# Patient Record
Sex: FEMALE | Race: BLACK OR AFRICAN AMERICAN | Employment: FULL TIME | URBAN - METROPOLITAN AREA
[De-identification: names, ages, dates, MRNs, and addresses within clinical notes are randomized per-mention and may not be internally consistent; named-entity substitution may affect disease eponyms.]

---

## 2017-08-30 ENCOUNTER — OFFICE VISIT (OUTPATIENT)
Dept: INTERNAL MEDICINE CLINIC | Facility: CLINIC | Age: 22
End: 2017-08-30

## 2017-08-30 VITALS
RESPIRATION RATE: 18 BRPM | TEMPERATURE: 98.1 F | HEART RATE: 79 BPM | WEIGHT: 229 LBS | HEIGHT: 68 IN | BODY MASS INDEX: 34.71 KG/M2 | DIASTOLIC BLOOD PRESSURE: 82 MMHG | SYSTOLIC BLOOD PRESSURE: 122 MMHG

## 2017-08-30 DIAGNOSIS — E01.0 THYROMEGALY: ICD-10-CM

## 2017-08-30 DIAGNOSIS — Q60.3 HYPOPLASIA OF ONE KIDNEY: ICD-10-CM

## 2017-08-30 DIAGNOSIS — R53.82 CHRONIC FATIGUE: ICD-10-CM

## 2017-08-30 DIAGNOSIS — Z97.5 IUD (INTRAUTERINE DEVICE) IN PLACE: ICD-10-CM

## 2017-08-30 DIAGNOSIS — G89.29 CHRONIC RIGHT EAR PAIN: ICD-10-CM

## 2017-08-30 DIAGNOSIS — R63.5 WEIGHT GAIN: ICD-10-CM

## 2017-08-30 DIAGNOSIS — R03.0 ELEVATED BLOOD PRESSURE READING: ICD-10-CM

## 2017-08-30 DIAGNOSIS — R06.83 SNORING: ICD-10-CM

## 2017-08-30 DIAGNOSIS — H92.01 CHRONIC RIGHT EAR PAIN: ICD-10-CM

## 2017-08-30 DIAGNOSIS — Z00.00 PHYSICAL EXAM: Primary | ICD-10-CM

## 2017-08-30 DIAGNOSIS — E88.81 INSULIN RESISTANCE: ICD-10-CM

## 2017-08-30 DIAGNOSIS — L73.2 HIDRADENITIS: ICD-10-CM

## 2017-08-30 DIAGNOSIS — E66.9 OBESITY, UNSPECIFIED OBESITY SEVERITY, UNSPECIFIED OBESITY TYPE: ICD-10-CM

## 2017-08-30 LAB
BILIRUB UR QL STRIP: NEGATIVE
GLUCOSE UR-MCNC: NEGATIVE MG/DL
HBA1C MFR BLD HPLC: 5.3 %
KETONES P FAST UR STRIP-MCNC: NEGATIVE MG/DL
PH UR STRIP: 6.5 [PH] (ref 4.6–8)
PROT UR QL STRIP: NEGATIVE MG/DL
SP GR UR STRIP: 1.02 (ref 1–1.03)
UA UROBILINOGEN AMB POC: NORMAL (ref 0.2–1)
URINALYSIS CLARITY POC: CLEAR
URINALYSIS COLOR POC: YELLOW
URINE BLOOD POC: NEGATIVE
URINE LEUKOCYTES POC: NORMAL
URINE NITRITES POC: NEGATIVE

## 2017-08-30 RX ORDER — LISINOPRIL AND HYDROCHLOROTHIAZIDE 10; 12.5 MG/1; MG/1
TABLET ORAL DAILY
COMMUNITY
End: 2018-10-09 | Stop reason: SDUPTHER

## 2017-08-30 NOTE — PROGRESS NOTES
Chief Complaint   Patient presents with    Physical     1. Have you been to the ER, urgent care clinic since your last visit? Hospitalized since your last visit? No    2. Have you seen or consulted any other health care providers outside of the [address] [place] [address] since your last visit? Include any pap smears or colon screening.  No

## 2017-08-30 NOTE — LETTER
NOTIFICATION RETURN TO WORK / SCHOOL 
 
8/30/2017 1:40 PM 
 
Ms. Nadege Jamil [facility] [address] To Whom It May Concern: 
 
Nadege Jamil is currently under the care of Benny Cruz. She will return to work: 8/30/2017 If there are questions or concerns please have the patient contact our office. Sincerely, Joe Roper MD

## 2017-08-30 NOTE — MR AVS SNAPSHOT
Visit Information Date & Time Provider Department Dept. Phone Encounter #  
 8/30/2017 12:15 PM Yvonne Munoz, [facility] Internal Medicine [phone] [id number] Follow-up Instructions Return in about 2 weeks (around 9/13/2017) for follow up review results. Upcoming Health Maintenance Date Due Hepatitis A Peds Age 1-18 (1 of 2 - Standard Series) 1/26/1998 DTaP/Tdap/Td series (1 - Tdap) 1/26/2004 HPV AGE 9Y-26Y (1 of 3 - Female 3 Dose Series) 1/26/2008 INFLUENZA AGE 9 TO ADULT 8/1/2017 Allergies as of 8/30/2017  Review Complete On: 8/30/2017 By: Raine Alvarez LPN No Known Allergies Current Immunizations  Never Reviewed No immunizations on file. Not reviewed this visit You Were Diagnosed With   
  
 Codes Comments Physical exam    -  Primary ICD-10-CM: Z00.00 ICD-9-CM: V70.9 Hypoplasia of one kidney     ICD-10-CM: Q60.3 ICD-9-CM: 753.0 Insulin resistance     ICD-10-CM: V50.98 ICD-9-CM: 277.7 Elevated blood pressure reading     ICD-10-CM: R03.0 ICD-9-CM: 796.2 Hidradenitis     ICD-10-CM: L73.2 ICD-9-CM: 705.83   
 IUD (intrauterine device) in place     ICD-10-CM: Z97.5 ICD-9-CM: V45.51 Obesity, unspecified obesity severity, unspecified obesity type     ICD-10-CM: E66.9 ICD-9-CM: 278.00 Weight gain     ICD-10-CM: R63.5 ICD-9-CM: 783.1 Thyromegaly     ICD-10-CM: E01.0 ICD-9-CM: 240. 9 Chronic right ear pain     ICD-10-CM: H92.01, G89.29 ICD-9-CM: 388.70, 338.29 Snoring     ICD-10-CM: R06.83 
ICD-9-CM: 786.09 Chronic fatigue     ICD-10-CM: R53.82 
ICD-9-CM: 780.79 Vitals BP Pulse Temp Resp Height(growth percentile) Weight(growth percentile) 122/82 (BP 1 Location: Right arm, BP Patient Position: Sitting) 79 98.1 °F (36.7 °C) (Oral) 18 5' 7.5\" (1.715 m) 229 lb (103.9 kg) BMI OB Status Smoking Status 35.34 kg/m2 IUD Never Smoker BMI and BSA Data Body Mass Index Body Surface Area  
 35.34 kg/m 2 2.22 m 2 Preferred Pharmacy Pharmacy Name Phone [facility], [address] [place] [address] Your Updated Medication List  
  
   
This list is accurate as of: 8/30/17  1:32 PM.  Always use your most recent med list.  
  
  
  
  
 lisinopril-hydroCHLOROthiazide 10-12.5 mg per tablet Commonly known as:  Juhibello Tejada Take  by mouth daily. MIRENA 20 mcg/24 hr (5 years) IUD Generic drug:  levonorgestrel 1 Each by IntraUTERine route once. We Performed the Following AMB POC HEMOGLOBIN A1C [59685 CPT(R)] AMB POC URINALYSIS DIP STICK AUTO W/O MICRO [44391 CPT(R)] CBC WITH AUTOMATED DIFF [99605 CPT(R)] IRON [id number] CPT(R)] LIPID PANEL [86146 CPT(R)] METABOLIC PANEL, COMPREHENSIVE [28198 CPT(R)] REFERRAL TO ENT-OTOLARYNGOLOGY [OFE58 Custom] Comments:  
 Leartis Nyhan. AYUSH Mobley 
[address] [facility] [address] [place] [facility], [address] [place] [address] Phone: [phone] REFERRAL TO GYNECOLOGY [REF30 Custom] REFERRAL TO NEPHROLOGY [ENW25 Custom] REFERRAL TO OPHTHALMOLOGY [REF57 Custom] REFERRAL TO SLEEP STUDIES [REF99 Custom] T4, FREE [id number] CPT(R)] TSH 3RD GENERATION [86939 CPT(R)] VITAMIN B12 [id number] CPT(R)] VITAMIN D, 25 HYDROXY [id number] CPT(R)] Follow-up Instructions Return in about 2 weeks (around 9/13/2017) for follow up review results. To-Do List   
 09/30/2017 Imaging:  US THYROID/PARATHYROID/SOFT TISS Referral Information Referral ID Referred By Referred To  
  
 [id number] Yenifer Mederos MD   
   [address] [facility] [address] [facility], [address] Phone: [phone] Fax: [phone] Visits Status Start Date End Date 1 New Request 8/30/17 8/30/18  If your referral has a status of pending review or denied, additional information will be sent to support the outcome of this decision. Referral ID Referred By Referred To  
 [id number] Lorne Keenan MD  
   [address] [facility] [address] [facility], [address] Phone: [phone] Fax: [phone] Visits Status Start Date End Date 1 New Request 8/30/17 8/30/18 If your referral has a status of pending review or denied, additional information will be sent to support the outcome of this decision. Referral ID Referred By Referred To  
 [id number] Veryl Freiberg [facility] [address] [facility], [address] Visits Status Start Date End Date 1 New Request 8/30/17 8/30/18 If your referral has a status of pending review or denied, additional information will be sent to support the outcome of this decision. Referral ID Referred By Referred To  
 [id number] [facility]  
   [address] [facility] [address] Coleen oGnzalezanellcarlita Tobar, [address] [place] [address] Visits Status Start Date End Date 1 New Request 8/30/17 8/30/18 If your referral has a status of pending review or denied, additional information will be sent to support the outcome of this decision. Referral ID Referred By Referred To  
 [id number] Owen Jiménez MD  
   [facility] [place] [address] Phone: [phone] Fax: [phone] Visits Status Start Date End Date 1 New Request 8/30/17 8/30/18 If your referral has a status of pending review or denied, additional information will be sent to support the outcome of this decision. Introducing [facility]! Ariella Simpson introduces Beegit patient portal. Now you can access parts of your medical record, email your doctor's office, and request medication refills online. 1. In your internet browser, go to [email or web address] 2. Click on the First Time User? Click Here link in the Sign In box.  You will see the New Member Sign Up page. 3. Enter your Picomize Access Code exactly as it appears below. You will not need to use this code after youve completed the sign-up process. If you do not sign up before the expiration date, you must request a new code. · Picomize Access Code: [email or web address] Expires: 11/28/2017 12:05 PM 
 
4. Enter the last four digits of your Social Security Number (xxxx) and Date of Birth (mm/dd/yyyy) as indicated and click Submit. You will be taken to the next sign-up page. 5. Create a Picomize ID. This will be your Picomize login ID and cannot be changed, so think of one that is secure and easy to remember. 6. Create a Picomize password. You can change your password at any time. 7. Enter your Password Reset Question and Answer. This can be used at a later time if you forget your password. 8. Enter your e-mail address. You will receive e-mail notification when new information is available in [address] 9. Click Sign Up. You can now view and download portions of your medical record. 10. Click the Download Summary menu link to download a portable copy of your medical information. If you have questions, please visit the Frequently Asked Questions section of the Picomize website. Remember, Picomize is NOT to be used for urgent needs. For medical emergencies, dial 911. Now available from your iPhone and Android! Please provide this summary of care documentation to your next provider. If you have any questions after today's visit, please call [phone].

## 2017-08-30 NOTE — PROGRESS NOTES
Subjective:      Sameer Blandon is a 23 y.o. female who presents today for   Chief Complaint   Patient presents with    Physical     Patient in today for complete physical  Patient works for [employer], college student, 3year old daughter  Patient has a  (Hx of one kidney, elevated blood pressure- she was seeing a nephrologist before she moved), insulin resistance, hidradenitis suppuritiva    Refer ophthalmologist     GYN: IUD placed dec 2014  She would like to see obgyn- dr Marcela Shrestha  Has not had breast exam    Vaccines:  TDAP x 2 years ago  Advised annual flu vaccine    Diet:  Eats mostly frozen dinners, snacks between meals, drinks sodas and sweet tea. She does want to lose weight. She has gained 50 lbs over 6 month period. She has tried weight watchers  \    Advise to take daily MVI      Acute issues/complaints:  Patient very fatigued she snores heavily    Has chronic right ear pain      There are no active problems to display for this patient. Current Outpatient Prescriptions   Medication Sig Dispense Refill    levonorgestrel (MIRENA) 20 mcg/24 hr (5 years) IUD 1 Each by IntraUTERine route once.  lisinopril-hydroCHLOROthiazide (PRINZIDE, ZESTORETIC) 10-12.5 mg per tablet Take  by mouth daily. No Known Allergies  Past Medical History:   Diagnosis Date    Hidradenitis suppurativa      Past Surgical History:   Procedure Laterality Date    HX ADENOIDECTOMY      HX TONSILLECTOMY       Family History   Problem Relation Age of Onset    Hypertension Mother    Garlin  Lupus Mother     Diabetes Father     Diabetes Maternal Grandmother     Diabetes Paternal Grandmother      Social History   Substance Use Topics    Smoking status: Never Smoker    Smokeless tobacco: Never Used    Alcohol use No        Review of Systems    A comprehensive review of systems was negative except for that written in the HPI.      Objective:     Visit Vitals    /82 (BP 1 Location: Right arm, BP Patient Position: Sitting)    Pulse 79    Temp 98.1 °F (36.7 °C) (Oral)    Resp 18    Ht 5' 7.5\" (1.715 m)    Wt 229 lb (103.9 kg)    BMI 35.34 kg/m2     General:  Alert, cooperative, no distress, appears stated age. Head:  Normocephalic, without obvious abnormality, atraumatic. Eyes:  Conjunctivae/corneas clear. PERRL, EOMs intact. Fundi benign. Ears:  Normal TMs and external ear canals both ears. Small amt wax in canals bilaterally   Nose: Nares normal. Septum midline. Mucosa normal. No drainage or sinus tenderness. Throat: Lips, mucosa, and tongue normal. Teeth and gums normal.   Neck: Supple, symmetrical, trachea midline, no adenopathy, thyromegaly, no carotid bruit and no JVD. Back:   Symmetric, no curvature. ROM normal. No CVA tenderness. Lungs:   Clear to auscultation bilaterally. Chest wall:  No tenderness or deformity. Heart:  Regular rate and rhythm, S1, S2 normal, no murmur, click, rub or gallop. Abdomen:   Obese abdomen. Soft, non-tender. Bowel sounds normal. No masses,  No organomegaly. Extremities: Extremities normal, atraumatic, no cyanosis or edema. Pulses: 2+ and symmetric all extremities. Skin: Skin color, texture, turgor normal. No rashes or lesions. Lymph nodes: Cervical, supraclavicular, and axillary nodes normal.   Neurologic: CNII-XII intact. Normal strength, sensation and reflexes throughout. Assessment/Plan:       ICD-10-CM ICD-9-CM    1. Physical exam Z00.00 V70.9 REFERRAL TO OPHTHALMOLOGY      METABOLIC PANEL, COMPREHENSIVE      LIPID PANEL      CBC WITH AUTOMATED DIFF      AMB POC URINALYSIS DIP STICK AUTO W/O MICRO      AMB POC HEMOGLOBIN A1C   2. 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 Insulin resistance E88.81 277.7 AMB POC HEMOGLOBIN A1C   4. Elevated blood pressure reading R03.0 796.2    5. Hidradenitis L73.2 705.83    6.  IUD (intrauterine device) in place Z97.5 V45.51 REFERRAL TO GYNECOLOGY   7. Obesity, unspecified obesity severity, unspecified obesity type E66.9 278.00    8. Weight gain R63.5 783.1    9. Thyromegaly E01.0 240.9 US THYROID/PARATHYROID/SOFT TISS      T4, FREE      TSH 3RD GENERATION   10. Chronic right ear pain H92.01 388.70 REFERRAL TO ENT-OTOLARYNGOLOGY    G89.29 338.29    11. Snoring R06.83 786.09 REFERRAL TO SLEEP STUDIES   12. Chronic fatigue R53.82 780.79 REFERRAL TO SLEEP STUDIES      METABOLIC PANEL, COMPREHENSIVE      CBC WITH AUTOMATED DIFF      AMB POC URINALYSIS DIP STICK AUTO W/O MICRO      AMB POC HEMOGLOBIN A1C      VITAMIN D, 25 HYDROXY      VITAMIN B12      IRON      T4, FREE      TSH 3RD GENERATION       Follow-up Disposition: Not on File   Advised her to call back or return to office if symptoms worsen/change/persist.  Discussed expected course/resolution/complications of diagnosis in detail with patient. Medication risks/benefits/costs/interactions/alternatives discussed with patient. She was given an after visit summary which includes diagnoses, current medications, & vitals. She expressed understanding with the diagnosis and plan.

## 2017-08-31 LAB
25(OH)D3+25(OH)D2 SERPL-MCNC: 11.4 NG/ML (ref 30–100)
ALBUMIN SERPL-MCNC: 4.5 G/DL (ref 3.5–5.5)
ALBUMIN/GLOB SERPL: 1.4 {RATIO} (ref 1.2–2.2)
ALP SERPL-CCNC: 63 IU/L (ref 39–117)
ALT SERPL-CCNC: 16 IU/L (ref 0–32)
AST SERPL-CCNC: 18 IU/L (ref 0–40)
BASOPHILS # BLD AUTO: 0 X10E3/UL (ref 0–0.2)
BASOPHILS NFR BLD AUTO: 0 %
BILIRUB SERPL-MCNC: 0.4 MG/DL (ref 0–1.2)
BUN SERPL-MCNC: 9 MG/DL (ref 6–20)
BUN/CREAT SERPL: 12 (ref 9–23)
CALCIUM SERPL-MCNC: 9.4 MG/DL (ref 8.7–10.2)
CHLORIDE SERPL-SCNC: 100 MMOL/L (ref 96–106)
CHOLEST SERPL-MCNC: 248 MG/DL (ref 100–199)
CO2 SERPL-SCNC: 23 MMOL/L (ref 18–29)
CREAT SERPL-MCNC: 0.78 MG/DL (ref 0.57–1)
EOSINOPHIL # BLD AUTO: 0.2 X10E3/UL (ref 0–0.4)
EOSINOPHIL NFR BLD AUTO: 3 %
ERYTHROCYTE [DISTWIDTH] IN BLOOD BY AUTOMATED COUNT: 13.6 % (ref 12.3–15.4)
GLOBULIN SER CALC-MCNC: 3.2 G/DL (ref 1.5–4.5)
GLUCOSE SERPL-MCNC: 79 MG/DL (ref 65–99)
HCT VFR BLD AUTO: 39.7 % (ref 34–46.6)
HDLC SERPL-MCNC: 48 MG/DL
HGB BLD-MCNC: 13.4 G/DL (ref 11.1–15.9)
IMM GRANULOCYTES # BLD: 0 X10E3/UL (ref 0–0.1)
IMM GRANULOCYTES NFR BLD: 0 %
IRON SERPL-MCNC: 63 UG/DL (ref 27–159)
LDLC SERPL CALC-MCNC: 168 MG/DL (ref 0–99)
LYMPHOCYTES # BLD AUTO: 2.3 X10E3/UL (ref 0.7–3.1)
LYMPHOCYTES NFR BLD AUTO: 37 %
MCH RBC QN AUTO: 28 PG (ref 26.6–33)
MCHC RBC AUTO-ENTMCNC: 33.8 G/DL (ref 31.5–35.7)
MCV RBC AUTO: 83 FL (ref 79–97)
MONOCYTES # BLD AUTO: 0.5 X10E3/UL (ref 0.1–0.9)
MONOCYTES NFR BLD AUTO: 9 %
NEUTROPHILS # BLD AUTO: 3.2 X10E3/UL (ref 1.4–7)
NEUTROPHILS NFR BLD AUTO: 51 %
PLATELET # BLD AUTO: 243 X10E3/UL (ref 150–379)
POTASSIUM SERPL-SCNC: 4.3 MMOL/L (ref 3.5–5.2)
PROT SERPL-MCNC: 7.7 G/DL (ref 6–8.5)
RBC # BLD AUTO: 4.78 X10E6/UL (ref 3.77–5.28)
SODIUM SERPL-SCNC: 140 MMOL/L (ref 134–144)
T4 FREE SERPL-MCNC: 1.3 NG/DL (ref 0.82–1.77)
TRIGL SERPL-MCNC: 161 MG/DL (ref 0–149)
TSH SERPL DL<=0.005 MIU/L-ACNC: 1.5 UIU/ML (ref 0.45–4.5)
VIT B12 SERPL-MCNC: 348 PG/ML (ref 211–946)
VLDLC SERPL CALC-MCNC: 32 MG/DL (ref 5–40)
WBC # BLD AUTO: 6.2 X10E3/UL (ref 3.4–10.8)

## 2017-09-08 NOTE — PROGRESS NOTES
Called and spoke with pt explained per Dr. Jose John that her lab results were WNL except her cholesterol was very high and vitamin D was low. Pt has been directed to schedule a follow up to discuss these.  Yoav Hanson LPN

## 2017-09-08 NOTE — PROGRESS NOTES
Agueda Lieberman overall her labs look pretty good  No diabetes  Normal kidney and liver function  Normal blood counts  B12 , iron and thyroid are normal    Her cholesterol was very high!   Vitamin D was low  Ask her to set up a follow up visit

## 2017-09-14 ENCOUNTER — PATIENT MESSAGE (OUTPATIENT)
Dept: INTERNAL MEDICINE CLINIC | Facility: CLINIC | Age: 22
End: 2017-09-14

## 2017-09-18 RX ORDER — ASPIRIN 325 MG
50000 TABLET, DELAYED RELEASE (ENTERIC COATED) ORAL
Qty: 8 CAP | Refills: 0 | Status: SHIPPED | OUTPATIENT
Start: 2017-09-18 | End: 2018-10-09 | Stop reason: ALTCHOICE

## 2017-09-18 NOTE — TELEPHONE ENCOUNTER
RADHA JACKSON by Dr. Thomas Profit to send over a prescription for Vitamin D3 50,000units one tab every 7 days with no refills. Pt has been notified.

## 2018-07-05 RX ORDER — HYDROCORTISONE 25 MG/G
CREAM TOPICAL 2 TIMES DAILY
Qty: 30 G | Refills: 0 | Status: SHIPPED | OUTPATIENT
Start: 2018-07-05 | End: 2018-10-11 | Stop reason: SDUPTHER

## 2018-07-05 RX ORDER — LISINOPRIL AND HYDROCHLOROTHIAZIDE 10; 12.5 MG/1; MG/1
1 TABLET ORAL DAILY
Qty: 30 TAB | Refills: 3 | Status: SHIPPED | OUTPATIENT
Start: 2018-07-05 | End: 2018-10-11 | Stop reason: SDUPTHER

## 2018-07-05 NOTE — TELEPHONE ENCOUNTER
From: Mckenzie Fuller  To:  Levy Thibodeaux MD  Sent: 7/4/2018 8:38 PM EDT  Subject: Medication Renewal Request    Original authorizing provider: MD Danay Alvesbret Hendersonjosafat would like a refill of the following medications:  lisinopril-hydroCHLOROthiazide (PRINZIDE, ZESTORETIC) 10-12.5 mg per tablet Levy Thibodeaux MD]  hydrocortisone (HYTONE) 2.5 % topical cream Levy Thibodeaux MD]    Preferred pharmacy: [facility] [address]:

## 2018-10-09 ENCOUNTER — OFFICE VISIT (OUTPATIENT)
Dept: INTERNAL MEDICINE CLINIC | Facility: CLINIC | Age: 23
End: 2018-10-09

## 2018-10-09 VITALS
SYSTOLIC BLOOD PRESSURE: 119 MMHG | RESPIRATION RATE: 18 BRPM | HEIGHT: 67 IN | TEMPERATURE: 98.3 F | WEIGHT: 238 LBS | DIASTOLIC BLOOD PRESSURE: 86 MMHG | HEART RATE: 100 BPM | BODY MASS INDEX: 37.35 KG/M2 | OXYGEN SATURATION: 98 %

## 2018-10-09 DIAGNOSIS — R45.86 MOOD SWINGS: ICD-10-CM

## 2018-10-09 DIAGNOSIS — R19.7 DIARRHEA, UNSPECIFIED TYPE: ICD-10-CM

## 2018-10-09 DIAGNOSIS — R10.9 ABDOMINAL CRAMPING: Primary | ICD-10-CM

## 2018-10-09 DIAGNOSIS — Z97.5 IUD CONTRACEPTION: ICD-10-CM

## 2018-10-09 DIAGNOSIS — G43.909 MIGRAINE WITHOUT STATUS MIGRAINOSUS, NOT INTRACTABLE, UNSPECIFIED MIGRAINE TYPE: ICD-10-CM

## 2018-10-09 DIAGNOSIS — L68.0 HIRSUTISM: ICD-10-CM

## 2018-10-09 DIAGNOSIS — E66.01 SEVERE OBESITY (BMI 35.0-35.9 WITH COMORBIDITY) (HCC): ICD-10-CM

## 2018-10-09 LAB
BILIRUB UR QL STRIP: NEGATIVE
GLUCOSE UR-MCNC: NEGATIVE MG/DL
KETONES P FAST UR STRIP-MCNC: NEGATIVE MG/DL
PH UR STRIP: 7 [PH] (ref 4.6–8)
PROT UR QL STRIP: NEGATIVE
SP GR UR STRIP: 1.02 (ref 1–1.03)
UA UROBILINOGEN AMB POC: NORMAL (ref 0.2–1)
URINALYSIS CLARITY POC: CLEAR
URINALYSIS COLOR POC: YELLOW
URINE BLOOD POC: NEGATIVE
URINE LEUKOCYTES POC: NEGATIVE
URINE NITRITES POC: NEGATIVE

## 2018-10-09 NOTE — PATIENT INSTRUCTIONS
Polycystic Ovary Syndrome: Care Instructions  Your Care Instructions  Polycystic ovary syndrome, or PCOS, means a woman's hormones are out of balance. It can cause problems with your periods and make it hard to get pregnant. Doctors don't know for sure what causes PCOS, but it seems to run in families. It also seems to be linked to obesity and a risk for diabetes. If you have PCOS, your sisters and daughters have a higher chance of getting it too. You may have other symptoms. These include weight gain, acne, too much hair growth on the face or body, high blood pressure, and high blood sugar. Your ovaries may have cysts on them. These cysts are growths filled with fluid. Keep in mind that although you may not have regular periods, you can still get pregnant. Talk to your doctor about birth control if you do not want to get pregnant. Sometimes the hormone changes with PCOS can also make it hard for some women to get pregnant. If this is a concern, talk to your doctor about treatment for this problem. Women who have PCOS can go for months or longer with no period. Your doctor may recommend medicines that can help get your cycles back to normal.  Follow-up care is a key part of your treatment and safety. Be sure to make and go to all appointments, and call your doctor if you are having problems. It's also a good idea to know your test results and keep a list of the medicines you take. How can you care for yourself at home? · Take your medicines exactly as prescribed. Call your doctor if you think you are having a problem with your medicine. · Eat a healthy diet. Include fruits, vegetables, beans, and whole grains in your diet each day. · If you are overweight, losing weight can help with many of the symptoms of PCOS. Talk to your doctor about safe ways to lose weight. · Get at least 30 minutes of exercise on most days of the week. Walking is a good choice.  Or you can run, swim, cycle, or play tennis or team sports. · For hair growth you don't want, try bleaching, plucking, electrolysis, or laser therapy. · Acne can be treated with over-the-counter medicines. Look for ones that have benzoyl peroxide or salicylic acid in them. When should you call for help? Call your doctor now or seek immediate medical care if:    · You have severe vaginal bleeding.     · You have new or worse belly or pelvic pain.    Watch closely for changes in your health, and be sure to contact your doctor if:    · You do not get better as expected.     · You have unusual vaginal bleeding. Where can you learn more? Go to http://vaughn-shemar.info/. Enter E844 in the search box to learn more about \"Polycystic Ovary Syndrome: Care Instructions. \"  Current as of: May 15, 2018  Content Version: 11.8  © 4024-6982 [employer]. Care instructions adapted under license by [employer] (which disclaims liability or warranty for this information). If you have questions about a medical condition or this instruction, always ask your healthcare professional. [place] [address] any warranty or liability for your use of this information. Starting a Weight Loss Plan: Care Instructions  Your Care Instructions    If you are thinking about losing weight, it can be hard to know where to start. Your doctor can help you set up a weight loss plan that best meets your needs. You may want to take a class on nutrition or exercise, or join a weight loss support group. If you have questions about how to make changes to your eating or exercise habits, ask your doctor about seeing a registered dietitian or an exercise specialist.  It can be a big challenge to lose weight. But you do not have to make huge changes at once. Make small changes, and stick with them. When those changes become habit, add a few more changes.   If you do not think you are ready to make changes right now, try to pick a date in the future. Make an appointment to see your doctor to discuss whether the time is right for you to start a plan. Follow-up care is a key part of your treatment and safety. Be sure to make and go to all appointments, and call your doctor if you are having problems. It's also a good idea to know your test results and keep a list of the medicines you take. How can you care for yourself at home? · Set realistic goals. Many people expect to lose much more weight than is likely. A weight loss of 5% to 10% of your body weight may be enough to improve your health. · Get family and friends involved to provide support. Talk to them about why you are trying to lose weight, and ask them to help. They can help by participating in exercise and having meals with you, even if they may be eating something different. · Find what works best for you. If you do not have time or do not like to cook, a program that offers meal replacement bars or shakes may be better for you. Or if you like to prepare meals, finding a plan that includes daily menus and recipes may be best.  · Ask your doctor about other health professionals who can help you achieve your weight loss goals. ¨ A dietitian can help you make healthy changes in your diet. ¨ An exercise specialist or  can help you develop a safe and effective exercise program.  ¨ A counselor or psychiatrist can help you cope with issues such as depression, anxiety, or family problems that can make it hard to focus on weight loss. · Consider joining a support group for people who are trying to lose weight. Your doctor can suggest groups in your area. Where can you learn more? Go to http://vaughn-shemar.info/. Enter R223 in the search box to learn more about \"Starting a Weight Loss Plan: Care Instructions. \"  Current as of: June 26, 2018  Content Version: 11.8  © 4076-3380 [employer].  Care instructions adapted under license by [employer] (which disclaims liability or warranty for this information). If you have questions about a medical condition or this instruction, always ask your healthcare professional. [address] any warranty or liability for your use of this information.

## 2018-10-09 NOTE — PROGRESS NOTES
Subjective:      Queen Skiff is a 26 y.o. female who presents today for follow up. This is one of Dr. Theresa Gallegos patients but she has not been seen in over a year. Abdominal cramping: Cramping comes in the morning or at night and started 2 weeks ago. She states symptoms are getting worse. She also notes this headaches, dizziness, emotional outbreaks, moodiness. She will get diarrhea with episodes daily and run to the bathroom. She denies depression, anxiety, emotional stress, congestion, sneezing, cold symptoms. She has taken a pregnancy test and it is negative. She has an IUD since 2014. She also denies constipation. She denies travel out of the country. Patient Active Problem List    Diagnosis Date Noted    Severe obesity ([facility] [address] 10/09/2018     Current Outpatient Prescriptions   Medication Sig Dispense Refill    lisinopril-hydroCHLOROthiazide (PRINZIDE, ZESTORETIC) 10-12.5 mg per tablet Take 1 Tab by mouth daily. 30 Tab 3    hydrocortisone (HYTONE) 2.5 % topical cream Apply  to affected area two (2) times a day. use thin layer 30 g 0    levonorgestrel (MIRENA) 20 mcg/24 hr (5 years) IUD 1 Each by IntraUTERine route once.  Cholecalciferol, Vitamin D3, 50,000 unit cap Take 1 Cap by mouth every seven (7) days. Indications: VITAMIN D DEFICIENCY 8 Cap 0    lisinopril-hydroCHLOROthiazide (PRINZIDE, ZESTORETIC) 10-12.5 mg per tablet Take  by mouth daily. No Known Allergies  Past Medical History:   Diagnosis Date    Hidradenitis suppurativa      Past Surgical History:   Procedure Laterality Date    HX ADENOIDECTOMY      HX TONSILLECTOMY          Review of Systems    A comprehensive review of systems was negative except for that written in the HPI.      Objective:     Visit Vitals    /86 (BP 1 Location: Right arm, BP Patient Position: Sitting)    Pulse 100    Temp 98.3 °F (36.8 °C) (Oral)    Resp 18    Ht 5' 7\" (1.702 m)    Wt 238 lb (108 kg)    LMP 09/27/2018    SpO2 98%    BMI 37.28 kg/m2     General appearance: alert, cooperative, no distress, appears stated age  Head: Normocephalic, without obvious abnormality, atraumatic  Eyes: conjunctivae/corneas clear. PERRL, EOM's intact. Fundi benign  Ears: normal TM's and external ear canals AU  Nose: Nares normal. Septum midline. Mucosa normal. No drainage or sinus tenderness. Throat: Lips, mucosa, and tongue normal. Teeth and gums normal  Neck: supple, symmetrical, trachea midline, no adenopathy, thyroid: not enlarged, symmetric, no tenderness/mass/nodules, no carotid bruit and no JVD  Back: symmetric, no curvature. ROM normal. No CVA tenderness. Lungs: clear to auscultation bilaterally  Heart: regular rate and rhythm, S1, S2 normal, no murmur, click, rub or gallop  Abdomen: soft, tender to LLQ. Bowel sounds normal. No masses,  no organomegaly  Extremities: extremities normal, atraumatic, no cyanosis or edema  Pulses: 2+ and symmetric  Skin: Skin color, texture, turgor normal. No rashes or lesions. Neurologic: Alert and oriented X 3, normal strength and tone. Normal coordination and gait  Psych: appropriate mood, speech, affect  Nursing note and vitals reviewed  Assessment/Plan:       ICD-10-CM ICD-9-CM    1. Abdominal cramping S89.2 390.01 METABOLIC PANEL, COMPREHENSIVE      CBC WITH AUTOMATED DIFF      AMB POC URINALYSIS DIP STICK AUTO W/O MICRO   2. Diarrhea, unspecified type R19.7 787.91 CULTURE, STOOL      OVA & PARASITES, STOOL      METABOLIC PANEL, COMPREHENSIVE      CBC WITH AUTOMATED DIFF   3. IUD contraception Z97.5 V45.51 REFERRAL TO OBSTETRICS AND GYNECOLOGY   4. Mood swings R45.86 296.99    5. Migraine without status migrainosus, not intractable, unspecified migraine type G43.909 346.90    6. Severe obesity (BMI 35.0-35.9 with comorbidity) ([facility]) E66.01 278.01     Z68.35 V85.35    7.  Hirsutism L68.0 704.1 REFERRAL TO OBSTETRICS AND GYNECOLOGY       Follow-up Disposition:  Return if symptoms worsen or fail to improve, for We will be in touch when labs are back. Advised her to call back or return to office if symptoms worsen/change/persist.  Discussed expected course/resolution/complications of diagnosis in detail with patient. Medication risks/benefits/costs/interactions/alternatives discussed with patient. She was given an after visit summary which includes diagnoses, current medications, & vitals. She expressed understanding with the diagnosis and plan.

## 2018-10-09 NOTE — PROGRESS NOTES
Room 4    Chief Complaint   Patient presents with    Abdominal Cramping     headaches, dizziness, emotional outbreaks, moodiness     1. Have you been to the ER, urgent care clinic since your last visit? Hospitalized since your last visit? No    2. Have you seen or consulted any other health care providers outside of the [address] [place] [address] since your last visit? Include any pap smears or colon screening.  No

## 2018-10-11 LAB
ALBUMIN SERPL-MCNC: 4.1 G/DL (ref 3.5–5.5)
ALBUMIN/GLOB SERPL: 1.5 {RATIO} (ref 1.2–2.2)
ALP SERPL-CCNC: 56 IU/L (ref 39–117)
ALT SERPL-CCNC: 21 IU/L (ref 0–32)
AST SERPL-CCNC: 19 IU/L (ref 0–40)
BASOPHILS # BLD AUTO: 0 X10E3/UL (ref 0–0.2)
BASOPHILS NFR BLD AUTO: 1 %
BILIRUB SERPL-MCNC: 0.2 MG/DL (ref 0–1.2)
BUN SERPL-MCNC: 8 MG/DL (ref 6–20)
BUN/CREAT SERPL: 10 (ref 9–23)
CALCIUM SERPL-MCNC: 8.9 MG/DL (ref 8.7–10.2)
CHLORIDE SERPL-SCNC: 103 MMOL/L (ref 96–106)
CO2 SERPL-SCNC: 24 MMOL/L (ref 20–29)
CREAT SERPL-MCNC: 0.79 MG/DL (ref 0.57–1)
EOSINOPHIL # BLD AUTO: 0.3 X10E3/UL (ref 0–0.4)
EOSINOPHIL NFR BLD AUTO: 4 %
ERYTHROCYTE [DISTWIDTH] IN BLOOD BY AUTOMATED COUNT: 13.6 % (ref 12.3–15.4)
GLOBULIN SER CALC-MCNC: 2.8 G/DL (ref 1.5–4.5)
GLUCOSE SERPL-MCNC: 99 MG/DL (ref 65–99)
HCT VFR BLD AUTO: 39 % (ref 34–46.6)
HGB BLD-MCNC: 12.9 G/DL (ref 11.1–15.9)
IMM GRANULOCYTES # BLD: 0 X10E3/UL (ref 0–0.1)
IMM GRANULOCYTES NFR BLD: 0 %
LYMPHOCYTES # BLD AUTO: 2.7 X10E3/UL (ref 0.7–3.1)
LYMPHOCYTES NFR BLD AUTO: 41 %
MCH RBC QN AUTO: 27.7 PG (ref 26.6–33)
MCHC RBC AUTO-ENTMCNC: 33.1 G/DL (ref 31.5–35.7)
MCV RBC AUTO: 84 FL (ref 79–97)
MONOCYTES # BLD AUTO: 0.5 X10E3/UL (ref 0.1–0.9)
MONOCYTES NFR BLD AUTO: 8 %
NEUTROPHILS # BLD AUTO: 3 X10E3/UL (ref 1.4–7)
NEUTROPHILS NFR BLD AUTO: 46 %
PLATELET # BLD AUTO: 236 X10E3/UL (ref 150–379)
POTASSIUM SERPL-SCNC: 4.1 MMOL/L (ref 3.5–5.2)
PROT SERPL-MCNC: 6.9 G/DL (ref 6–8.5)
RBC # BLD AUTO: 4.66 X10E6/UL (ref 3.77–5.28)
SODIUM SERPL-SCNC: 139 MMOL/L (ref 134–144)
WBC # BLD AUTO: 6.4 X10E3/UL (ref 3.4–10.8)

## 2018-10-11 NOTE — TELEPHONE ENCOUNTER
From: Mckenzie Fuller  To:  Kristopher García MD  Sent: 10/11/2018 12:53 PM EDT  Subject: Medication Renewal Request    Original authorizing provider: Kristopher García MD    Lacy Goddard would like a refill of the following medications:  lisinopril-hydroCHLOROthiazide (PRINZIDE, ZESTORETIC) 10-12.5 mg per tablet Kristopher García MD]  hydrocortisone (HYTONE) 2.5 % topical cream Kristopher García MD]    Preferred pharmacy: [address]:

## 2018-10-12 RX ORDER — LISINOPRIL AND HYDROCHLOROTHIAZIDE 10; 12.5 MG/1; MG/1
1 TABLET ORAL DAILY
Qty: 30 TAB | Refills: 3 | Status: SHIPPED | OUTPATIENT
Start: 2018-10-12 | End: 2019-03-20 | Stop reason: SDUPTHER

## 2018-10-12 RX ORDER — HYDROCORTISONE 25 MG/G
CREAM TOPICAL 2 TIMES DAILY
Qty: 30 G | Refills: 0 | Status: SHIPPED | OUTPATIENT
Start: 2018-10-12 | End: 2019-03-18 | Stop reason: SDUPTHER

## 2018-10-17 ENCOUNTER — OFFICE VISIT (OUTPATIENT)
Dept: OBGYN CLINIC | Age: 23
End: 2018-10-17

## 2018-10-17 VITALS
HEIGHT: 67 IN | DIASTOLIC BLOOD PRESSURE: 86 MMHG | WEIGHT: 235.8 LBS | SYSTOLIC BLOOD PRESSURE: 136 MMHG | BODY MASS INDEX: 37.01 KG/M2

## 2018-10-17 DIAGNOSIS — R63.5 WEIGHT GAIN: Primary | ICD-10-CM

## 2018-10-17 DIAGNOSIS — E28.2 PCOS (POLYCYSTIC OVARIAN SYNDROME): ICD-10-CM

## 2018-10-17 DIAGNOSIS — T83.32XD MALPOSITIONED IUD, SUBSEQUENT ENCOUNTER: ICD-10-CM

## 2018-10-17 RX ORDER — ACETAMINOPHEN AND CODEINE PHOSPHATE 120; 12 MG/5ML; MG/5ML
1 SOLUTION ORAL DAILY
Qty: 30 TAB | Refills: 2 | Status: SHIPPED | OUTPATIENT
Start: 2018-10-17 | End: 2019-03-15 | Stop reason: SDUPTHER

## 2018-10-17 NOTE — PROGRESS NOTES
-----------------------------------IUD REPLACEMENT---------------------  Indications for Removal:  Johnathon Hicks is a ,  26 y.o. female [address] whose Patient's last menstrual period was 2018. was on 2018. who presents today for IUD replacement. Her current IUD was placed in 2014. She had a normal course with IUD until last year when bleeding and pelvic pain presented. The IUD removal procedure was discussed with the patient and she had no further questions. Procedure: The patient was placed in a dorsal lithotomy position and appropriately draped. On bimanual exam the uterus was anterior and normal in size with no tenderness present. A speculum exam was performed and the cervix was visualized. The cervix was prepped with zephiran solution. The IUD string was visualized. Using ring forceps , the string was grasped and the IUD removed intact. The IUD was shown to the patient.

## 2018-10-17 NOTE — PROGRESS NOTES
Chief Complaint   Pelvic Pain; Weight Gain; and Hirsutism      HPI  Marilin Canela is a 26 y.o. female who presents for the evaluation of pelvic pain. Patient's last menstrual period was 09/27/2018. Emotional outbursts, foggy brain, excessive hair growth, feeling hot, and weight gain-- has gained 60 lbs since IUD insertion  pcp told her may have PCOS-- advised to go to obgyn for evaluation  From [place], been in [place] since 2017  Had Mirena placed 12/1/2014-- irregular cycles    Ultrasound performed today showed:  THE UTERUS IS ANTEVERTED, NORMAL IN SIZE AND ECHOGENICITY. THE ENDOMETRIUM MEASURES 8MM IN THICKNESS. THE IUD APPEARS TO BE PLACED IN THE CERVICAL  PORTION OF THE ENDOMETRIAL LINING. CLINICAL COORELATION RECOMMENDED. RIGHT OVARY APPEARS TO HAVE A SIMPLE CYST MEASURING 28 X 21 X 22 MM, THERE ALSO APPEARS TO BE  MULTIPLE FOLLICLES SEEN ON THE PERIPHERY OF THE OVARY . LEFT OVARY APPEARS TO HAVE MULTIPLE FOLLICLES SEEN ON THE PERIPHERY OF THE OVARY.   NO FREE FLUID IS SEEN IN THE CDS    PHQ over the last two weeks 10/17/2018   Little interest or pleasure in doing things Several days   Feeling down, depressed, irritable, or hopeless Several days   Total Score PHQ 2 2       Past Medical History:   Diagnosis Date    Congenital absence of one kidney     Hidradenitis suppurativa     Hypertension     Obesity      Past Surgical History:   Procedure Laterality Date    HX ADENOIDECTOMY      HX TONSILLECTOMY       Social History     Occupational History    Not on file   Tobacco Use    Smoking status: Never Smoker    Smokeless tobacco: Never Used   Substance and Sexual Activity    Alcohol use: No    Drug use: No    Sexual activity: Yes     Partners: Male     Birth control/protection: IUD     Family History   Problem Relation Age of Onset    Hypertension Mother     Lupus Mother     Diabetes Father     Diabetes Maternal Grandmother     Hypertension Maternal Grandmother     Diabetes Paternal Grandmother No Known Allergies  Prior to Admission medications    Medication Sig Start Date End Date Taking? Authorizing Provider   lisinopril-hydroCHLOROthiazide (PRINZIDE, ZESTORETIC) 10-12.5 mg per tablet Take 1 Tab by mouth daily. 10/12/18  Yes Sepideh Duque MD   hydrocortisone (HYTONE) 2.5 % topical cream Apply  to affected area two (2) times a day. use thin layer 10/12/18  Yes Sepideh Duque MD   levonorgestrel (MIRENA) 20 mcg/24 hr (5 years) IUD 1 Each by IntraUTERine route once.    Yes Provider, Historical        Review of Systems: History obtained from the patient  Constitutional: positive for weight gain, feeling hot frequently  Breast: negative for breast lumps, nipple discharge, galactorrhea  GI: negative for change in bowel habits, abdominal pain, black or bloody stools  : Positive for pelvic pain, negative for frequency, dysuria, hematuria, vaginal discharge  MSK: negative for back pain, joint pain, muscle pain  Skin: Positive for male pattern hair growth, negative for itching, rash, hives  Psych: Positive for depression, foggy thoughts, emotional outbursts, Denies thoughts of harming herself      Objective:  Visit Vitals  /86   Ht 5' 7\" (1.702 m)   Wt 235 lb 12.8 oz (107 kg)   LMP 09/27/2018 Comment: irregular   BMI 36.93 kg/m²       Physical Exam:   PHYSICAL EXAMINATION    Constitutional  · Appearance: well-nourished, well developed, alert, in no acute distress    Genitourinary  · External Genitalia: normal appearance for age, no discharge present, no tenderness present, no inflammatory lesions present, no masses present, no atrophy present  · Vagina: normal vaginal vault without central or paravaginal defects, no discharge present, no inflammatory lesions present, no masses present  · Bladder: non-tender to palpation  · Urethra: appears normal  · Cervix: normal, IUD strings present  · Uterus: normal size, shape and consistency  · Perineum: perineum within normal limits, no evidence of trauma, no rashes or skin lesions present    Skin  · General Inspection: no rash, no lesions identified, Hirsutism noted    Neurologic/Psychiatric  · Mental Status:  · Orientation: grossly oriented to person, place and time  · Mood and Affect: teary when discussing appearance, affect appropriate    Assessment:   IUD malposition  Weight gain  Mood changes  Hirsutism  Decreased Libido    Plan:   IUD removal  Micronor for 3months  TSH with Reflex to T4  Education on Diet, exercise, safety of contraceptive choices, PCOS  RTO in three months for [facility]  Instructions given to pt.       Total visit >25 minutes with greater than 50% face to face interaction

## 2018-10-17 NOTE — PATIENT INSTRUCTIONS
Polycystic Ovary Syndrome: Care Instructions  Your Care Instructions  Polycystic ovary syndrome, or PCOS, means a woman's hormones are out of balance. It can cause problems with your periods and make it hard to get pregnant. Doctors don't know for sure what causes PCOS, but it seems to run in families. It also seems to be linked to obesity and a risk for diabetes. If you have PCOS, your sisters and daughters have a higher chance of getting it too. You may have other symptoms. These include weight gain, acne, too much hair growth on the face or body, high blood pressure, and high blood sugar. Your ovaries may have cysts on them. These cysts are growths filled with fluid. Keep in mind that although you may not have regular periods, you can still get pregnant. Talk to your doctor about birth control if you do not want to get pregnant. Sometimes the hormone changes with PCOS can also make it hard for some women to get pregnant. If this is a concern, talk to your doctor about treatment for this problem. Women who have PCOS can go for months or longer with no period. Your doctor may recommend medicines that can help get your cycles back to normal.  Follow-up care is a key part of your treatment and safety. Be sure to make and go to all appointments, and call your doctor if you are having problems. It's also a good idea to know your test results and keep a list of the medicines you take. How can you care for yourself at home? · Take your medicines exactly as prescribed. Call your doctor if you think you are having a problem with your medicine. · Eat a healthy diet. Include fruits, vegetables, beans, and whole grains in your diet each day. · If you are overweight, losing weight can help with many of the symptoms of PCOS. Talk to your doctor about safe ways to lose weight. · Get at least 30 minutes of exercise on most days of the week. Walking is a good choice.  Or you can run, swim, cycle, or play tennis or team sports. · For hair growth you don't want, try bleaching, plucking, electrolysis, or laser therapy. · Acne can be treated with over-the-counter medicines. Look for ones that have benzoyl peroxide or salicylic acid in them. When should you call for help? Call your doctor now or seek immediate medical care if:    · You have severe vaginal bleeding.     · You have new or worse belly or pelvic pain.    Watch closely for changes in your health, and be sure to contact your doctor if:    · You do not get better as expected.     · You have unusual vaginal bleeding. Where can you learn more? Go to http://vaughn-shemar.info/. Enter C435 in the search box to learn more about \"Polycystic Ovary Syndrome: Care Instructions. \"  Current as of: May 15, 2018  Content Version: 11.8  © 0677-8787 Healthwise, Incorporated. Care instructions adapted under license by [employer] (which disclaims liability or warranty for this information). If you have questions about a medical condition or this instruction, always ask your healthcare professional. [address] any warranty or liability for your use of this information.

## 2018-10-18 LAB — TSH SERPL DL<=0.005 MIU/L-ACNC: 1.39 UIU/ML (ref 0.45–4.5)

## 2018-10-29 ENCOUNTER — APPOINTMENT (OUTPATIENT)
Dept: GENERAL RADIOLOGY | Age: 23
End: 2018-10-29
Attending: EMERGENCY MEDICINE
Payer: COMMERCIAL

## 2018-10-29 ENCOUNTER — OFFICE VISIT (OUTPATIENT)
Dept: INTERNAL MEDICINE CLINIC | Facility: CLINIC | Age: 23
End: 2018-10-29

## 2018-10-29 ENCOUNTER — HOSPITAL ENCOUNTER (EMERGENCY)
Age: 23
Discharge: HOME | End: 2018-10-30
Attending: EMERGENCY MEDICINE | Admitting: EMERGENCY MEDICINE
Payer: COMMERCIAL

## 2018-10-29 VITALS
HEART RATE: 106 BPM | HEIGHT: 67 IN | OXYGEN SATURATION: 100 % | RESPIRATION RATE: 16 BRPM | TEMPERATURE: 98.5 F | SYSTOLIC BLOOD PRESSURE: 126 MMHG | DIASTOLIC BLOOD PRESSURE: 86 MMHG | BODY MASS INDEX: 36.24 KG/M2 | WEIGHT: 230.9 LBS

## 2018-10-29 DIAGNOSIS — K62.5 RECTAL BLEEDING: ICD-10-CM

## 2018-10-29 DIAGNOSIS — K59.00 CONSTIPATION, UNSPECIFIED CONSTIPATION TYPE: ICD-10-CM

## 2018-10-29 DIAGNOSIS — E28.2 PCOS (POLYCYSTIC OVARIAN SYNDROME): Primary | ICD-10-CM

## 2018-10-29 DIAGNOSIS — K59.00 CONSTIPATION, UNSPECIFIED CONSTIPATION TYPE: Primary | ICD-10-CM

## 2018-10-29 PROCEDURE — 99283 EMERGENCY DEPT VISIT LOW MDM: CPT

## 2018-10-29 PROCEDURE — 74018 RADEX ABDOMEN 1 VIEW: CPT

## 2018-10-29 RX ORDER — METFORMIN HYDROCHLORIDE 500 MG/1
500 TABLET ORAL
Qty: 30 TAB | Refills: 3 | Status: SHIPPED | OUTPATIENT
Start: 2018-10-29 | End: 2018-12-03

## 2018-10-29 RX ORDER — HYDROCORTISONE ACETATE 25 MG/1
25 SUPPOSITORY RECTAL EVERY 12 HOURS
Qty: 30 SUPPOSITORY | Refills: 0 | Status: SHIPPED | OUTPATIENT
Start: 2018-10-29 | End: 2019-03-29

## 2018-10-29 NOTE — PROGRESS NOTES
Subjective:      Harman Doty is a 26 y.o. female who presents today for   Chief Complaint   Patient presents with    Physical     Recently dx with PCOS-she saw a midwife   She was told she could not take metformin due to the fact that she has one kidney    IUD recently taken out and has been on the pill now for a week. She is taking Arielle    She started having constipation on 10/20. She was having severe right sided abd pain. She was having pain with bowel movements. Noticed no bm so took miralax. A day later she had a bm which was soft. Continued to take miralax. Friday noticed bright red blood in stool. On Sunday [facility].  Hemoccult positive, told had fecal impaction. Told to go home take laxative, stool softener, fleets enema. She has now used enema more than once but only liquid came out. Appetite has not been good. Patient Active Problem List    Diagnosis Date Noted    Severe obesity ([address] 10/09/2018    Migraine without status migrainosus, not intractable 10/09/2018    Hirsutism 10/09/2018     Current Outpatient Medications   Medication Sig Dispense Refill    norethindrone (ARIELLE) 0.35 mg tab Take 1 Tab by mouth daily. 30 Tab 2    lisinopril-hydroCHLOROthiazide (PRINZIDE, ZESTORETIC) 10-12.5 mg per tablet Take 1 Tab by mouth daily. 30 Tab 3    hydrocortisone (HYTONE) 2.5 % topical cream Apply  to affected area two (2) times a day.  use thin layer 30 g 0     No Known Allergies  Past Medical History:   Diagnosis Date    Congenital absence of one kidney     Hidradenitis suppurativa     Hypertension     Obesity      Past Surgical History:   Procedure Laterality Date    HX ADENOIDECTOMY      HX TONSILLECTOMY       Family History   Problem Relation Age of Onset    Hypertension Mother    [address] [facility] [address] Lupus Mother     Diabetes Father     Diabetes Maternal Grandmother     Hypertension Maternal Grandmother     Diabetes Paternal Grandmother      Social History     Tobacco Use    Smoking status: Never Smoker    Smokeless tobacco: Never Used   Substance Use Topics    Alcohol use: No        Review of Systems    A comprehensive review of systems was negative except for that written in the HPI. Objective:     Visit Vitals  /86   Pulse (!) 106   Temp 98.5 °F (36.9 °C) (Oral)   Resp 16   Ht 5' 7\" (1.702 m)   Wt 230 lb 14.4 oz (104.7 kg)   SpO2 100%   BMI 36.16 kg/m²     General:  Alert, cooperative, no distress, appears stated age. Head:  Normocephalic, without obvious abnormality, atraumatic. Eyes:  Conjunctivae/corneas clear. PERRL, EOMs intact. Fundi benign. Ears:  Normal TMs and external ear canals both ears. Nose: Nares normal. Septum midline. Mucosa normal. No drainage or sinus tenderness. Throat: Lips, mucosa, and tongue normal. Teeth and gums normal.   Neck: Supple, symmetrical, trachea midline, no adenopathy, thyroid: no enlargement/tenderness/nodules, no carotid bruit and no JVD. Back:   Symmetric, no curvature. ROM normal. No CVA tenderness. Lungs:   Clear to auscultation bilaterally. Chest wall:  No tenderness or deformity. Heart:  Regular rate and rhythm, S1, S2 normal, no murmur, click, rub or gallop. Abdomen:   Mildly tender right upper and lower quadrants Bowel sounds normal. No masses,  No organomegaly. Extremities: Extremities normal, atraumatic, no cyanosis or edema. Pulses: 2+ and symmetric all extremities. Skin: Skin color, texture, turgor normal. No rashes or lesions. Lymph nodes: Cervical, supraclavicular, and axillary nodes normal.   Neurologic: CNII-XII intact. Normal strength, sensation and reflexes throughout. Assessment/Plan:       ICD-10-CM ICD-9-CM    1. PCOS (polycystic ovarian syndrome) E28.2 256.4 Start metformin  GYN consult   2.  Constipation, unspecified constipation type K59.00 564.00 REFERRAL TO GASTROENTEROLOGY   3. Rectal bleeding  ? hemorrhoids K62.5 569.3 REFERRAL TO GASTROENTEROLOGY  Anusol        Follow-up Disposition: Not on File   Advised her to call back or return to office if symptoms worsen/change/persist.  Discussed expected course/resolution/complications of diagnosis in detail with patient. Medication risks/benefits/costs/interactions/alternatives discussed with patient. She was given an after visit summary which includes diagnoses, current medications, & vitals. She expressed understanding with the diagnosis and plan.

## 2018-10-29 NOTE — LETTER
[address] 
[facility] EMERGENCY DEPT 
[address] [facility] [facility] [address] [id number] 
[phone] Work/School Note Date: 10/29/2018 To Whom It May concern: 
 
Hanh Triana was seen and treated today in the emergency room by the following provider(s): 
Attending Provider: Ida La MD.   
 
Hanh Triana may return to work on 10/31/18. Sincerely, Rose Mary Berg MD

## 2018-10-29 NOTE — PROGRESS NOTES
Shabana Lopez is a 26 y.o. female    Chief Complaint   Patient presents with    Physical     1. Have you been to the ER, urgent care clinic since your last visit? Hospitalized since your last visit? No     2. Have you seen or consulted any other health care providers outside of the [address] [place] [address] since your last visit? Include any pap smears or colon screening.   No     Visit Vitals  /86   Pulse (!) 106   Temp 98.5 °F (36.9 °C) (Oral)   Resp 16   Ht 5' 7\" (1.702 m)   Wt 230 lb 14.4 oz (104.7 kg)   SpO2 100%   BMI 36.16 kg/m²

## 2018-10-30 VITALS
DIASTOLIC BLOOD PRESSURE: 85 MMHG | HEART RATE: 104 BPM | BODY MASS INDEX: 36.1 KG/M2 | HEIGHT: 67 IN | SYSTOLIC BLOOD PRESSURE: 129 MMHG | TEMPERATURE: 98.5 F | RESPIRATION RATE: 16 BRPM | OXYGEN SATURATION: 99 % | WEIGHT: 230 LBS

## 2018-10-30 NOTE — ED TRIAGE NOTES
Pt arrives with c/o of constipation for the last 9 days, [facility] and [facility] showed fecal [facility]action, [facility] sent pt home and told to do enema, drink milk of magnesia, laxatives and miralax with no relief pt states has increased nausea now.

## 2018-10-30 NOTE — DISCHARGE INSTRUCTIONS
Constipation: Care Instructions  Your Care Instructions    Constipation means that you have a hard time passing stools (bowel movements). People pass stools from 3 times a day to once every 3 days. What is normal for you may be different. Constipation may occur with pain in the rectum and cramping. The pain may get worse when you try to pass stools. Sometimes there are small amounts of bright red blood on toilet paper or the surface of stools. This is because of enlarged veins near the rectum (hemorrhoids). A few changes in your diet and lifestyle may help you avoid ongoing constipation. Your doctor may also prescribe medicine to help loosen your stool. Some medicines can cause constipation. These include pain medicines and antidepressants. Tell your doctor about all the medicines you take. Your doctor may want to make a medicine change to ease your symptoms. Follow-up care is a key part of your treatment and safety. Be sure to make and go to all appointments, and call your doctor if you are having problems. It's also a good idea to know your test results and keep a list of the medicines you take. How can you care for yourself at home? · Drink plenty of fluids, enough so that your urine is light yellow or clear like water. If you have kidney, heart, or liver disease and have to limit fluids, talk with your doctor before you increase the amount of fluids you drink. · Include high-fiber foods in your diet each day. These include fruits, vegetables, beans, and whole grains. · Get at least 30 minutes of exercise on most days of the week. Walking is a good choice. You also may want to do other activities, such as running, swimming, cycling, or playing tennis or team sports. · Take a fiber supplement, such as Citrucel or Metamucil, every day. Read and follow all instructions on the label. · Schedule time each day for a bowel movement. A daily routine may help.  Take your time having your bowel movement. · Support your feet with a small step stool when you sit on the toilet. This helps flex your hips and places your pelvis in a squatting position. · Your doctor may recommend an over-the-counter laxative to relieve your constipation. Examples are Milk of Magnesia and MiraLax. Read and follow all instructions on the label. Do not use laxatives on a long-term basis. When should you call for help? Call your doctor now or seek immediate medical care if:    · You have new or worse belly pain.     · You have new or worse nausea or vomiting.     · You have blood in your stools.    Watch closely for changes in your health, and be sure to contact your doctor if:    · Your constipation is getting worse.     · You do not get better as expected. Where can you learn more? Go to http://vaughn-shemar.info/. Enter [id number] in the search box to learn more about \"Constipation: Care Instructions. \"  Current as of: November 20, 2017  Content Version: 11.8  © 8997-8342 Healthwise, Incorporated. Care instructions adapted under license by [employer] (which disclaims liability or warranty for this information). If you have questions about a medical condition or this instruction, always ask your healthcare professional. [address] any warranty or liability for your use of this information.

## 2018-10-31 NOTE — ED PROVIDER NOTES
Hx HTN; presents with constipation; states no significant BM for the past 9 days; associated abd cramping; pain is moderate; she has tried multiple OTC medications - Miralax, Fleet enema, \"laxative\" without relief. No hx same. Saw PCP today who told her to continue what she had tried previously and to f/u with GI. Past Medical History:   Diagnosis Date    Congenital absence of one kidney     Hidradenitis suppurativa     Hypertension     Obesity        Past Surgical History:   Procedure Laterality Date    HX ADENOIDECTOMY      HX TONSILLECTOMY           Family History:   Problem Relation Age of Onset    Hypertension Mother    [facility] Lupus Mother     Diabetes Father     Diabetes Maternal Grandmother     Hypertension Maternal Grandmother     Diabetes Paternal Grandmother        Social History     Socioeconomic History    Marital status: SINGLE     Spouse name: Not on file    Number of children: Not on file    Years of education: Not on file    Highest education level: Not on file   Social Needs    Financial resource strain: Not on file    Food insecurity - worry: Not on file    Food insecurity - inability: Not on file   [employer] needs - medical: Not on file   [employer] needs - non-medical: Not on file   Occupational History    Not on file   Tobacco Use    Smoking status: Never Smoker    Smokeless tobacco: Never Used   Substance and Sexual Activity    Alcohol use: No    Drug use: No    Sexual activity: Yes     Partners: Male     Birth control/protection: IUD   Other Topics Concern    Not on file   Social History Narrative    Not on file         ALLERGIES: Patient has no known allergies. Review of Systems   All other systems reviewed and are negative.       Vitals:    10/29/18 2229 10/30/18 0033   BP: 143/87 129/85   Pulse: (!) 113 (!) 104   Resp: 16 16   Temp: 98.5 °F (36.9 °C)    SpO2: 100% 99%   Weight: 104.3 kg (230 lb)    Height: 5' 7\" (1.702 m) Physical Exam   Constitutional: She appears well-developed and well-nourished. HENT:   Head: Normocephalic and atraumatic. Eyes: Conjunctivae are normal.   Neck: Neck supple. No tracheal deviation present. Cardiovascular: Regular rhythm, normal heart sounds and intact distal pulses. Exam reveals no gallop and no friction rub. No murmur heard. tachycardia   Pulmonary/Chest: Effort normal and breath sounds normal.   Abdominal: Soft. There is no tenderness. Musculoskeletal: She exhibits no edema or deformity. Neurological: She is alert. oriented   Skin: Skin is warm and dry. Psychiatric: She has a normal mood and affect. Nursing note and vitals reviewed. MDM       Procedures    Progress Note:  Results, treatment, and follow up plan have been discussed with patient. Questions were answered. Yen Galvan MD     A/P: constipation - unclear reason; reassuring appearance and exam; VSS (HR trending down); KUB c/w constipation; GoLyt[employer].   Yen Galvan MD

## 2018-11-28 ENCOUNTER — OFFICE VISIT (OUTPATIENT)
Dept: INTERNAL MEDICINE CLINIC | Facility: CLINIC | Age: 23
End: 2018-11-28

## 2018-11-28 VITALS
RESPIRATION RATE: 18 BRPM | HEIGHT: 67 IN | HEART RATE: 109 BPM | SYSTOLIC BLOOD PRESSURE: 127 MMHG | DIASTOLIC BLOOD PRESSURE: 88 MMHG | TEMPERATURE: 98.4 F | WEIGHT: 234 LBS | BODY MASS INDEX: 36.73 KG/M2

## 2018-11-28 DIAGNOSIS — E28.2 PCOS (POLYCYSTIC OVARIAN SYNDROME): ICD-10-CM

## 2018-11-28 DIAGNOSIS — E66.01 MORBID OBESITY (HCC): ICD-10-CM

## 2018-11-28 DIAGNOSIS — R63.5 WEIGHT GAIN: Primary | ICD-10-CM

## 2018-11-28 LAB — HBA1C MFR BLD HPLC: 5.4 %

## 2018-11-28 RX ORDER — METFORMIN HYDROCHLORIDE 500 MG/1
500 TABLET ORAL 2 TIMES DAILY WITH MEALS
Qty: 60 TAB | Refills: 3 | Status: SHIPPED | OUTPATIENT
Start: 2018-11-28 | End: 2019-06-23

## 2018-11-28 NOTE — PROGRESS NOTES
Subjective:      Jacob Wiley is a 26 y.o. female who presents today for   Chief Complaint   Patient presents with    Weight Loss     BMI 36.6  Patient interested in discussing weight loss options  She says this has been going on for 2 years and she continues to gain weight each year. She has tried Weight watchers, Om[employer], keto diet, mediterranean  With her weight gain she develops boils  TSH normal  She does have PCOS and is taking metformin once daily            Patient Active Problem List    Diagnosis Date Noted    Severe obesity ([address] 10/09/2018    Migraine without status migrainosus, not intractable 10/09/2018    Hirsutism 10/09/2018     Current Outpatient Medications   Medication Sig Dispense Refill    metFORMIN (GLUCOPHAGE) 500 mg tablet Take 1 Tab by mouth daily (with breakfast). 30 Tab 3    norethindrone (ABRAM) 0.35 mg tab Take 1 Tab by mouth daily. 30 Tab 2    lisinopril-hydroCHLOROthiazide (PRINZIDE, ZESTORETIC) 10-12.5 mg per tablet Take 1 Tab by mouth daily. 30 Tab 3    hydrocortisone (ANUSOL-HC) 25 mg supp Insert 1 Suppository into rectum every twelve (12) hours. 30 Suppository 0    hydrocortisone (HYTONE) 2.5 % topical cream Apply  to affected area two (2) times a day.  use thin layer 30 g 0     No Known Allergies  Past Medical History:   Diagnosis Date    Congenital absence of one kidney     Hidradenitis suppurativa     Hypertension     Obesity      Past Surgical History:   Procedure Laterality Date    HX ADENOIDECTOMY      HX TONSILLECTOMY       Family History   Problem Relation Age of Onset    Hypertension Mother    [place] Lupus Mother     Diabetes Father     Diabetes Maternal Grandmother     Hypertension Maternal Grandmother     Diabetes Paternal Grandmother      Social History     Tobacco Use    Smoking status: Never Smoker    Smokeless tobacco: Never Used   Substance Use Topics    Alcohol use: No        Review of Systems    A comprehensive review of systems was negative except for that written in the HPI. Objective:     Visit Vitals  /88   Pulse (!) 109   Temp 98.4 °F (36.9 °C) (Oral)   Resp 18   Ht 5' 7\" (1.702 m)   Wt 234 lb (106.1 kg)   LMP  (LMP Unknown)   BMI 36.65 kg/m²     General:  Alert, cooperative, no distress, appears stated age. Head:  Normocephalic, without obvious abnormality, atraumatic. Eyes:  Conjunctivae/corneas clear. PERRL, EOMs intact. Fundi benign. Ears:  Normal TMs and external ear canals both ears. Nose: Nares normal. Septum midline. Mucosa normal. No drainage or sinus tenderness. Throat: Lips, mucosa, and tongue normal. Teeth and gums normal.   Neck: Supple, symmetrical, trachea midline, no adenopathy, thyroid: no enlargement/tenderness/nodules, no carotid bruit and no JVD. Back:   Symmetric, no curvature. ROM normal. No CVA tenderness. Lungs:   Clear to auscultation bilaterally. Chest wall:  No tenderness or deformity. Heart:  Regular rate and rhythm, S1, S2 normal, no murmur, click, rub or gallop. Abdomen:   Soft, non-tender. Bowel sounds normal. No masses,  No organomegaly. Extremities: Extremities normal, atraumatic, no cyanosis or edema. Pulses: 2+ and symmetric all extremities. Skin: Skin color, texture, turgor normal. No rashes or lesions. Lymph nodes: Cervical, supraclavicular, and axillary nodes normal.   Neurologic: CNII-XII intact. Normal strength, sensation and reflexes throughout. Assessment/Plan:       ICD-10-CM ICD-9-CM    1. Weight gain R63.5 783.1   Work on persistent weight gain  calorie reduction  Regular exercise   2. PCOS (polycystic ovarian syndrome) E28.2 256.4 REFERRAL TO WEIGHT LOSS      AMB POC HEMOGLOBIN A1C  Increase metformin to 500 mg po bid   3.  Morbid obesity ([facility] [address] E66.01 278.01 REFERRAL TO WEIGHT LOSS       Follow-up Disposition: Not on File   Advised her to call back or return to office if symptoms worsen/change/persist.  Discussed expected course/resolution/complications of diagnosis in detail with patient. Medication risks/benefits/costs/interactions/alternatives discussed with patient. She was given an after visit summary which includes diagnoses, current medications, & vitals. She expressed understanding with the diagnosis and plan.

## 2018-11-28 NOTE — PROGRESS NOTES
Chief Complaint   Patient presents with    Weight Loss     1. Have you been to the ER, urgent care clinic since your last visit? Hospitalized since your last visit? No    2. Have you seen or consulted any other health care providers outside of the [address] [place] [address] since your last visit? Include any pap smears or colon screening.  No

## 2019-02-27 ENCOUNTER — TELEPHONE (OUTPATIENT)
Dept: OBGYN CLINIC | Age: 24
End: 2019-02-27

## 2019-02-27 NOTE — TELEPHONE ENCOUNTER
Message left at 5am    27year old patient last seen in the office on 10/17/18. Patient left message about follow up. Patient states she was diagnosed with pcos and has been having  3 emotional outbursts a week. Patient is requesting to see a Doctor. Patient placed on the schedule to see Dr. Mayra Munoz on requested dated of 318/19 at 1:40 PM    Patient verbalized understanding.

## 2019-03-16 RX ORDER — ACETAMINOPHEN AND CODEINE PHOSPHATE 120; 12 MG/5ML; MG/5ML
SOLUTION ORAL
Qty: 28 TAB | Refills: 2 | Status: SHIPPED | OUTPATIENT
Start: 2019-03-16 | End: 2019-03-29

## 2019-03-16 NOTE — TELEPHONE ENCOUNTER
Can we call this patient  Can refill until she can get in for [facility] [place] visit

## 2019-03-18 ENCOUNTER — OFFICE VISIT (OUTPATIENT)
Dept: OBGYN CLINIC | Age: 24
End: 2019-03-18

## 2019-03-18 VITALS
BODY MASS INDEX: 35.44 KG/M2 | WEIGHT: 225.8 LBS | DIASTOLIC BLOOD PRESSURE: 84 MMHG | SYSTOLIC BLOOD PRESSURE: 138 MMHG | HEIGHT: 67 IN

## 2019-03-18 DIAGNOSIS — N92.6 IRREGULAR PERIODS: Primary | ICD-10-CM

## 2019-03-18 DIAGNOSIS — Z01.419 WELL WOMAN EXAM: ICD-10-CM

## 2019-03-18 DIAGNOSIS — E28.2 PCOS (POLYCYSTIC OVARIAN SYNDROME): ICD-10-CM

## 2019-03-18 DIAGNOSIS — Z11.3 SCREENING EXAMINATION FOR VENEREAL DISEASE: ICD-10-CM

## 2019-03-18 NOTE — PATIENT INSTRUCTIONS
Polycystic Ovary Syndrome: Care Instructions  Your Care Instructions  Polycystic ovary syndrome, or PCOS, means a woman's hormones are out of balance. It can cause problems with your periods and make it hard to get pregnant. Doctors don't know for sure what causes PCOS, but it seems to run in families. It also seems to be linked to obesity and a risk for diabetes. If you have PCOS, your sisters and daughters have a higher chance of getting it too. You may have other symptoms. These include weight gain, acne, too much hair growth on the face or body, high blood pressure, and high blood sugar. Your ovaries may have cysts on them. These cysts are growths filled with fluid. Keep in mind that although you may not have regular periods, you can still get pregnant. Talk to your doctor about birth control if you do not want to get pregnant. Sometimes the hormone changes with PCOS can also make it hard for some women to get pregnant. If this is a concern, talk to your doctor about treatment for this problem. Women who have PCOS can go for months or longer with no period. Your doctor may recommend medicines that can help get your cycles back to normal.  Follow-up care is a key part of your treatment and safety. Be sure to make and go to all appointments, and call your doctor if you are having problems. It's also a good idea to know your test results and keep a list of the medicines you take. How can you care for yourself at home? · Take your medicines exactly as prescribed. Call your doctor if you think you are having a problem with your medicine. · Eat a healthy diet. Include fruits, vegetables, beans, and whole grains in your diet each day. · If you are overweight, losing weight can help with many of the symptoms of PCOS. Talk to your doctor about safe ways to lose weight. · Get at least 30 minutes of exercise on most days of the week. Walking is a good choice.  Or you can run, swim, cycle, or play tennis or team sports. · For hair growth you don't want, try bleaching, plucking, electrolysis, or laser therapy. · Acne can be treated with over-the-counter medicines. Look for ones that have benzoyl peroxide or salicylic acid in them. When should you call for help? Call your doctor now or seek immediate medical care if:    · You have severe vaginal bleeding.     · You have new or worse belly or pelvic pain.    Watch closely for changes in your health, and be sure to contact your doctor if:    · You do not get better as expected.     · You have unusual vaginal bleeding. Where can you learn more? Go to http://vaughn-shemar.info/. Enter G784 in the search box to learn more about \"Polycystic Ovary Syndrome: Care Instructions. \"  Current as of: May 14, 2018  Content Version: 11.9  © 1600-9897 [employer]. Care instructions adapted under license by [employer] (which disclaims liability or warranty for this information). If you have questions about a medical condition or this instruction, always ask your healthcare professional. [address] any warranty or liability for your use of this information.

## 2019-03-18 NOTE — PROGRESS NOTES
Annual Exam    Moisés Trinh is a 27 y.o.  A0 presenting for annual exam and follow up of PCOS. Her main concern today is mood swings and excess hair growth on chin, requiring her to wax. Also with ~50lb weight gain in past 2 yrs. Diagnosed with PCOS at last office visit with midwife based on polycystic appearance to ovaries and clinical signs of hyperandrogenism. Pt previously on Mirena IUD which was removed at last visit as it was noted to be in the cervix. Pt was then started on micronor OCPs. Pt reports irregular light bleeding with progestin contraceptives, however, notes regular monthly cycles prior to Mirena IUD. She is not currently sexually active and would like to try taking a break from hormones. PCP started her on metformin at last visit d/t h/o IGT, acanthosis nigricans, and PCOS. Pt is interested in Gardasil, does not know if she has had a pap smear. Also notes she had a false positive test for syphilis, but confirmatory testing was negative. She was advised to be evaluated for Lupus (mother has a h/o lupus). PMH - HTN, PCOS, IGT, obesity (BMI 35), congenital absence of 1 kidney, hidradenitis    Ob/Gyn Hx:   A0 -1 vaginal delivery  LMP-2019  Menses- irregular  Contraception-POP  STI- denies  ? SA-not currently    Health maintenance:  Pap- never  Gardasil-never    Past Medical History:   Diagnosis Date    Congenital absence of one kidney     Hidradenitis suppurativa     Hypertension     Obesity        Past Surgical History:   Procedure Laterality Date    HX ADENOIDECTOMY      HX TONSILLECTOMY         Family History   Problem Relation Age of Onset    Hypertension Mother    [employer] Lupus Mother     Diabetes Father     Diabetes Maternal Grandmother     Hypertension Maternal Grandmother     Diabetes Paternal Grandmother        Social History     Socioeconomic History    Marital status: SINGLE     Spouse name: Not on file    Number of children: Not on file    Years of education: Not on file    Highest education level: Not on file   Social Needs    Financial resource strain: Not on file    Food insecurity - worry: Not on file    Food insecurity - inability: Not on file    Transportation needs - medical: Not on file   [employer] needs - non-medical: Not on file   Occupational History    Not on file   Tobacco Use    Smoking status: Never Smoker    Smokeless tobacco: Never Used   Substance and Sexual Activity    Alcohol use: No    Drug use: No    Sexual activity: Not Currently     Partners: Male     Birth control/protection: Pill   Other Topics Concern    Not on file   Social History Narrative    Not on file       Current Outpatient Medications   Medication Sig Dispense Refill    norethindrone (MICRONOR) 0.35 mg tab TAKE 1 TABLET BY MOUTH ONCE DAILY 28 Tab 2    metFORMIN (GLUCOPHAGE) 500 mg tablet Take 1 Tab by mouth two (2) times daily (with meals). 60 Tab 3    lisinopril-hydroCHLOROthiazide (PRINZIDE, ZESTORETIC) 10-12.5 mg per tablet Take 1 Tab by mouth daily. 30 Tab 3    hydrocortisone (ANUSOL-HC) 25 mg supp Insert 1 Suppository into rectum every twelve (12) hours. 30 Suppository 0    hydrocortisone (HYTONE) 2.5 % topical cream Apply  to affected area two (2) times a day.  use thin layer 30 g 0       No Known Allergies    Review of Systems - History obtained from the patient  Constitutional: negative for weight loss, fever, night sweats, +weight gain  HEENT: negative for hearing loss, earache, congestion, snoring, sorethroat  CV: negative for chest pain, palpitations, edema  Resp: negative for cough, shortness of breath, wheezing  GI: negative for change in bowel habits, abdominal pain, black or bloody stools  : negative for frequency, dysuria, hematuria, vaginal discharge  MSK: negative for back pain, joint pain, muscle pain  Breast: negative for breast lumps, nipple discharge, galactorrhea  Skin :negative for itching, rash, hives, +acne and unwanted hair growth  Neuro: negative for dizziness, headache, confusion, weakness  Psych: negative for anxiety, depression, +change in mood  Heme/lymph: negative for bleeding, bruising, pallor    Physical Exam  Visit Vitals  /84 (BP 1 Location: Left arm, BP Patient Position: Sitting)   Ht 5' 7\" (1.702 m)   Wt 225 lb 12.8 oz (102.4 kg)   BMI 35.37 kg/m²     Constitutional  · Appearance: well-nourished, well developed, alert, in no acute distress, +obesity    HENT  · Head and Face: appears normal    Neck  · Inspection/Palpation: normal appearance, no masses or tenderness, +acanthosis  · Lymph Nodes: no lymphadenopathy present  · Thyroid: gland size normal, nontender, no nodules or masses present on palpation    Chest  · Respiratory Effort: non-labored breathing  · Auscultation: CTAB, normal breath sounds    Cardiovascular  · Heart:  · Auscultation: regular rate and rhythm without murmur  · Extremities: no peripheral edema    Breasts  · Inspection of Breasts: breasts symmetrical, no skin changes, no discharge present, nipple appearance normal, no skin retraction present  · Palpation of Breasts and Axillae: no masses present on palpation, no breast tenderness  · Axillary Lymph Nodes: no lymphadenopathy present    Gastrointestinal  · Abdominal Examination: abdomen non-tender to palpation, normal bowel sounds, no masses present  · Liver and spleen: no hepatomegaly present, spleen not palpable  · Hernias: no hernias identified    Genitourinary  · External Genitalia: normal appearance for age, no discharge present, no tenderness present, no inflammatory lesions present, no masses present, no atrophy present  · Vagina: normal vaginal vault without central or paravaginal defects, no discharge present, no inflammatory lesions present, no masses present  · Bladder: non-tender to palpation  · Urethra: appears normal  · Cervix: normal   · Uterus: normal size, shape and consistency  · Adnexa: no adnexal tenderness present, no adnexal masses present  · Perineum: perineum within normal limits, no evidence of trauma, no rashes or skin lesions present    Skin  · General Inspection: no rash, no lesions identified    Neurologic/Psychiatric  · Mental Status:  · Orientation: grossly oriented to person, place and time  · Mood and Affect: mood normal, affect appropriate      Assessment/Plan:  27 y.o. Michelle Baker presenting for annual exam and follow up of PCOS     Health maintenance:  -diet/exercise/healthy lifestyle/weight loss  -pap today  -STI screen declined as she had recent STI [facility] at [facility]   -consideringardasil    PCOS:  -labs: TSH wnl at prior visit, will check PRL, PCOS labs and A1c today  -encouraged weight loss  -continue metformin  -d/c micronor d/t SE's (not currently SA)  -menstrual calendar to see menstrual pattern without progestin contraceptives (limited to progestin-only methods d/t HTN)  -discussed spironolactone, but advised her to check with PCP d/t h/o single kindey   -recommend non-hormonal options for hair removal --> electrolysis, waxing, etc, could consider topical eflorinthine cream in the future    RTC: 3 months for follow up, 1 year for AE, or sooner prchioma Prasad MD  3/18/2019  2:15 PM

## 2019-03-19 LAB — HIV 1+2 AB+HIV1 P24 AG SERPL QL IA: NON REACTIVE

## 2019-03-20 RX ORDER — HYDROCORTISONE 25 MG/G
CREAM TOPICAL
Qty: 30 G | Refills: 0 | Status: SHIPPED | OUTPATIENT
Start: 2019-03-20 | End: 2019-06-23

## 2019-03-21 RX ORDER — LISINOPRIL AND HYDROCHLOROTHIAZIDE 10; 12.5 MG/1; MG/1
1 TABLET ORAL DAILY
Qty: 30 TAB | Refills: 3 | Status: SHIPPED | OUTPATIENT
Start: 2019-03-21 | End: 2020-01-13 | Stop reason: SDUPTHER

## 2019-03-22 LAB
17OHP SERPL-MCNC: 181 NG/DL
EST. AVERAGE GLUCOSE BLD GHB EST-MCNC: 103 MG/DL
HBA1C MFR BLD: 5.2 % (ref 4.8–5.6)
PROLACTIN SERPL-MCNC: 9.8 NG/ML (ref 4.8–23.3)
TESTOST FREE SERPL-MCNC: 1.5 PG/ML (ref 0–4.2)
TESTOST SERPL-MCNC: 35 NG/DL (ref 8–48)

## 2019-03-23 LAB
CYTOLOGIST CVX/VAG CYTO: ABNORMAL
CYTOLOGY CVX/VAG DOC THIN PREP: ABNORMAL
DX ICD CODE: ABNORMAL
DX ICD CODE: ABNORMAL
HPV I/H RISK 1 DNA CVX QL PROBE+SIG AMP: NEGATIVE
LABCORP, 190119: ABNORMAL
Lab: ABNORMAL
OTHER STN SPEC: ABNORMAL
PATH REPORT.FINAL DX SPEC: ABNORMAL
PATHOLOGIST CVX/VAG CYTO: ABNORMAL
STAT OF ADQ CVX/VAG CYTO-IMP: ABNORMAL

## 2019-03-27 ENCOUNTER — OFFICE VISIT (OUTPATIENT)
Dept: INTERNAL MEDICINE CLINIC | Facility: CLINIC | Age: 24
End: 2019-03-27

## 2019-03-27 VITALS
RESPIRATION RATE: 16 BRPM | WEIGHT: 220 LBS | HEART RATE: 80 BPM | TEMPERATURE: 98.2 F | SYSTOLIC BLOOD PRESSURE: 125 MMHG | BODY MASS INDEX: 34.53 KG/M2 | HEIGHT: 67 IN | DIASTOLIC BLOOD PRESSURE: 82 MMHG

## 2019-03-27 DIAGNOSIS — Z01.818 PREOP EXAMINATION: Primary | ICD-10-CM

## 2019-03-27 NOTE — PROGRESS NOTES
Subjective:      Sahra Cuadra is a 27 y.o. female who presents today for   Chief Complaint   Patient presents with    Pre-op Exam     fat transfer surgery. 4/10/19   patient  With a history of hypertension, PCOS and obesity is undergoing surgery for abdominal liposuction and fat transfer to the hips. She is seeing plastic surgeon Jayden Wiley. She had labs done per surgeon  which will be reviewed today  A1c 5.2        Patient Active Problem List    Diagnosis Date Noted    Severe obesity ([address] 10/09/2018    Migraine without status migrainosus, not intractable 10/09/2018    Hirsutism 10/09/2018     Current Outpatient Medications   Medication Sig Dispense Refill    lisinopril-hydroCHLOROthiazide (PRINZIDE, ZESTORETIC) 10-12.5 mg per tablet Take 1 Tab by mouth daily. 30 Tab 3    hydrocortisone (HYTONE) 2.5 % topical cream APPLY  THIN LAYER TO AFFECTED AREA TWICE DAILY 30 g 0    metFORMIN (GLUCOPHAGE) 500 mg tablet Take 1 Tab by mouth two (2) times daily (with meals). 60 Tab 3    norethindrone (MICRONOR) 0.35 mg tab TAKE 1 TABLET BY MOUTH ONCE DAILY 28 Tab 2    hydrocortisone (ANUSOL-HC) 25 mg supp Insert 1 Suppository into rectum every twelve (12) hours.  30 Suppository 0     No Known Allergies  Past Medical History:   Diagnosis Date    Congenital absence of one kidney     Hidradenitis suppurativa     Hypertension     Obesity      Past Surgical History:   Procedure Laterality Date    HX ADENOIDECTOMY      HX TONSILLECTOMY       Family History   Problem Relation Age of Onset    Hypertension Mother    [employer] Lupus Mother     Diabetes Father     Diabetes Maternal Grandmother     Hypertension Maternal Grandmother     Diabetes Paternal Grandmother      Social History     Tobacco Use    Smoking status: Never Smoker    Smokeless tobacco: Never Used   Substance Use Topics    Alcohol use: No        Review of Systems    A comprehensive review of systems was negative except for that written in the HPI.     Objective:     Visit Vitals  /82   Pulse 80   Temp 98.2 °F (36.8 °C) (Oral)   Resp 16   Ht 5' 7\" (1.702 m)   Wt 220 lb (99.8 kg)   BMI 34.46 kg/m²     General:  Alert, cooperative, no distress, appears stated age. Head:  Normocephalic, without obvious abnormality, atraumatic. Eyes:  Conjunctivae/corneas clear. PERRL, EOMs intact. Ears:  Normal TMs and external ear canals both ears. Nose: Nares normal. Septum midline. Mucosa normal. No drainage or sinus tenderness. Throat: Lips, mucosa, and tongue normal. Teeth and gums normal.   Neck: Supple, symmetrical, trachea midline, no adenopathy, thyroid: no enlargement/tenderness/nodules, no carotid bruit and no JVD. Back:   Symmetric, no curvature. ROM normal. No CVA tenderness. Lungs:   Clear to auscultation bilaterally. Chest wall:  No tenderness or deformity. Heart:  Regular rate and rhythm, S1, S2 normal, no murmur, click, rub or gallop. Abdomen:   Obese abdomen Soft, non-tender. Bowel sounds normal. No masses,  No organomegaly. Extremities: Extremities normal, atraumatic, no cyanosis or edema. Pulses: 2+ and symmetric all extremities. Skin: Skin color, texture, turgor normal. No rashes or lesions. Neurologic: CNII-XII intact. Normal strength, sensation and reflexes throughout. Assessment/Plan:       ICD-10-CM ICD-9-CM    1. Preop examination Z01.818 V72.84 AMB POC EKG ROUTINE W/ 12 LEADS, INTER & REP    Will need to discuss EKG with cardiology prior to clearance  NSR rate 74, low voltage, questionable negative t waves          Advised her to call back or return to office if symptoms worsen/change/persist.  Discussed expected course/resolution/complications of diagnosis in detail with patient. Medication risks/benefits/costs/interactions/alternatives discussed with patient. She was given an after visit summary which includes diagnoses, current medications, & vitals.   She expressed understanding with the diagnosis and plan.

## 2019-03-27 NOTE — LETTER
NOTIFICATION OF RETURN TO WORK / SCHOOL 
3/27/2019 Ms. Minda Bradford [facility] [id number] To Whom It May Concern: 
 
Minda Bradford was under the care of [facility] Please excuse from work 3/27/19 due to a medical appointment. If there are questions or concerns please have the patient contact our office. Sincerely, Paris Gardner MD

## 2019-03-27 NOTE — PROGRESS NOTES
Chief Complaint   Patient presents with    Pre-op Exam     fat transfer surgery. 4/10/19     1. Have you been to the ER, urgent care clinic since your last visit? Hospitalized since your last visit? No    2. Have you seen or consulted any other health care providers outside of the [address] [place] [address] since your last visit? Include any pap smears or colon screening.  No

## 2019-03-29 NOTE — PROGRESS NOTES
Dr. Han Quest   Can you take a look at this EKG  She came in for preop screening prior to liposuction  She has a history of obesity, PCOS and hypertension  She has no complaints

## 2019-06-21 ENCOUNTER — OFFICE VISIT (OUTPATIENT)
Dept: INTERNAL MEDICINE CLINIC | Facility: CLINIC | Age: 24
End: 2019-06-21

## 2019-06-21 VITALS
RESPIRATION RATE: 16 BRPM | WEIGHT: 221 LBS | SYSTOLIC BLOOD PRESSURE: 122 MMHG | BODY MASS INDEX: 34.69 KG/M2 | HEIGHT: 67 IN | HEART RATE: 86 BPM | TEMPERATURE: 98.9 F | DIASTOLIC BLOOD PRESSURE: 83 MMHG

## 2019-06-21 DIAGNOSIS — R21 RASH: ICD-10-CM

## 2019-06-21 DIAGNOSIS — R53.83 FATIGUE, UNSPECIFIED TYPE: ICD-10-CM

## 2019-06-21 DIAGNOSIS — Z82.69 FAMILY HISTORY OF SYSTEMIC LUPUS ERYTHEMATOSUS: Primary | ICD-10-CM

## 2019-06-21 DIAGNOSIS — M25.50 ARTHRALGIA, UNSPECIFIED JOINT: ICD-10-CM

## 2019-06-21 RX ORDER — KETOCONAZOLE 20 MG/G
CREAM TOPICAL DAILY
Qty: 30 G | Refills: 0 | Status: SHIPPED | OUTPATIENT
Start: 2019-06-21 | End: 2022-02-07

## 2019-06-21 NOTE — PROGRESS NOTES
Subjective:      Srinivasan Riggs is a 27 y.o. female who presents today for   Chief Complaint   Patient presents with    Lupus     patient is experiencing symptoms of severe fatigue, rash, muscle soreness. cold sensitivity. Family hx of lupus. Patient says in [place] she was evaluated at acute care. Had positive syphilis test which was found to eventually be a false positive    Wants to be checked for SLE and autoimmune. Of note her mother has SLE    She has fatigue, rash, and joint pains which she states she has noticed over the last few months    Patient Active Problem List    Diagnosis Date Noted    Severe obesity ([address] 10/09/2018    Migraine without status migrainosus, not intractable 10/09/2018    Hirsutism 10/09/2018     Current Outpatient Medications   Medication Sig Dispense Refill    lisinopril-hydroCHLOROthiazide (PRINZIDE, ZESTORETIC) 10-12.5 mg per tablet Take 1 Tab by mouth daily. 30 Tab 3    hydrocortisone (HYTONE) 2.5 % topical cream APPLY  THIN LAYER TO AFFECTED AREA TWICE DAILY 30 g 0    metFORMIN (GLUCOPHAGE) 500 mg tablet Take 1 Tab by mouth two (2) times daily (with meals). 61 Tab 3     No Known Allergies  Past Medical History:   Diagnosis Date    Congenital absence of one kidney     Hidradenitis suppurativa     Hypertension     Obesity      Past Surgical History:   Procedure Laterality Date    HX ADENOIDECTOMY      HX TONSILLECTOMY       Family History   Problem Relation Age of Onset    Hypertension Mother    [facility] Lupus Mother     Diabetes Father     Diabetes Maternal Grandmother     Hypertension Maternal Grandmother     Diabetes Paternal Grandmother      Social History     Tobacco Use    Smoking status: Never Smoker    Smokeless tobacco: Never Used   Substance Use Topics    Alcohol use: No        Review of Systems    A comprehensive review of systems was negative except for that written in the HPI.      Objective:     Visit Vitals  /83   Pulse 86   Temp 98.9 °F (37.2 °C) (Oral)   Resp 16   Ht 5' 7\" (1.702 m)   Wt 221 lb (100.2 kg)   BMI 34.61 kg/m²     General:  Alert, cooperative, no distress, appears stated age. Head:  Normocephalic, without obvious abnormality, atraumatic. Eyes:  Conjunctivae/corneas clear. PERRL, EOMs intact. Fundi benign. Ears:  Normal TMs and external ear canals both ears. Nose: Nares normal. Septum midline. Mucosa normal. No drainage or sinus tenderness. Throat: Lips, mucosa, and tongue normal. Teeth and gums normal.   Neck: Supple, symmetrical, trachea midline, no adenopathy, thyroid: no enlargement/tenderness/nodules, no carotid bruit and no JVD. Back:   Symmetric, no curvature. ROM normal. No CVA tenderness. Lungs:   Clear to auscultation bilaterally. Chest wall:  No tenderness or deformity. Heart:  Regular rate and rhythm, S1, S2 normal, no murmur, click, rub or gallop. Abdomen:   Soft, non-tender. Bowel sounds normal. No masses,  No organomegaly. Extremities: Extremities normal, atraumatic, no cyanosis or edema. Pulses: 2+ and symmetric all extremities. Skin: Scattered circular dry lesions along right upper arm and shoulder   Lymph nodes: Cervical, supraclavicular, and axillary nodes normal.   Neurologic: CNII-XII intact. Normal strength, sensation and reflexes throughout. Assessment/Plan:       ICD-10-CM ICD-9-CM    1. Family history of systemic lupus erythematosus Z82.69 V19.4 MARY BY MULTIPLEX FLOW IA, QL   2. Fatigue, unspecified type R53.83 780.79 CBC WITH AUTOMATED DIFF      IRON      VITAMIN D, 25 HYDROXY      VITAMIN B12   3. Arthralgia, unspecified joint M25.50 719.40 SED RATE (ESR)      MARY BY MULTIPLEX FLOW IA, QL      RHEUMATOID FACTOR, QL      URIC ACID   4.  Rash R21 782.1 MARY BY MULTIPLEX FLOW IA, QL      Appears to be fungal    Will try ketaconazole cream as needed          Advised her to call back or return to office if symptoms worsen/change/persist.  Discussed expected course/resolution/complications of diagnosis in detail with patient. Medication risks/benefits/costs/interactions/alternatives discussed with patient. She was given an after visit summary which includes diagnoses, current medications, & vitals. She expressed understanding with the diagnosis and plan.

## 2019-06-21 NOTE — PROGRESS NOTES
Chief Complaint   Patient presents with    Lupus     patient is experiencing symptoms of severe fatigue, rash, muscle soreness. cold sensitivity. Family hx of lupus. 1. Have you been to the ER, urgent care clinic since your last visit? Hospitalized since your last visit? Yes When: january 2019 [facility] Reason for visit: STD screening. 2. Have you seen or consulted any other health care providers outside of the [address] [place] [address] since your last visit? Include any pap smears or colon screening.  No

## 2019-06-25 LAB
25(OH)D3+25(OH)D2 SERPL-MCNC: 10 NG/ML (ref 30–100)
ANA SER QL: NEGATIVE
BASOPHILS # BLD AUTO: 0 X10E3/UL (ref 0–0.2)
BASOPHILS NFR BLD AUTO: 0 %
EOSINOPHIL # BLD AUTO: 0.2 X10E3/UL (ref 0–0.4)
EOSINOPHIL NFR BLD AUTO: 3 %
ERYTHROCYTE [DISTWIDTH] IN BLOOD BY AUTOMATED COUNT: 13.9 % (ref 12.3–15.4)
ERYTHROCYTE [SEDIMENTATION RATE] IN BLOOD BY WESTERGREN METHOD: 11 MM/HR (ref 0–32)
HCT VFR BLD AUTO: 39.8 % (ref 34–46.6)
HGB BLD-MCNC: 13.2 G/DL (ref 11.1–15.9)
IMM GRANULOCYTES # BLD AUTO: 0 X10E3/UL (ref 0–0.1)
IMM GRANULOCYTES NFR BLD AUTO: 0 %
IRON SERPL-MCNC: 46 UG/DL (ref 27–159)
LYMPHOCYTES # BLD AUTO: 2.5 X10E3/UL (ref 0.7–3.1)
LYMPHOCYTES NFR BLD AUTO: 45 %
MCH RBC QN AUTO: 27.4 PG (ref 26.6–33)
MCHC RBC AUTO-ENTMCNC: 33.2 G/DL (ref 31.5–35.7)
MCV RBC AUTO: 83 FL (ref 79–97)
MONOCYTES # BLD AUTO: 0.6 X10E3/UL (ref 0.1–0.9)
MONOCYTES NFR BLD AUTO: 10 %
NEUTROPHILS # BLD AUTO: 2.3 X10E3/UL (ref 1.4–7)
NEUTROPHILS NFR BLD AUTO: 42 %
PLATELET # BLD AUTO: 253 X10E3/UL (ref 150–450)
RBC # BLD AUTO: 4.81 X10E6/UL (ref 3.77–5.28)
RHEUMATOID FACT SERPL-ACNC: 17.9 IU/ML (ref 0–13.9)
URATE SERPL-MCNC: 5.8 MG/DL (ref 2.5–7.1)
VIT B12 SERPL-MCNC: 340 PG/ML (ref 232–1245)
WBC # BLD AUTO: 5.5 X10E3/UL (ref 3.4–10.8)

## 2019-06-26 RX ORDER — HYDROCORTISONE 25 MG/G
CREAM TOPICAL
Qty: 30 G | Refills: 1 | Status: SHIPPED | OUTPATIENT
Start: 2019-06-26 | End: 2022-02-07

## 2019-07-09 ENCOUNTER — TELEPHONE (OUTPATIENT)
Dept: INTERNAL MEDICINE CLINIC | Facility: CLINIC | Age: 24
End: 2019-07-09

## 2019-07-09 RX ORDER — ASPIRIN 325 MG
50000 TABLET, DELAYED RELEASE (ENTERIC COATED) ORAL
Qty: 8 CAP | Refills: 0 | Status: SHIPPED | OUTPATIENT
Start: 2019-07-09 | End: 2020-01-11

## 2019-07-09 NOTE — PROGRESS NOTES
Results were reviewed with pt she will contact rheumatology once she gets her new insurance information.  Donnie Osorio LPN

## 2019-07-09 NOTE — TELEPHONE ENCOUNTER
V. [facility] given by Dr. Jimmie Kelly to send over a prescription for Vitamin D3 50,000units one tab every 7 days for 8 weeks with no refills.  Dora Alonso LPN

## 2019-07-09 NOTE — PROGRESS NOTES
Test was positive for rheumatoid arthritis. It does not mean she has it but she needs to be further evaluated by rheumatology. I will place referral    Also Vit D was extremely low. Please send Vit D3 50,000 international units  1 tab po q week for 8 weeks.

## 2020-01-08 ENCOUNTER — OFFICE VISIT (OUTPATIENT)
Dept: INTERNAL MEDICINE CLINIC | Age: 25
End: 2020-01-08

## 2020-01-08 VITALS
DIASTOLIC BLOOD PRESSURE: 71 MMHG | RESPIRATION RATE: 20 BRPM | SYSTOLIC BLOOD PRESSURE: 117 MMHG | HEIGHT: 67 IN | BODY MASS INDEX: 36.46 KG/M2 | TEMPERATURE: 98.8 F | OXYGEN SATURATION: 99 % | HEART RATE: 103 BPM | WEIGHT: 232.3 LBS

## 2020-01-08 DIAGNOSIS — J10.1 INFLUENZA B: Primary | ICD-10-CM

## 2020-01-08 RX ORDER — DOXYCYCLINE 100 MG/1
CAPSULE ORAL
COMMUNITY
Start: 2020-01-04 | End: 2022-02-07

## 2020-01-08 NOTE — LETTER
NOTIFICATION OF RETURN TO WORK / SCHOOL 
 
1/8/2020 Ms. Srinivasan Riggs [place] [id number] To Whom It May Concern: 
 
Srinivasan Riggs was under the care of [address] [place] [address] and Primary Care. Please excuse from work 1/8/2020 through 1/13/2020. This patient is recovering from 
 
 the flu. If there are questions or concerns please have the patient contact our office. Sincerely, Jovani Thomas MD

## 2020-01-08 NOTE — PROGRESS NOTES
Subjective:      Trevor Mcmanus is a 27 y.o. female who presents today for   Chief Complaint   Patient presents with    ED Follow-up     [facility] and was told she had the flu on Saturday. Influenza B  She was not given tamiflu (out of the window)  Coughing up dark green sputum  CXRAY was done  She has been taking nyquil, and  prescribed doxycycline, tylenol pm  Says she does not feel much better although some improvement    Patient Active Problem List    Diagnosis Date Noted    Severe obesity ([address] 10/09/2018    Migraine without status migrainosus, not intractable 10/09/2018    Hirsutism 10/09/2018     Current Outpatient Medications   Medication Sig Dispense Refill    doxycycline (VIBRAMYCIN) 100 mg capsule       hydrocortisone (HYTONE) 2.5 % topical cream APPLY A THIN LAYER TO AFFECTED AREA TO AFFECTED AREA TWICE DAILY 30 g 1    ketoconazole (NIZORAL) 2 % topical cream Apply  to affected area daily. 30 g 0    lisinopril-hydroCHLOROthiazide (PRINZIDE, ZESTORETIC) 10-12.5 mg per tablet Take 1 Tab by mouth daily. 30 Tab 3    cholecalciferol (VITAMIN D3) 50,000 unit capsule Take 1 Cap by mouth every seven (7) days. 8 Cap 0    naproxen (NAPROSYN) 500 mg tablet Take 1 Tab by mouth two (2) times daily (with meals).  Prn joint pain 30 Tab 0     No Known Allergies  Past Medical History:   Diagnosis Date    Congenital absence of one kidney     Hidradenitis suppurativa     Hypertension     Obesity      Past Surgical History:   Procedure Laterality Date    HX ADENOIDECTOMY      HX TONSILLECTOMY       Family History   Problem Relation Age of Onset    Hypertension Mother    [facility] Lupus Mother     Diabetes Father     Diabetes Maternal Grandmother     Hypertension Maternal Grandmother     Diabetes Paternal Grandmother      Social History     Tobacco Use    Smoking status: Never Smoker    Smokeless tobacco: Never Used   Substance Use Topics    Alcohol use: No        Review of Systems    A comprehensive review of systems was negative except for that written in the HPI. Objective:     Visit Vitals  /71   Pulse (!) 103   Temp 98.8 °F (37.1 °C) (Oral)   Resp 20   Ht 5' 7\" (1.702 m)   Wt 232 lb 4.8 oz (105.4 kg)   SpO2 99%   BMI 36.38 kg/m²     General:  Alert, cooperative, no distress, appears stated age. Head:  Normocephalic, without obvious abnormality, atraumatic. Eyes:  Conjunctivae/corneas clear. PERRL, EOMs intact. Fundi benign. Ears:  Normal TMs and external ear canals both ears. Nose: Nares normal. Septum midline. Mucosa normal. No drainage or sinus tenderness. Throat: Lips, mucosa, and tongue normal. Teeth and gums normal.   Neck: Supple, symmetrical, trachea midline, no adenopathy, thyroid: no enlargement/tenderness/nodules, no carotid bruit and no JVD. Back:   Symmetric, no curvature. ROM normal. No CVA tenderness. Lungs:   Clear to auscultation bilaterally. Chest wall:  No tenderness or deformity. Heart:  Regular rate and rhythm, S1, S2 normal, no murmur, click, rub or gallop. Assessment/Plan:       ICD-10-CM ICD-9-CM    1. Influenza B J10.1 487.1 Supportive care  Bedrest  Fluids  otc analgesics  Will write note to extend time off of work  Excusing 1/8 through 1/13/2020          Advised her to call back or return to office if symptoms worsen/change/persist.  Discussed expected course/resolution/complications of diagnosis in detail with patient. Medication risks/benefits/costs/interactions/alternatives discussed with patient. She was given an after visit summary which includes diagnoses, current medications, & vitals. She expressed understanding with the diagnosis and plan.

## 2020-01-08 NOTE — PROGRESS NOTES
1. Have you been to the ER, urgent care clinic since your last visit? Hospitalized since your last visit? Yes When: 1-4-2020 Reason for visit: cold symptoms    2. Have you seen or consulted any other health care providers outside of the [address] [place] [address] since your last visit? Include any pap smears or colon screening.  [facility]     Wants to discuss ED follow up

## 2020-01-13 ENCOUNTER — OFFICE VISIT (OUTPATIENT)
Dept: INTERNAL MEDICINE CLINIC | Age: 25
End: 2020-01-13

## 2020-01-13 VITALS
HEIGHT: 67 IN | WEIGHT: 236 LBS | OXYGEN SATURATION: 100 % | RESPIRATION RATE: 16 BRPM | BODY MASS INDEX: 37.04 KG/M2 | HEART RATE: 86 BPM | TEMPERATURE: 98.5 F | DIASTOLIC BLOOD PRESSURE: 89 MMHG | SYSTOLIC BLOOD PRESSURE: 133 MMHG

## 2020-01-13 DIAGNOSIS — I10 ESSENTIAL HYPERTENSION: ICD-10-CM

## 2020-01-13 DIAGNOSIS — J10.1 INFLUENZA B: Primary | ICD-10-CM

## 2020-01-13 RX ORDER — LISINOPRIL AND HYDROCHLOROTHIAZIDE 10; 12.5 MG/1; MG/1
1 TABLET ORAL DAILY
Qty: 30 TAB | Refills: 3 | Status: SHIPPED | OUTPATIENT
Start: 2020-01-13 | End: 2020-01-16

## 2020-01-13 NOTE — PROGRESS NOTES
Subjective:      Lolly Greenwood is a 27 y.o. female who presents today for   Chief Complaint   Patient presents with    Letter for School/Work    Sleep Problem   patient recovering from flu, no more fevers and chills, having significant disruption in sleep  Waking up every few hours at night. Denies any hx of insomnia. She says this has been going on since she got sick. Has intermittent cough and sneeze. Patient feels like symptoms have mainly resolved    In terms of fatigue she says has been ongoing over last several weeks. She say rheumatologist told her she has  Fibromyalgia. Patient Active Problem List    Diagnosis Date Noted    Severe obesity ([address] 10/09/2018    Migraine without status migrainosus, not intractable 10/09/2018    Hirsutism 10/09/2018     Current Outpatient Medications   Medication Sig Dispense Refill    doxycycline (VIBRAMYCIN) 100 mg capsule       hydrocortisone (HYTONE) 2.5 % topical cream APPLY A THIN LAYER TO AFFECTED AREA TO AFFECTED AREA TWICE DAILY 30 g 1    ketoconazole (NIZORAL) 2 % topical cream Apply  to affected area daily. 30 g 0    lisinopril-hydroCHLOROthiazide (PRINZIDE, ZESTORETIC) 10-12.5 mg per tablet Take 1 Tab by mouth daily.  30 Tab 3     No Known Allergies  Past Medical History:   Diagnosis Date    Congenital absence of one kidney     Hidradenitis suppurativa     Hypertension     Obesity      Past Surgical History:   Procedure Laterality Date    HX ADENOIDECTOMY      HX TONSILLECTOMY       Family History   Problem Relation Age of Onset    Hypertension Mother    [facility] Lupus Mother     Diabetes Father     Diabetes Maternal Grandmother     Hypertension Maternal Grandmother     Diabetes Paternal Grandmother      Social History     Tobacco Use    Smoking status: Never Smoker    Smokeless tobacco: Never Used   Substance Use Topics    Alcohol use: No        Review of Systems    A comprehensive review of systems was negative except for that written in the HPI.     Objective:     Visit Vitals  /89   Pulse 86   Temp 98.5 °F (36.9 °C) (Oral)   Resp 16   Ht 5' 7\" (1.702 m)   Wt 236 lb (107 kg)   SpO2 100%   BMI 36.96 kg/m²     General:  Alert, cooperative, no distress, appears stated age. Head:  Normocephalic, without obvious abnormality, atraumatic. Eyes:  Conjunctivae/corneas clear. PERRL, EOMs intact. Fundi benign. Ears:  Normal TMs and external ear canals both ears. Nose: Nares normal. Septum midline. Mucosa normal. No drainage or sinus tenderness. Throat: Lips, mucosa, and tongue normal. Teeth and gums normal.   Neck: Supple, symmetrical, trachea midline, no adenopathy, thyroid: no enlargement/tenderness/nodules, no carotid bruit and no JVD. Back:   Symmetric, no curvature. ROM normal. No CVA tenderness. Lungs:   Clear to auscultation bilaterally. Chest wall:  No tenderness or deformity. Heart:  Regular rate and rhythm, S1, S2 normal, no murmur, click, rub or gallop. Abdomen:   Soft, non-tender. Bowel sounds normal. No masses,  No organomegaly. Extremities: Extremities normal, atraumatic, no cyanosis or edema. Assessment/Plan:       ICD-10-CM ICD-9-CM    1. Influenza B J10.1 487.1 Write note for patient to return to work. Flu symptoms have resolved other than fatigue   2. Essential hypertension I10 401.9 lisinopril-hydroCHLOROthiazide (PRINZIDE, ZESTORETIC) 10-12.5 mg per tablet          Advised her to call back or return to office if symptoms worsen/change/persist.  Discussed expected course/resolution/complications of diagnosis in detail with patient. Medication risks/benefits/costs/interactions/alternatives discussed with patient. She was given an after visit summary which includes diagnoses, current medications, & vitals. She expressed understanding with the diagnosis and plan.

## 2020-01-13 NOTE — PROGRESS NOTES
Chief Complaint   Patient presents with    Letter for School/Work    Sleep Problem     Pt presents for follow up on dx: Influenza. Pt states symptoms has improved, but is experiencing trouble sleeping. 1. Have you been to the ER, urgent care clinic since your last visit? Hospitalized since your last visit? No    2. Have you seen or consulted any other health care providers outside of the [address] [place] [address] since your last visit? Include any pap smears or colon screening.  No

## 2020-01-15 ENCOUNTER — OFFICE VISIT (OUTPATIENT)
Dept: OBGYN CLINIC | Age: 25
End: 2020-01-15

## 2020-01-15 VITALS
HEIGHT: 67 IN | WEIGHT: 234 LBS | BODY MASS INDEX: 36.73 KG/M2 | SYSTOLIC BLOOD PRESSURE: 124 MMHG | DIASTOLIC BLOOD PRESSURE: 86 MMHG

## 2020-01-15 DIAGNOSIS — N92.6 MISSED MENSES: Primary | ICD-10-CM

## 2020-01-15 LAB
HCG URINE, QL. (POC): POSITIVE
VALID INTERNAL CONTROL?: YES

## 2020-01-15 NOTE — PROGRESS NOTES
Current pregnancy history:    Sisi Phoenix is a  27 y.o. female [address] LMP approximately 19. (Menstrual status: Polycystic Ovarian Syndrome). .  She presents for the evaluation of amenorrhea and a positive pregnancy test.    LMP history:  The date of her LMP is uncertain, approximately 19. Her last menstrual period was normal.  A urine pregnancy test was positive. Based on her LMP, her EDC is 20 and her EGA is 6 weeks and 3 days. Her menstrual cycles are irregular due to PCOS. Pregnancy symptoms:    Since her LMP she has experienced  urinary frequency, breast tenderness, and nausea. She has been vomiting over the last few weeks. Associated signs and symptoms which she denies: dysuria, discharge, vaginal bleeding. Relevant past pregnancy history:   She has the following pregnancy history: Her last pregnancy was complicated with GHTN but delivery at 41 weeks    She has no history of  delivery. Relevant past medical history:(relevant to this pregnancy): one kidney (congenital)  HTN follows pcp on meds     Pap/Occupational history:  Last pap smear: last year Results: ASCUS HPV Neg          Substance history: negative for alcohol, tobacco and street drugs. Positive for nothing. Exposure history: There is/are no indoor cat/s in the home. The patient was instructed to not change the cat litter. She admits close contact with children on a regular basis. She has had chicken pox or the vaccine in the past.   Patient denies issues with domestic violence. Genetic Screening/Teratology Counseling: (Includes patient, baby's father, or anyone in either family with:)  3.  Patient's age >/= 28 at EDC?--no  2.   Thalassemia ([facility], [facility], [address], or  background): MCV<80?--no.     3.  Neural tube defect (meningomyelocele, spina bifida, anencephaly)?--no.   4.  Congenital heart defect?--no.  5.  Down syndrome?--no.   6.  Gilbert-Sachs (Religious, [place])?--no.   7.  Canavan's Disease?--no.   8.  Familial Dysautonomia?--no.   9.  Sickle cell disease or trait ()? --no   The patient has not been tested for sickle trait  10. Hemophilia or other blood disorders?--no. 11.  Muscular dystrophy?--no. 12.  Cystic fibrosis?--no. 13.  Columbus's Chorea?--no. 14.  Mental retardation/autism (if yes was person tested for Fragile X)?--no. 15.  Other inherited genetic or chromosomal disorder?--no. 12.  Maternal metabolic disorder (DM, PKU, etc)?--no. 17.  Patient or FOB with a child with a birth defect not listed above?--no.  17a. Patient or FOB with a birth defect themselves?--no. 18.  Recurrent pregnancy loss, or stillbirth?--no. 19.  Any medications since LMP other than prenatal vitamins (include vitamins, supplements, OTC meds, drugs, alcohol)?--no. 20.  Any other genetic/environmental exposure to discuss?--no. Infection History:  1. Lives with someone with TB or TB exposed?--no.   2.  Patient or partner has history of genital herpes?--no.  3.  Rash or viral illness since LMP?--no.    4.  History of STD (GC, CT, HPV, syphilis, HIV)? --no   5. Other: OTHER?       Past Medical History:   Diagnosis Date    Congenital absence of one kidney     Hidradenitis suppurativa     Hypertension     Obesity      Past Surgical History:   Procedure Laterality Date    HX ADENOIDECTOMY      HX TONSILLECTOMY       Social History     Occupational History    Not on file   Tobacco Use    Smoking status: Never Smoker    Smokeless tobacco: Never Used   Substance and Sexual Activity    Alcohol use: No    Drug use: No    Sexual activity: Yes     Partners: Male     Family History   Problem Relation Age of Onset    Hypertension Mother     Lupus Mother     Diabetes Father     Diabetes Maternal Grandmother     Hypertension Maternal Grandmother     Diabetes Paternal Grandmother      OB History    Para Term  AB Living   1 1 1 0 0 1   SAB TAB Ectopic Molar Multiple Live Births   0 0 0 0 0 1      # Outcome Date GA Lbr Logan/2nd Weight Sex Delivery Anes PTL Lv   1 Term 10/22/14 41w0d   F Vag-Spont  N JHONATAN     No Known Allergies  Prior to Admission medications    Medication Sig Start Date End Date Taking? Authorizing Provider   lisinopril-hydroCHLOROthiazide (PRINZIDE, ZESTORETIC) 10-12.5 mg per tablet Take 1 Tab by mouth daily. 1/13/20   Axel Ramon MD   doxycycline (VIBRAMYCIN) 100 mg capsule  1/4/20   Provider, Dolores   hydrocortisone (HYTONE) 2.5 % topical cream APPLY A THIN LAYER TO AFFECTED AREA TO AFFECTED AREA TWICE DAILY 6/26/19   Axel Ramon MD   ketoconazole (NIZORAL) 2 % topical cream Apply  to affected area daily.  6/21/19   Axel Ramon MD        Review of Systems: History obtained from the patient  Constitutional: negative for weight loss, fever, night sweats  HEENT: negative for hearing loss, earache, congestion, snoring, sore throat  CV: negative for chest pain, palpitations, edema  Resp: negative for cough, shortness of breath, wheezing  Breast: negative for breast lumps, nipple discharge, galactorrhea  GI: negative for change in bowel habits, abdominal pain, black or bloody stools  : negative for frequency, dysuria, hematuria, vaginal discharge  MSK: negative for back pain, joint pain, muscle pain  Skin: negative for itching, rash, hives  Neuro: negative for dizziness, headache, confusion, weakness  Psych: negative for anxiety, depression, change in mood  Heme/lymph: negative for bleeding, bruising, pallor    Objective:  Visit Vitals  /86 (BP 1 Location: Left arm, BP Patient Position: Sitting)   Ht 5' 7\" (1.702 m)   Wt 234 lb (106.1 kg)   LMP 12/01/2019 (Approximate)   BMI 36.65 kg/m²     Physical Exam:     Constitutional  · Appearance: well-nourished, well developed, alert, in no acute distress    HENT  · Head  · Face: appears normal  · Eyes: appear normal  · Ears: normal  · Mouth: normal  · Lips: no lesions      Chest  · Respiratory Effort: breathing unlabored     Cardiovascular  · Heart:  · Auscultation: regular rate and rhythm without murmur      Gastrointestinal  · Abdominal Examination: abdomen non-tender to palpation, normal bowel sounds, no masses present  · Liver and spleen: no hepatomegaly present, spleen not palpable  · Hernias: no hernias identified    Genitourinary  · deferred    Skin  · General Inspection: no rash, no lesions identified    Neurologic/Psychiatric  · Mental Status:  · Orientation: grossly oriented to person, place and time  · Mood and Affect: mood normal, affect appropriate    Assessment:   Presumptive IUP with sono in next 2 weeks  Plan:  rto 2 weeks for viability scan  Stop lisinopril and contact pcp     Total visit time approximately 15 minutes will all in face to face conversation with education and history taking

## 2020-01-15 NOTE — PATIENT INSTRUCTIONS
Pregnancy Precautions: Care Instructions  Your Care Instructions    There is no sure way to prevent labor before your due date ( labor) or to prevent most other pregnancy problems. But there are things you can do to increase your chances of a healthy pregnancy. Go to your appointments, follow your doctor's advice, and take good care of yourself. Eat well, and exercise (if your doctor agrees). And make sure to drink plenty of water. Follow-up care is a key part of your treatment and safety. Be sure to make and go to all appointments, and call your doctor if you are having problems. It's also a good idea to know your test results and keep a list of the medicines you take. How can you care for yourself at home? · Make sure you go to your prenatal appointments. At each visit, your doctor will check your blood pressure. Your doctor will also check to see if you have protein in your urine. High blood pressure and protein in urine are signs of preeclampsia. This condition can be dangerous for you and your baby. · Drink plenty of fluids, enough so that your urine is light yellow or clear like water. Dehydration can cause contractions. If you have kidney, heart, or liver disease and have to limit fluids, talk with your doctor before you increase the amount of fluids you drink. · Tell your doctor right away if you notice any symptoms of an infection, such as:  ? Burning when you urinate. ? A foul-smelling discharge from your vagina. ? Vaginal itching. ? Unexplained fever. ? Unusual pain or soreness in your uterus or lower belly. · Eat a balanced diet. Include plenty of foods that are high in calcium and iron. ? Foods high in calcium include milk, cheese, yogurt, almonds, and broccoli. ? Foods high in iron include red meat, shellfish, poultry, eggs, beans, raisins, whole-grain bread, and leafy green vegetables. · Do not smoke.  If you need help quitting, talk to your doctor about stop-smoking programs and medicines. These can increase your chances of quitting for good. · Do not drink alcohol or use illegal drugs. · Follow your doctor's directions about activity. Your doctor will let you know how much, if any, exercise you can do. · Ask your doctor if you can have sex. If you are at risk for early labor, your doctor may ask you to not have sex. · Take care to prevent falls. During pregnancy, your joints are loose, and your balance is off. Sports such as bicycling, skiing, or in-line skating can increase your risk of falling. And don't ride horses or motorcycles, dive, water ski, scuba dive, or parachute jump while you are pregnant. · Avoid getting very hot. Do not use saunas or hot tubs. Avoid staying out in the sun in hot weather for long periods. Take acetaminophen (Tylenol) to lower a high fever. · Do not take any over-the-counter or herbal medicines or supplements without talking to your doctor or pharmacist first.  When should you call for help? Call 911 anytime you think you may need emergency care. For example, call if:    · You passed out (lost consciousness).     · You have a seizure.     · You have severe vaginal bleeding.     · You have severe pain in your belly or pelvis.     · You have had fluid gushing or leaking from your vagina and you know or think the umbilical cord is bulging into your vagina. If this happens, immediately get down on your knees so your rear end (buttocks) is higher than your head. This will decrease the pressure on the cord until help arrives.   [facility] your doctor now or seek immediate medical care if:    · You have signs of preeclampsia, such as:  ? Sudden swelling of your face, hands, or feet. ? New vision problems (such as dimness, blurring, or seeing spots). ? A severe headache.     · You have any vaginal bleeding.     · You have belly pain or cramping.     · You have a fever.     · You have had regular contractions (with or without pain) for an hour.  This means that you have 8 or more within 1 hour or 4 or more in 20 minutes after you change your position and drink fluids.     · You have a sudden release of fluid from your vagina.     · You have low back pain or pelvic pressure that does not go away.     · You notice that your baby has stopped moving or is moving much less than normal.    Watch closely for changes in your health, and be sure to contact your doctor if you have any problems. Where can you learn more? Go to http://vaughn-shemar.info/. Enter [id number] in the search box to learn more about \"Pregnancy Precautions: Care Instructions. \"  Current as of: May 29, 2019  Content Version: 12.2  © 3494-8273 [employer], [employer]. Care instructions adapted under license by [employer] (which disclaims liability or warranty for this information). If you have questions about a medical condition or this instruction, always ask your healthcare professional. [address] any warranty or liability for your use of this information.

## 2020-01-27 ENCOUNTER — OFFICE VISIT (OUTPATIENT)
Dept: OBGYN CLINIC | Age: 25
End: 2020-01-27

## 2020-01-27 VITALS
SYSTOLIC BLOOD PRESSURE: 128 MMHG | BODY MASS INDEX: 37.2 KG/M2 | HEIGHT: 67 IN | DIASTOLIC BLOOD PRESSURE: 86 MMHG | WEIGHT: 237 LBS

## 2020-01-27 DIAGNOSIS — Z3A.08 8 WEEKS GESTATION OF PREGNANCY: Primary | ICD-10-CM
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
## 2020-01-27 NOTE — PATIENT INSTRUCTIONS
Pregnancy Precautions: Care Instructions  Your Care Instructions    There is no sure way to prevent labor before your due date ( labor) or to prevent most other pregnancy problems. But there are things you can do to increase your chances of a healthy pregnancy. Go to your appointments, follow your doctor's advice, and take good care of yourself. Eat well, and exercise (if your doctor agrees). And make sure to drink plenty of water. Follow-up care is a key part of your treatment and safety. Be sure to make and go to all appointments, and call your doctor if you are having problems. It's also a good idea to know your test results and keep a list of the medicines you take. How can you care for yourself at home? · Make sure you go to your prenatal appointments. At each visit, your doctor will check your blood pressure. Your doctor will also check to see if you have protein in your urine. High blood pressure and protein in urine are signs of preeclampsia. This condition can be dangerous for you and your baby. · Drink plenty of fluids, enough so that your urine is light yellow or clear like water. Dehydration can cause contractions. If you have kidney, heart, or liver disease and have to limit fluids, talk with your doctor before you increase the amount of fluids you drink. · Tell your doctor right away if you notice any symptoms of an infection, such as:  ? Burning when you urinate. ? A foul-smelling discharge from your vagina. ? Vaginal itching. ? Unexplained fever. ? Unusual pain or soreness in your uterus or lower belly. · Eat a balanced diet. Include plenty of foods that are high in calcium and iron. ? Foods high in calcium include milk, cheese, yogurt, almonds, and broccoli. ? Foods high in iron include red meat, shellfish, poultry, eggs, beans, raisins, whole-grain bread, and leafy green vegetables. · Do not smoke.  If you need help quitting, talk to your doctor about stop-smoking programs and medicines. These can increase your chances of quitting for good. · Do not drink alcohol or use illegal drugs. · Follow your doctor's directions about activity. Your doctor will let you know how much, if any, exercise you can do. · Ask your doctor if you can have sex. If you are at risk for early labor, your doctor may ask you to not have sex. · Take care to prevent falls. During pregnancy, your joints are loose, and your balance is off. Sports such as bicycling, skiing, or in-line skating can increase your risk of falling. And don't ride horses or motorcycles, dive, water ski, scuba dive, or parachute jump while you are pregnant. · Avoid getting very hot. Do not use saunas or hot tubs. Avoid staying out in the sun in hot weather for long periods. Take acetaminophen (Tylenol) to lower a high fever. · Do not take any over-the-counter or herbal medicines or supplements without talking to your doctor or pharmacist first.  When should you call for help? Call 911 anytime you think you may need emergency care. For example, call if:    · You passed out (lost consciousness).     · You have a seizure.     · You have severe vaginal bleeding.     · You have severe pain in your belly or pelvis.     · You have had fluid gushing or leaking from your vagina and you know or think the umbilical cord is bulging into your vagina. If this happens, immediately get down on your knees so your rear end (buttocks) is higher than your head. This will decrease the pressure on the cord until help arrives.   [facility] your doctor now or seek immediate medical care if:    · You have signs of preeclampsia, such as:  ? Sudden swelling of your face, hands, or feet. ? New vision problems (such as dimness, blurring, or seeing spots). ? A severe headache.     · You have any vaginal bleeding.     · You have belly pain or cramping.     · You have a fever.     · You have had regular contractions (with or without pain) for an hour.  This means that you have 8 or more within 1 hour or 4 or more in 20 minutes after you change your position and drink fluids.     · You have a sudden release of fluid from your vagina.     · You have low back pain or pelvic pressure that does not go away.     · You notice that your baby has stopped moving or is moving much less than normal.    Watch closely for changes in your health, and be sure to contact your doctor if you have any problems. Where can you learn more? Go to http://vaughn-shemar.info/. Enter [id number] in the search box to learn more about \"Pregnancy Precautions: Care Instructions. \"  Current as of: May 29, 2019  Content Version: 12.2  © 1200-9686 [employer], [employer]. Care instructions adapted under license by [employer] (which disclaims liability or warranty for this information). If you have questions about a medical condition or this instruction, always ask your healthcare professional. [address] any warranty or liability for your use of this information.

## 2020-02-17 ENCOUNTER — ROUTINE PRENATAL (OUTPATIENT)
Dept: OBGYN CLINIC | Age: 25
End: 2020-02-17

## 2020-02-17 VITALS — WEIGHT: 135 LBS | DIASTOLIC BLOOD PRESSURE: 80 MMHG | BODY MASS INDEX: 21.14 KG/M2 | SYSTOLIC BLOOD PRESSURE: 124 MMHG

## 2020-02-17 DIAGNOSIS — Z11.3 SCREENING EXAMINATION FOR VENEREAL DISEASE: ICD-10-CM

## 2020-02-17 DIAGNOSIS — Z36.9 UNSPECIFIED ANTENATAL SCREENING: Primary | ICD-10-CM

## 2020-02-17 DIAGNOSIS — Z34.81 PRENATAL CARE, SUBSEQUENT PREGNANCY IN FIRST TRIMESTER: ICD-10-CM

## 2020-02-17 DIAGNOSIS — Z3A.11 11 WEEKS GESTATION OF PREGNANCY: ICD-10-CM

## 2020-02-17 LAB
ANTIBODY SCREEN, EXTERNAL: NEGATIVE
CHLAMYDIA, EXTERNAL: NEGATIVE
HBSAG, EXTERNAL: NEGATIVE
HCT, EXTERNAL: 35.4
HGB EVAL, EXTERNAL: NORMAL
HGB, EXTERNAL: 11.9
HIV, EXTERNAL: NON REACTIVE
N. GONORRHEA, EXTERNAL: NEGATIVE
PLATELET CNT,   EXTERNAL: 237
RUBELLA, EXTERNAL: NORMAL
T. PALLIDUM, EXTERNAL: NON REACTIVE
TYPE, ABO & RH, EXTERNAL: NORMAL
URINALYSIS, EXTERNAL: NORMAL
VARICELLA, EXTERNAL: NORMAL

## 2020-02-17 NOTE — PROGRESS NOTES
New OB labs and desires panorama.    Still tired, but fatigue resolving  Enc Baby ASA starting ~ 16 wks

## 2020-02-17 NOTE — PATIENT INSTRUCTIONS
Weeks 10 to 14 of Your Pregnancy: Care Instructions  Your Care Instructions    By weeks 10 to 14 of your pregnancy, the placenta has formed inside your uterus. It is possible to hear your baby's heartbeat with a special ultrasound device. Your baby's eyes can and do move. The arms and legs can bend. This is a good time to think about testing for birth defects. There are two types of tests: screening and diagnostic. Screening tests show the chance that a baby has a certain birth defect. They can't tell you for sure that your baby has a problem. Diagnostic tests show if a baby has a certain birth defect. It's your choice whether to have these tests. You and your partner can talk to your doctor or midwife about birth defects tests. Follow-up care is a key part of your treatment and safety. Be sure to make and go to all appointments, and call your doctor if you are having problems. It's also a good idea to know your test results and keep a list of the medicines you take. How can you care for yourself at home? Decide about tests  · You can have screening tests and diagnostic tests to check for birth defects. The decision to have a test for birth defects is personal. Think about your age, your chance of passing on a family disease, your need to know about any problems, and what you might do after you have the test results. ? Triple or quadruple (quad) blood tests. These screening tests can be done between 15 and 20 weeks of pregnancy. They check the amounts of three or four substances in your blood. The doctor looks at these test results, along with your age and other factors, to find out the chance that your baby may have certain problems. ? Amniocentesis. This diagnostic test is used to look for chromosomal problems in the baby's cells.  It can be done between 15 and 20 weeks of pregnancy, usually around week 16.  ? Nuchal translucency test. This test uses ultrasound to measure the thickness of the area at the back of the baby's neck. An increase in the thickness can be an early sign of Down syndrome. ? Chorionic villus sampling (CVS). This is a test that looks for certain genetic problems with your baby. The same genes that are in your baby are in the placenta. A small piece of the placenta is taken out and tested. This test is done when you are 10 to 13 weeks pregnant. Ease discomfort  · Slow down and take naps when you feel tired. · If your emotions swing, talk to someone. Crying, anxiety, and concentration problems are common. · If your gums bleed, try a softer toothbrush. If your gums are puffy and bleed a lot, see your dentist.  · If you feel dizzy:  ? Get up slowly after sitting or lying down. ? Drink plenty of fluids. ? Eat small snacks to keep your blood sugar stable. ? Put your head between your legs as though you were tying your shoelaces. ? Lie down with your legs higher than your head. Use pillows to prop up your feet. · If you have a headache:  ? Lie down. ? Ask your partner or a good friend for a neck massage. ? Try cool cloths over your forehead or across the back of your neck. ? Use acetaminophen (Tylenol) for pain relief. Do not use nonsteroidal anti-inflammatory drugs (NSAIDs), such as ibuprofen (Advil, Motrin) or naproxen (Aleve), unless your doctor says it is okay. · If you have a nosebleed, pinch your nose gently, and hold it for a short while. To prevent nosebleeds, try massaging a small dab of petroleum jelly, such as Vaseline, in your nostrils. · If your nose is stuffed up, try saline (saltwater) nose sprays. Do not use decongestant sprays. Care for your breasts  · Wear a bra that gives you good support. · Know that changes in your breasts are normal.  ? Your breasts may get larger and more tender. Tenderness usually gets better by 12 weeks. ? Your nipples may get darker and larger, and small bumps around your nipples may show more. ?  The veins in your chest and breasts may show more. · Don't worry about \"toughening'\" your nipples. Breastfeeding will naturally do this. Where can you learn more? Go to http://vaughn-shemar.info/. Enter E267 in the search box to learn more about \"Weeks 10 to 14 of Your Pregnancy: Care Instructions. \"  Current as of: May 29, 2019  Content Version: 12.2  © 8211-6949 [employer]. Care instructions adapted under license by [employer] (which disclaims liability or warranty for this information). If you have questions about a medical condition or this instruction, always ask your healthcare professional. [address] any warranty or liability for your use of this information.

## 2020-02-19 LAB
ABO GROUP BLD: NORMAL
BACTERIA UR CULT: NORMAL
BLD GP AB SCN SERPL QL: NEGATIVE
C TRACH RRNA SPEC QL NAA+PROBE: NEGATIVE
ERYTHROCYTE [DISTWIDTH] IN BLOOD BY AUTOMATED COUNT: 13.5 % (ref 11.7–15.4)
HBV SURFACE AG SERPL QL IA: NEGATIVE
HCT VFR BLD AUTO: 35.4 % (ref 34–46.6)
HGB A MFR BLD: 97.7 % (ref 96.4–98.8)
HGB A2 MFR BLD COLUMN CHROM: 2.3 % (ref 1.8–3.2)
HGB BLD-MCNC: 11.9 G/DL (ref 11.1–15.9)
HGB C MFR BLD: 0 %
HGB F MFR BLD: 0 % (ref 0–2)
HGB FRACT BLD-IMP: NORMAL
HGB OTHER MFR BLD HPLC: 0 %
HGB S BLD QL SOLY: NEGATIVE
HGB S MFR BLD: 0 %
HIV 1+2 AB+HIV1 P24 AG SERPL QL IA: NON REACTIVE
MCH RBC QN AUTO: 27.7 PG (ref 26.6–33)
MCHC RBC AUTO-ENTMCNC: 33.6 G/DL (ref 31.5–35.7)
MCV RBC AUTO: 82 FL (ref 79–97)
N GONORRHOEA RRNA SPEC QL NAA+PROBE: NEGATIVE
PLATELET # BLD AUTO: 237 X10E3/UL (ref 150–450)
RBC # BLD AUTO: 4.3 X10E6/UL (ref 3.77–5.28)
RH BLD: POSITIVE
RUBV IGG SERPL IA-ACNC: <0.9 INDEX
T VAGINALIS DNA SPEC QL NAA+PROBE: NEGATIVE
TREPONEMA PALLIDUM IGG+IGM AB [PRESENCE] IN SERUM OR PLASMA BY IMMUNOASSAY: NON REACTIVE
VZV IGG SER IA-ACNC: 468 INDEX
WBC # BLD AUTO: 8.4 X10E3/UL (ref 3.4–10.8)

## 2020-02-20 NOTE — PROGRESS NOTES
NOB labs. Rubella Non-Immune. Pending Panorama results. Please add to prenatal record.  Thanks, Baylee Harris

## 2020-02-26 DIAGNOSIS — Z3A.11 11 WEEKS GESTATION OF PREGNANCY: ICD-10-CM

## 2020-03-03 ENCOUNTER — TELEPHONE (OUTPATIENT)
Dept: OBGYN CLINIC | Age: 25
End: 2020-03-03

## 2020-03-03 NOTE — TELEPHONE ENCOUNTER
Patient is calling for panorama test results. Not signed off by provider yet. She sees Anika Dutta. Patient wants to know gender and genetics    May I report, low risk male gender?

## 2020-03-16 ENCOUNTER — ROUTINE PRENATAL (OUTPATIENT)
Dept: OBGYN CLINIC | Age: 25
End: 2020-03-16

## 2020-03-16 VITALS — DIASTOLIC BLOOD PRESSURE: 84 MMHG | WEIGHT: 235 LBS | SYSTOLIC BLOOD PRESSURE: 124 MMHG | BODY MASS INDEX: 36.81 KG/M2

## 2020-03-16 DIAGNOSIS — Z34.82 PRENATAL CARE, SUBSEQUENT PREGNANCY IN SECOND TRIMESTER: Primary | ICD-10-CM

## 2020-03-16 NOTE — PATIENT INSTRUCTIONS

## 2020-04-16 ENCOUNTER — ROUTINE PRENATAL (OUTPATIENT)
Dept: OBGYN CLINIC | Age: 25
End: 2020-04-16

## 2020-04-16 DIAGNOSIS — Z3A.19 19 WEEKS GESTATION OF PREGNANCY: Primary | ICD-10-CM

## 2020-04-16 NOTE — PATIENT INSTRUCTIONS

## 2020-05-14 ENCOUNTER — ROUTINE PRENATAL (OUTPATIENT)
Dept: OBGYN CLINIC | Age: 25
End: 2020-05-14

## 2020-05-14 VITALS — DIASTOLIC BLOOD PRESSURE: 78 MMHG | SYSTOLIC BLOOD PRESSURE: 110 MMHG | WEIGHT: 240 LBS | BODY MASS INDEX: 37.59 KG/M2

## 2020-05-14 DIAGNOSIS — Z34.82 PRENATAL CARE, SUBSEQUENT PREGNANCY IN SECOND TRIMESTER: Primary | ICD-10-CM

## 2020-05-14 DIAGNOSIS — I10 HYPERTENSION, UNSPECIFIED TYPE: ICD-10-CM

## 2020-05-14 DIAGNOSIS — N28.9 KIDNEY DYSFUNCTION: Primary | ICD-10-CM

## 2020-05-14 NOTE — PATIENT INSTRUCTIONS
Weeks 22 to 26 of Your Pregnancy: Care Instructions  Your Care Instructions    As you enter your 7th month of pregnancy at week 26, your baby's lungs are growing stronger and getting ready to breathe. You may notice that your baby responds to the sound of your or your partner's voice. You may also notice that your baby does less turning and twisting and more squirming or jerking. Jerking often means that your baby has the hiccups. Hiccups are perfectly normal and are only temporary. You may want to think about attending a childbirth preparation class. This is also a good time to start thinking about whether you want to have pain medicine during labor. Most pregnant women are tested for gestational diabetes between weeks 25 and 28. Gestational diabetes occurs when your blood sugar level gets too high when you're pregnant. The test is important, because you can have gestational diabetes and not know it. But the condition can cause problems for your baby. Follow-up care is a key part of your treatment and safety. Be sure to make and go to all appointments, and call your doctor if you are having problems. It's also a good idea to know your test results and keep a list of the medicines you take. How can you care for yourself at home? Ease discomfort from your baby's kicking  · Change your position. Sometimes this will cause your baby to change position too. · Take a deep breath while you raise your arm over your head. Then breathe out while you drop your arm. Do Kegel exercises to prevent urine from leaking  · You can do Kegel exercises while you stand or sit. ? Squeeze the same muscles you would use to stop your urine. Your belly and thighs should not move. ? Hold the squeeze for 3 seconds, and then relax for 3 seconds. ? Start with 3 seconds. Then add 1 second each week until you are able to squeeze for 10 seconds. ? Repeat the exercise 10 to 15 times for each session.  Do three or more sessions each day.  Ease or reduce swelling in your feet, ankles, hands, and fingers  · If your fingers are puffy, take off your rings. · Do not eat high-salt foods, such as potato chips. · Prop up your feet on a stool or couch as much as possible. Sleep with pillows under your feet. · Do not stand for long periods of time or wear tight shoes. · Wear support stockings. Where can you learn more? Go to http://vaughn-shemar.info/  Enter G264 in the search box to learn more about \"Weeks 22 to 26 of Your Pregnancy: Care Instructions. \"  Current as of: May 29, 2019Content Version: 12.4  © 8807-2814 Healthwise, Incorporated. Care instructions adapted under license by [employer] (which disclaims liability or warranty for this information). If you have questions about a medical condition or this instruction, always ask your healthcare professional. [address] any warranty or liability for your use of this information.

## 2020-05-14 NOTE — PROGRESS NOTES
Patient overall is doing well, although she complaints of headaches over past 6 weeks. She's inquiring about her kidney function and urine output (today it was dark in color).   Has not started taking Baby ASA yet, states was getting conflicting info from providers, strongly encouraged to start  Plan to start 24 hr Urine collection for baseline and referral to Nephrology  [facility]  Denies PTL s/sx  Had PTL \"scare\" @ 30 wks with daughter but was induced @ 40 wks  GDS next visit

## 2020-09-16 ENCOUNTER — VIRTUAL VISIT (OUTPATIENT)
Dept: INTERNAL MEDICINE CLINIC | Age: 25
End: 2020-09-16
Payer: COMMERCIAL

## 2020-09-16 DIAGNOSIS — L02.92 BOIL: Primary | ICD-10-CM

## 2020-09-16 PROCEDURE — 99213 OFFICE O/P EST LOW 20 MIN: CPT | Performed by: INTERNAL MEDICINE

## 2020-09-16 RX ORDER — CEPHALEXIN 500 MG/1
500 CAPSULE ORAL 4 TIMES DAILY
Qty: 40 CAP | Refills: 0 | Status: SHIPPED | OUTPATIENT
Start: 2020-09-16 | End: 2020-09-26

## 2020-09-16 NOTE — PROGRESS NOTES
Alfa Hudson is a 23 y.o. female who was seen by synchronous (real-time) audio-video technology on 9/16/2020 for Skin Problem        Assessment & Plan:   Diagnoses and all orders for this visit:    1. Boil    Other orders  -     cephALEXin (KEFLEX) 500 mg capsule; Take 1 Cap by mouth four (4) times daily for 10 days. hot compresses  Since she is still breastfeeding she will check with her son's pediatrician        Subjective:     Patient is in today for follow up. She just had her second baby and is breastfeeding. Baby is 3weeks old. She recently developed a boil the size of quarter. Used tea tree oil. Boil opened. She noticed a lot of yellow pus draining out and has not been able to express any more. She says she has a hx of hydratenitis. She says it was warm and red. She says the red area is expanding. Prior to Admission medications    Medication Sig Start Date End Date Taking? Authorizing Provider   cholecalciferol, vitamin D3, (VITAMIN D3 PO) Take 5,000 Units by mouth. Yes Provider, Historical   PNV no.153/FA/om3/dha/epa/fish (PRENATAL GUMMIES PO) Take  by mouth. Yes Provider, Historical   labetaloL (NORMODYNE) 100 mg tablet Take 1 Tab by mouth two (2) times a day. Patient taking differently: Take 200 mg by mouth two (2) times a day. 1/20/20  Yes Linda Beckford MD   methyldopa (ALDOMET) 250 mg tablet Take 1 Tab by mouth three (3) times daily. 1/15/20   Linda Beckford MD   doxycycline (VIBRAMYCIN) 100 mg capsule  1/4/20   Provider, Historical   hydrocortisone (HYTONE) 2.5 % topical cream APPLY A THIN LAYER TO AFFECTED AREA TO AFFECTED AREA TWICE DAILY 6/26/19   Linda Beckford MD   ketoconazole (NIZORAL) 2 % topical cream Apply  to affected area daily. 6/21/19   Linda Beckford MD         Review of Systems   Constitutional: Negative for weight loss. Eyes: Negative for blurred vision. Respiratory: Negative for shortness of breath. Cardiovascular: Negative for chest pain and leg swelling. Genitourinary: Negative for frequency and urgency. Musculoskeletal: Negative for joint pain. Neurological: Negative for headaches. Objective:     Patient-Reported Vitals 9/16/2020   Patient-Reported Weight 220   Patient-Reported Height 57        [INSTRUCTIONS:  \"[x]\" Indicates a positive item  \"[]\" Indicates a negative item  -- DELETE ALL ITEMS NOT EXAMINED]    Constitutional: [x] Appears well-developed and well-nourished [x] No apparent distress      [] Abnormal -     Mental status: [x] Alert and awake  [x] Oriented to person/place/time [x] Able to follow commands    [] Abnormal -     Eyes:   EOM    [x]  Normal    [] Abnormal -   Sclera  [x]  Normal    [] Abnormal -          Discharge [x]  None visible   [] Abnormal -     HENT: [x] Normocephalic, atraumatic  [] Abnormal -   [x] Mouth/Throat: Mucous membranes are moist    External Ears [x] Normal  [] Abnormal -    Neck: [x] No visualized mass [] Abnormal -     Pulmonary/Chest: [x] Respiratory effort normal   [x] No visualized signs of difficulty breathing or respiratory distress        [] Abnormal -      Musculoskeletal:   [x] Normal gait with no signs of ataxia         [x] Normal range of motion of neck        [] Abnormal -     Neurological:        [x] No Facial Asymmetry (Cranial nerve 7 motor function) (limited exam due to video visit)          [x] No gaze palsy        [] Abnormal -          Skin:        [x] No significant exanthematous lesions or discoloration noted on facial skin         [] Abnormal -            Psychiatric:       [x] Normal Affect [] Abnormal -        [x] No Hallucinations    Other pertinent observable physical exam findings:-        We discussed the expected course, resolution and complications of the diagnosis(es) in detail. Medication risks, benefits, costs, interactions, and alternatives were discussed as indicated. I advised her to contact the office if her condition worsens, changes or fails to improve as anticipated.  She expressed understanding with the diagnosis(es) and plan. Jennie Bond, who was evaluated through a patient-initiated, synchronous (real-time) audio-video encounter, and/or her healthcare decision maker, is aware that it is a billable service, with coverage as determined by her insurance carrier. She provided verbal consent to proceed: Yes, and patient identification was verified. It was conducted pursuant to the emergency declaration under the [address] [place] [address] and the [employer] General Act. A caregiver was present when appropriate. Ability to conduct physical exam was limited. I was at home. The patient was at home.       Jesús Batista MD

## 2020-09-16 NOTE — PROGRESS NOTES
Chief Complaint   Patient presents with    Skin Problem     Pt c/o drained boil under left breast x 4 days. 1. Have you been to the ER, urgent care clinic since your last visit? Hospitalized since your last visit? Yes,     2. Have you seen or consulted any other health care providers outside of the [address] [place] [address] since your last visit? Include any pap smears or colon screening.  Yes, seen at [facility] 8/16 and 8/20 @ [facility]

## 2020-10-30 ENCOUNTER — VIRTUAL VISIT (OUTPATIENT)
Dept: INTERNAL MEDICINE CLINIC | Age: 25
End: 2020-10-30
Payer: COMMERCIAL

## 2020-10-30 DIAGNOSIS — L73.2 HIDRADENITIS SUPPURATIVA: ICD-10-CM

## 2020-10-30 DIAGNOSIS — L02.92 BOILS: Primary | ICD-10-CM

## 2020-10-30 PROCEDURE — 99213 OFFICE O/P EST LOW 20 MIN: CPT | Performed by: INTERNAL MEDICINE

## 2020-10-30 RX ORDER — SULFAMETHOXAZOLE AND TRIMETHOPRIM 800; 160 MG/1; MG/1
1 TABLET ORAL 2 TIMES DAILY
Qty: 20 TAB | Refills: 0 | Status: SHIPPED | OUTPATIENT
Start: 2020-10-30 | End: 2020-11-09

## 2020-10-30 NOTE — PROGRESS NOTES
Lawanda Brown is a 23 y.o. female who was seen by synchronous (real-time) audio-video technology on 10/30/2020 for No chief complaint on file. Assessment & Plan:   Diagnoses and all orders for this visit:    1. Boils  -     REFERRAL TO GENERAL SURGERY  -     trimethoprim-sulfamethoxazole (BACTRIM DS, SEPTRA DS) 160-800 mg per tablet; Take 1 Tab by mouth two (2) times a day for 10 days. 2. Hidradenitis suppurativa  -     REFERRAL TO GENERAL SURGERY  -     trimethoprim-sulfamethoxazole (BACTRIM DS, SEPTRA DS) 160-800 mg per tablet; Take 1 Tab by mouth two (2) times a day for 10 days. Subjective:     Patient has a recurrent  boil under her left breast. She took cephalexin back in September. She said there was some improvement. She now sees the issue recurring. She also has boils in her groin area. She has 3 in the groin area. She denies any fever or chill, no pus noted. she has seen derm in past and dx with hidradenitis suppuritiva. She is no longer breast feeding        Prior to Admission medications    Medication Sig Start Date End Date Taking? Authorizing Provider   cholecalciferol, vitamin D3, (VITAMIN D3 PO) Take 5,000 Units by mouth. Provider, Historical   PNV no.153/FA/om3/dha/epa/fish (PRENATAL GUMMIES PO) Take  by mouth. Provider, Historical   labetaloL (NORMODYNE) 100 mg tablet Take 1 Tab by mouth two (2) times a day. Patient taking differently: Take 200 mg by mouth two (2) times a day. 1/20/20   Suze Barrios MD   methyldopa (ALDOMET) 250 mg tablet Take 1 Tab by mouth three (3) times daily. 1/15/20   Suze Barrios MD   doxycycline (VIBRAMYCIN) 100 mg capsule  1/4/20   Provider, Historical   hydrocortisone (HYTONE) 2.5 % topical cream APPLY A THIN LAYER TO AFFECTED AREA TO AFFECTED AREA TWICE DAILY 6/26/19   Suze Barrios MD   ketoconazole (NIZORAL) 2 % topical cream Apply  to affected area daily.  6/21/19   Suze Barrios MD         Review of Systems   Constitutional: Negative for weight loss. Eyes: Negative for blurred vision. Respiratory: Negative for shortness of breath. Cardiovascular: Negative for chest pain and leg swelling. Genitourinary: Negative for frequency and urgency. Musculoskeletal: Negative for joint pain. Skin:        boils   Neurological: Negative for headaches.        Objective:     Patient-Reported Vitals 9/16/2020   Patient-Reported Weight 220   Patient-Reported Height 57        [INSTRUCTIONS:  \"[x]\" Indicates a positive item  \"[]\" Indicates a negative item  -- DELETE ALL ITEMS NOT EXAMINED]    Constitutional: [x] Appears well-developed and well-nourished [x] No apparent distress      [] Abnormal -     Mental status: [x] Alert and awake  [x] Oriented to person/place/time [x] Able to follow commands    [] Abnormal -     Eyes:   EOM    [x]  Normal    [] Abnormal -   Sclera  [x]  Normal    [] Abnormal -          Discharge [x]  None visible   [] Abnormal -     HENT: [x] Normocephalic, atraumatic  [] Abnormal -   [x] Mouth/Throat: Mucous membranes are moist    External Ears [x] Normal  [] Abnormal -    Neck: [x] No visualized mass [] Abnormal -     Pulmonary/Chest: [x] Respiratory effort normal   [x] No visualized signs of difficulty breathing or respiratory distress        [] Abnormal -      Musculoskeletal:   [x] Normal gait with no signs of ataxia         [x] Normal range of motion of neck        [] Abnormal -     Neurological:        [x] No Facial Asymmetry (Cranial nerve 7 motor function) (limited exam due to video visit)          [x] No gaze palsy        [] Abnormal -          Skin:        [] No significant exanthematous lesions or discoloration noted on facial skin         [x] Abnormal -  Boil noticed under left breast, mild erythema, no drainage'  Smaller areas noted in groin           Psychiatric:       [x] Normal Affect [] Abnormal -        [x] No Hallucinations    Other pertinent observable physical exam findings:-        We discussed the expected course, resolution and complications of the diagnosis(es) in detail. Medication risks, benefits, costs, interactions, and alternatives were discussed as indicated. I advised her to contact the office if her condition worsens, changes or fails to improve as anticipated. She expressed understanding with the diagnosis(es) and plan. Alfonso Alexander, who was evaluated through a patient-initiated, synchronous (real-time) audio-video encounter, and/or her healthcare decision maker, is aware that it is a billable service, with coverage as determined by her insurance carrier. She provided verbal consent to proceed: Yes, and patient identification was verified. It was conducted pursuant to the emergency declaration under the [address] [place] [address] authority and the [employer] General Act. A caregiver was present when appropriate. Ability to conduct physical exam was limited. I was at home. The patient was at home.       Palak Lynne MD

## 2020-11-18 ENCOUNTER — OFFICE VISIT (OUTPATIENT)
Dept: SURGERY | Age: 25
End: 2020-11-18
Payer: COMMERCIAL

## 2020-11-18 VITALS
OXYGEN SATURATION: 98 % | RESPIRATION RATE: 18 BRPM | HEART RATE: 106 BPM | DIASTOLIC BLOOD PRESSURE: 90 MMHG | BODY MASS INDEX: 35.94 KG/M2 | SYSTOLIC BLOOD PRESSURE: 154 MMHG | HEIGHT: 67 IN | WEIGHT: 229 LBS | TEMPERATURE: 98.9 F

## 2020-11-18 DIAGNOSIS — L73.2 HIDRADENITIS: Primary | ICD-10-CM

## 2020-11-18 PROCEDURE — 99203 OFFICE O/P NEW LOW 30 MIN: CPT | Performed by: SURGERY

## 2020-11-18 RX ORDER — DOXYCYCLINE 100 MG/1
100 CAPSULE ORAL 2 TIMES DAILY
Qty: 20 CAP | Refills: 0 | Status: SHIPPED | OUTPATIENT
Start: 2020-11-18 | End: 2020-11-28

## 2020-11-18 NOTE — LETTER
11/18/20 Patient: Dion Grewal YOB: 1995 Date of Visit: 11/18/2020 Danette Cortez MD 
[address] [place] [address],[address] [place] [id number] VIA In Basket Dear Danette Cortez MD, Thank you for referring Ms. Dion Grewal to [facility] for evaluation. My notes for this consultation are attached. If you have questions, please do not hesitate to call me. I look forward to following your patient along with you. Sincerely, Anuj Ramey MD

## 2020-11-18 NOTE — PROGRESS NOTES
3 [facility] at [facility] Surgery History and Physical    History of Present Illness:      Destiney Crawford is a 23 y.o. female who who has hidradenitis of the bilateral groins and left breast area. She has the more severe hidradenitis in the right groin more so than the left groin. She also has 2 spots on the left breast which appear to pop up periodically. She does not currently have any draining abscesses or active infection. She has infections of this sometimes every few weeks to every few months. She has had multiple antibiotic treatments for this but none seem to completely take away the issue. She has never had surgery for this issue. Past Medical History:   Diagnosis Date    Congenital absence of one kidney     Hidradenitis suppurativa     Hypertension     Obesity        Past Surgical History:   Procedure Laterality Date    HX ADENOIDECTOMY      HX LIPOSUCTION  04/2019    HX OTHER SURGICAL  04/2019    butt lift    HX TONSILLECTOMY           Current Outpatient Medications:     doxycycline (VIBRAMYCIN) 100 mg capsule, Take 1 Cap by mouth two (2) times a day for 10 days. , Disp: 20 Cap, Rfl: 0    cholecalciferol, vitamin D3, (VITAMIN D3 PO), Take 5,000 Units by mouth., Disp: , Rfl:     labetaloL (NORMODYNE) 100 mg tablet, Take 1 Tab by mouth two (2) times a day. (Patient taking differently: Take 200 mg by mouth two (2) times a day.), Disp: 60 Tab, Rfl: 3    doxycycline (VIBRAMYCIN) 100 mg capsule, , Disp: , Rfl:     hydrocortisone (HYTONE) 2.5 % topical cream, APPLY A THIN LAYER TO AFFECTED AREA TO AFFECTED AREA TWICE DAILY, Disp: 30 g, Rfl: 1    ketoconazole (NIZORAL) 2 % topical cream, Apply  to affected area daily. , Disp: 30 g, Rfl: 0    PNV no.153/FA/om3/dha/epa/fish (PRENATAL GUMMIES PO), Take  by mouth., Disp: , Rfl:     methyldopa (ALDOMET) 250 mg tablet, Take 1 Tab by mouth three (3) times daily. , Disp: 90 Tab, Rfl: 3    No Known Allergies    Social History Socioeconomic History    Marital status: SINGLE     Spouse name: Not on file    Number of children: Not on file    Years of education: Not on file    Highest education level: Not on file   Occupational History    Not on file   Social Needs    Financial resource strain: Not on file    Food insecurity     Worry: Not on file     Inability: Not on file    Transportation needs     Medical: Not on file     Non-medical: Not on file   Tobacco Use    Smoking status: Never Smoker    Smokeless tobacco: Never Used   Substance and Sexual Activity    Alcohol use: No    Drug use: No    Sexual activity: Yes     Partners: Male     Birth control/protection: None   Lifestyle    Physical activity     Days per week: Not on file     Minutes per session: Not on file    Stress: Not on file   Relationships    Social connections     Talks on phone: Not on file     Gets together: Not on file     Attends [employer]ism service: Not on file     Active member of club or organization: Not on file     Attends meetings of clubs or organizations: Not on file     Relationship status: Not on file    Intimate partner violence     Fear of current or ex partner: Not on file     Emotionally abused: Not on file     Physically abused: Not on file     Forced sexual activity: Not on file   Other Topics Concern    Not on file   Social History Narrative    Not on file       Family History   Problem Relation Age of Onset    Hypertension Mother    [facility] Lupus Mother     Diabetes Father     Diabetes Maternal Grandmother     Hypertension Maternal Grandmother     Diabetes Paternal Grandmother        ROS   Constitutional: negative  Ears, Nose, Mouth, Throat, and Face: negative  Respiratory: negative  Cardiovascular: negative  Gastrointestinal: negative  Genitourinary:negative  Integument/Breast: Hidradenitis to the bilateral groins and left breast  Hematologic/Lymphatic: negative  Behavioral/Psychiatric: negative  Allergic/Immunologic: negative      Physical Exam:     Visit Vitals  BP (!) 154/90 (BP 1 Location: Left arm, BP Patient Position: Sitting)   Pulse (!) 106   Temp 98.9 °F (37.2 °C) (Oral)   Resp 18   Ht 5' 7\" (1.702 m)   Wt 229 lb (103.9 kg)   SpO2 98%   BMI 35.87 kg/m²       General - alert and oriented, no apparent distress  HEENT - no jaundice, no hearing imparement  Pulm - CTAB, no C/W/R  CV - RRR, no M/R/G  Abd -soft, nondistended  Ext - pulses intact in UE and LE bilaterally, no edema  Skin - supple, no rashes, right groin with about 3-4 separate areas of small areas of hidradenitis, no current drainage or infection, cyst palpable under the skin in the few spots. 2 spots on the left groin with no active inflammation, left breast with 2 spots on the underside of the breast about a centimeter in size of hidradenitis  Psychiatric - normal affect, good mood    Labs  None    Imaging  None  I have reviewed and agree with all of the pertinent images    Assessment:     Zack Austin is a 23 y.o. female with hidradenitis of the bilateral groins and left breast    Recommendations:     1. She does have hidradenitis of the bilateral groins left breast.  Right groin appears to be the area that is the most symptomatic. Generally I do not do both groins at the same time just due to mobility issues after surgery. We will address the most severe side on the right side and excised those 3-4 separate areas. Simple excision for these areas should be enough. She understands that she is at risk for developing new areas in the groins. We will also excise the area underneath the left breast that will need excision. The area underneath the left breast may require 1 or 2 separate incisions. Princess Torres MD    Greater than half of the time: 30 minutes was used in counciling the patient about bariatric surgery and the steps she needs to take to move forward with her surgery.     Ms. Bryant Britton has a reminder for a \"due or due soon\" health maintenance. I have asked that she contact her primary care provider for follow-up on this health maintenance.

## 2020-11-18 NOTE — PROGRESS NOTES
1. Have you been to the ER, urgent care clinic since your last visit? Hospitalized since your last visit? No     2. Have you seen or consulted any other health care providers outside of the [address] [place] [address] since your last visit? Include any pap smears or colon screening.   No

## 2020-11-23 ENCOUNTER — VIRTUAL VISIT (OUTPATIENT)
Dept: INTERNAL MEDICINE CLINIC | Age: 25
End: 2020-11-23
Payer: COMMERCIAL

## 2020-11-23 DIAGNOSIS — L73.2 SUPPURATIVE HIDRADENITIS: ICD-10-CM

## 2020-11-23 DIAGNOSIS — I10 ESSENTIAL HYPERTENSION: Primary | ICD-10-CM

## 2020-11-23 PROCEDURE — 99214 OFFICE O/P EST MOD 30 MIN: CPT | Performed by: INTERNAL MEDICINE

## 2020-11-23 RX ORDER — LISINOPRIL AND HYDROCHLOROTHIAZIDE 10; 12.5 MG/1; MG/1
1 TABLET ORAL DAILY
Qty: 30 TAB | Refills: 3 | Status: SHIPPED | OUTPATIENT
Start: 2020-11-23

## 2020-11-23 NOTE — PROGRESS NOTES
Chief Complaint   Patient presents with    Referral Follow Up     Patient states she is following up. 1. Have you been to the ER, urgent care clinic since your last visit? Hospitalized since your last visit? No    2. Have you seen or consulted any other health care providers outside of the [address] [place] [address] since your last visit? Include any pap smears or colon screening.  No

## 2020-11-25 ENCOUNTER — TRANSCRIBE ORDER (OUTPATIENT)
Dept: REGISTRATION | Age: 25
End: 2020-11-25

## 2020-11-25 DIAGNOSIS — Z01.812 PRE-PROCEDURE LAB EXAM: Primary | ICD-10-CM

## 2020-11-29 NOTE — PROGRESS NOTES
Ceasar Mohamud is a 23 y.o. female who was seen by synchronous (real-time) audio-video technology on 11/23/2020 for Referral Follow Up (Patient states she is following up. )        Assessment & Plan:   Diagnoses and all orders for this visit:    1. Essential hypertension  -     lisinopril-hydroCHLOROthiazide (PRINZIDE, ZESTORETIC) 10-12.5 mg per tablet; Take 1 Tab by mouth daily. 2. Suppurative hidradenitis  Follow up with surgeon  Other orders            Subjective:   Dr. Schrader Six had patient on labetalol during pregnancy    Was on lisinopril/hctz 10/12.5 prior to pregnancy    Most recently patient has noted elevated BP readings    She does have a bp monitor at home    She has seen Dr. Ofelia John surgery planned for December  Regarding hidradenitis  Given doxycycline per Dr. Ofelia John  Left breast and bilateral groin. He will address the right groin first        Prior to Admission medications    Medication Sig Start Date End Date Taking? Authorizing Provider   lisinopril-hydroCHLOROthiazide (PRINZIDE, ZESTORETIC) 10-12.5 mg per tablet Take 1 Tab by mouth daily. 11/23/20  Yes Nelly Cade MD   doxycycline (VIBRAMYCIN) 100 mg capsule Take 1 Cap by mouth two (2) times a day for 10 days. 11/18/20 11/28/20 Yes Angel Weems MD   cholecalciferol, vitamin D3, (VITAMIN D3 PO) Take 5,000 Units by mouth. Yes Provider, Historical   labetaloL (NORMODYNE) 100 mg tablet Take 1 Tab by mouth two (2) times a day. Patient taking differently: Take 200 mg by mouth two (2) times a day. 1/20/20  Yes Nelly Cade MD   doxycycline (VIBRAMYCIN) 100 mg capsule  1/4/20  Yes Provider, Historical   hydrocortisone (HYTONE) 2.5 % topical cream APPLY A THIN LAYER TO AFFECTED AREA TO AFFECTED AREA TWICE DAILY 6/26/19  Yes Nelly Cade MD   ketoconazole (NIZORAL) 2 % topical cream Apply  to affected area daily. 6/21/19  Yes Nelly Cade MD   PNV no.153/FA/om3/dha/epa/fish (PRENATAL GUMMIES PO) Take  by mouth.     Provider, Historical   methyldopa (ALDOMET) 250 mg tablet Take 1 Tab by mouth three (3) times daily. 1/15/20   Izabela Tai MD         Review of Systems   Constitutional: Negative for weight loss. Eyes: Negative for blurred vision. Respiratory: Negative for shortness of breath. Cardiovascular: Negative for chest pain and leg swelling. Genitourinary: Negative for frequency and urgency. Musculoskeletal: Negative for joint pain. Neurological: Negative for headaches.        Objective:     Patient-Reported Vitals 11/23/2020   Patient-Reported Weight -   Patient-Reported Height 57        [INSTRUCTIONS:  \"[x]\" Indicates a positive item  \"[]\" Indicates a negative item  -- DELETE ALL ITEMS NOT EXAMINED]    Constitutional: [x] Appears well-developed and well-nourished [x] No apparent distress      [] Abnormal -     Mental status: [x] Alert and awake  [x] Oriented to person/place/time [x] Able to follow commands    [] Abnormal -     Eyes:   EOM    [x]  Normal    [] Abnormal -   Sclera  [x]  Normal    [] Abnormal -          Discharge [x]  None visible   [] Abnormal -     HENT: [x] Normocephalic, atraumatic  [] Abnormal -   [x] Mouth/Throat: Mucous membranes are moist    External Ears [x] Normal  [] Abnormal -    Neck: [x] No visualized mass [] Abnormal -     Pulmonary/Chest: [x] Respiratory effort normal   [x] No visualized signs of difficulty breathing or respiratory distress        [] Abnormal -      Musculoskeletal:   [x] Normal gait with no signs of ataxia         [x] Normal range of motion of neck        [] Abnormal -     Neurological:        [x] No Facial Asymmetry (Cranial nerve 7 motor function) (limited exam due to video visit)          [x] No gaze palsy        [] Abnormal -          Skin:        [x] No significant exanthematous lesions or discoloration noted on facial skin         [] Abnormal -            Psychiatric:       [x] Normal Affect [] Abnormal -        [x] No Hallucinations    Other pertinent observable physical exam findings:-        We discussed the expected course, resolution and complications of the diagnosis(es) in detail. Medication risks, benefits, costs, interactions, and alternatives were discussed as indicated. I advised her to contact the office if her condition worsens, changes or fails to improve as anticipated. She expressed understanding with the diagnosis(es) and plan. Vadim Porter, who was evaluated through a patient-initiated, synchronous (real-time) audio-video encounter, and/or her healthcare decision maker, is aware that it is a billable service, with coverage as determined by her insurance carrier. She provided verbal consent to proceed: Yes, and patient identification was verified. It was conducted pursuant to the emergency declaration under the [address] [place] [address] authority and the [employer] General Act. A caregiver was present when appropriate. Ability to conduct physical exam was limited. I was at home. The patient was at home.       Elaine Mckee MD

## 2020-11-30 ENCOUNTER — HOSPITAL ENCOUNTER (OUTPATIENT)
Dept: PREADMISSION TESTING | Age: 25
Discharge: HOME | End: 2020-11-30
Payer: COMMERCIAL

## 2020-11-30 DIAGNOSIS — Z01.812 PRE-PROCEDURE LAB EXAM: ICD-10-CM

## 2020-11-30 PROCEDURE — 87635 SARS-COV-2 COVID-19 AMP PRB: CPT

## 2020-12-02 LAB — SARS-COV-2, COV2NT: NOT DETECTED

## 2021-04-07 ENCOUNTER — IMMUNIZATION (OUTPATIENT)
Dept: INTERNAL MEDICINE CLINIC | Age: 26
End: 2021-04-07
Payer: MEDICAID

## 2021-04-07 DIAGNOSIS — Z23 ENCOUNTER FOR IMMUNIZATION: Primary | ICD-10-CM

## 2021-04-07 PROCEDURE — 0001A COVID-19, MRNA, LNP-S, PF, 30MCG/0.3ML DOSE(PFIZER): CPT | Performed by: FAMILY MEDICINE

## 2021-04-07 PROCEDURE — 91300 COVID-19, MRNA, LNP-S, PF, 30MCG/0.3ML DOSE(PFIZER): CPT | Performed by: FAMILY MEDICINE

## 2021-06-14 ENCOUNTER — VIRTUAL VISIT (OUTPATIENT)
Dept: INTERNAL MEDICINE CLINIC | Age: 26
End: 2021-06-14
Payer: MEDICAID

## 2021-06-14 DIAGNOSIS — R10.84 GENERALIZED POSTPRANDIAL ABDOMINAL PAIN: Primary | ICD-10-CM

## 2021-06-14 DIAGNOSIS — R19.7 DIARRHEA, UNSPECIFIED TYPE: ICD-10-CM

## 2021-06-14 PROCEDURE — 99214 OFFICE O/P EST MOD 30 MIN: CPT | Performed by: INTERNAL MEDICINE

## 2021-06-14 NOTE — PROGRESS NOTES
Eduardo Tai is a 27 y.o. female who was seen by synchronous (real-time) audio-video technology on 6/14/2021 for No chief complaint on file. Assessment & Plan:   Diagnoses and all orders for this visit:    1. Generalized postprandial abdominal pain   NEED TO RULE OUT GALLBLADDER DISEASE VS GASTROENTERITIS VS INFECTIOUS DIARRHEA VS IBS VS MALABSORPTION  -     METABOLIC PANEL, COMPREHENSIVE; Future  -     CBC WITH AUTOMATED DIFF; Future  -     LIPASE; Future  -     REFERRAL TO GASTROENTEROLOGY  -     US ABD COMP; Future  -     CULTURE, STOOL; Future  -     OVA & PARASITES, STOOL; Future  -     C. DIFFICILE AG & TOXIN A/B; Future    2. Diarrhea, unspecified type  -     METABOLIC PANEL, COMPREHENSIVE; Future  -     CBC WITH AUTOMATED DIFF; Future  -     LIPASE; Future  -     REFERRAL TO GASTROENTEROLOGY  -     CULTURE, STOOL; Future  -     OVA & PARASITES, STOOL; Future  -     C. 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 Abdominal pain seems worse after eating. Appetite is not good. She says she has cramps followed by loose stools and can happen a few times a  day. She denies any sick contacts. She does work at a nursing facility. She says symptoms seem worse after food. No fever or chills, weakness black or blood in stool. no nausea or vomiting. No other associated symptoms. Prior to Admission medications    Medication Sig Start Date End Date Taking? Authorizing Provider   lisinopril-hydroCHLOROthiazide (PRINZIDE, ZESTORETIC) 10-12.5 mg per tablet Take 1 Tab by mouth daily. 11/23/20   Yovana Goldman MD   cholecalciferol, vitamin D3, (VITAMIN D3 PO) Take 5,000 Units by mouth.     Provider, Historical   PNV no.153/FA/om3/dha/epa/fish (PRENATAL GUMMIES PO) Take  by mouth. Provider, Historical   labetaloL (NORMODYNE) 100 mg tablet Take 1 Tab by mouth two (2) times a day. Patient taking differently: Take 200 mg by mouth two (2) times a day. 1/20/20   Agueda Goldman MD   methyldopa (ALDOMET) 250 mg tablet Take 1 Tab by mouth three (3) times daily. 1/15/20   Colleen Her MD   doxycycline (VIBRAMYCIN) 100 mg capsule  1/4/20   Provider, Historical   hydrocortisone (HYTONE) 2.5 % topical cream APPLY A THIN LAYER TO AFFECTED AREA TO AFFECTED AREA TWICE DAILY 6/26/19   Agueda Goldman MD   ketoconazole (NIZORAL) 2 % topical cream Apply  to affected area daily. 6/21/19   Agueda Goldman MD         Review of Systems   Gastrointestinal: Positive for abdominal pain and diarrhea.        Objective:     Patient-Reported Vitals 11/23/2020   Patient-Reported Weight -   Patient-Reported Height 57        [INSTRUCTIONS:  \"[x]\" Indicates a positive item  \"[]\" Indicates a negative item  -- DELETE ALL ITEMS NOT EXAMINED]    Constitutional: [x] Appears well-developed and well-nourished [x] No apparent distress      [] Abnormal -     Mental status: [x] Alert and awake  [x] Oriented to person/place/time [x] Able to follow commands    [] Abnormal -     Eyes:   EOM    [x]  Normal    [] Abnormal -   Sclera  [x]  Normal    [] Abnormal -          Discharge [x]  None visible   [] Abnormal -     HENT: [x] Normocephalic, atraumatic  [] Abnormal -   [x] Mouth/Throat: Mucous membranes are moist    External Ears [x] Normal  [] Abnormal -    Neck: [x] No visualized mass [] Abnormal -     Pulmonary/Chest: [x] Respiratory effort normal   [x] No visualized signs of difficulty breathing or respiratory distress        [] Abnormal -      Musculoskeletal:   [x] Normal gait with no signs of ataxia         [x] Normal range of motion of neck        [] Abnormal -     Neurological:        [x] No Facial Asymmetry (Cranial nerve 7 motor function) (limited exam due to video visit)          [x] No gaze palsy        [] Abnormal -          Skin:        [x] No significant exanthematous lesions or discoloration noted on facial skin         [] Abnormal -            Psychiatric:       [x] Normal Affect [] Abnormal -        [x] No Hallucinations    Other pertinent observable physical exam findings:-        We discussed the expected course, resolution and complications of the diagnosis(es) in detail. Medication risks, benefits, costs, interactions, and alternatives were discussed as indicated. I advised her to contact the office if her condition worsens, changes or fails to improve as anticipated. She expressed understanding with the diagnosis(es) and plan. Eduardo Tai, was evaluated through a synchronous (real-time) audio-video encounter. The patient (or guardian if applicable) is aware that this is a billable service. Verbal consent to proceed has been obtained within the past 12 months. The visit was conducted pursuant to the emergency declaration under the [address] [place] [address] authority and the [employer] General Act. Patient identification was verified, and a caregiver was present when appropriate. The patient was located in a state where the provider was credentialed to provide care.       Nathaly Frederick MD

## 2022-02-07 ENCOUNTER — APPOINTMENT (OUTPATIENT)
Dept: ULTRASOUND IMAGING | Age: 27
End: 2022-02-07
Payer: COMMERCIAL

## 2022-02-07 ENCOUNTER — APPOINTMENT (OUTPATIENT)
Dept: CT IMAGING | Age: 27
End: 2022-02-07
Payer: COMMERCIAL

## 2022-02-07 ENCOUNTER — HOSPITAL ENCOUNTER (EMERGENCY)
Age: 27
Discharge: HOME | End: 2022-02-07
Attending: STUDENT IN AN ORGANIZED HEALTH CARE EDUCATION/TRAINING PROGRAM
Payer: COMMERCIAL

## 2022-02-07 ENCOUNTER — APPOINTMENT (OUTPATIENT)
Dept: GENERAL RADIOLOGY | Age: 27
End: 2022-02-07
Payer: COMMERCIAL

## 2022-02-07 VITALS
DIASTOLIC BLOOD PRESSURE: 90 MMHG | RESPIRATION RATE: 16 BRPM | HEIGHT: 67 IN | OXYGEN SATURATION: 97 % | SYSTOLIC BLOOD PRESSURE: 126 MMHG | HEART RATE: 116 BPM | TEMPERATURE: 99.5 F | BODY MASS INDEX: 34.53 KG/M2 | WEIGHT: 220 LBS

## 2022-02-07 DIAGNOSIS — R10.32 ACUTE BILATERAL LOWER ABDOMINAL PAIN: Primary | ICD-10-CM

## 2022-02-07 DIAGNOSIS — R11.0 NAUSEA WITHOUT VOMITING: ICD-10-CM

## 2022-02-07 DIAGNOSIS — R10.31 ACUTE BILATERAL LOWER ABDOMINAL PAIN: Primary | ICD-10-CM

## 2022-02-07 LAB
ALBUMIN SERPL-MCNC: 4.3 G/DL (ref 3.5–5)
ALBUMIN/GLOB SERPL: 0.9 {RATIO} (ref 1.1–2.2)
ALP SERPL-CCNC: 69 U/L (ref 45–117)
ALT SERPL-CCNC: 30 U/L (ref 12–78)
ANION GAP SERPL CALC-SCNC: 15 MMOL/L (ref 5–15)
APPEARANCE UR: CLEAR
AST SERPL-CCNC: 26 U/L (ref 15–37)
BACTERIA URNS QL MICRO: NEGATIVE /HPF
BASOPHILS # BLD: 0 K/UL (ref 0–0.1)
BASOPHILS NFR BLD: 0 % (ref 0–1)
BILIRUB SERPL-MCNC: 0.9 MG/DL (ref 0.2–1)
BILIRUB UR QL: NEGATIVE
BUN SERPL-MCNC: 11 MG/DL (ref 6–20)
BUN/CREAT SERPL: 13 (ref 12–20)
CALCIUM SERPL-MCNC: 9.4 MG/DL (ref 8.5–10.1)
CHLORIDE SERPL-SCNC: 99 MMOL/L (ref 97–108)
CO2 SERPL-SCNC: 23 MMOL/L (ref 21–32)
COLOR UR: ABNORMAL
CREAT SERPL-MCNC: 0.83 MG/DL (ref 0.55–1.02)
DIFFERENTIAL METHOD BLD: ABNORMAL
EOSINOPHIL # BLD: 0.2 K/UL (ref 0–0.4)
EOSINOPHIL NFR BLD: 2 % (ref 0–7)
EPITH CASTS URNS QL MICRO: ABNORMAL /LPF
ERYTHROCYTE [DISTWIDTH] IN BLOOD BY AUTOMATED COUNT: 12.3 % (ref 11.5–14.5)
GLOBULIN SER CALC-MCNC: 4.7 G/DL (ref 2–4)
GLUCOSE SERPL-MCNC: 92 MG/DL (ref 65–100)
GLUCOSE UR STRIP.AUTO-MCNC: NEGATIVE MG/DL
HCG UR QL: NEGATIVE
HCT VFR BLD AUTO: 45.3 % (ref 35–47)
HGB BLD-MCNC: 15.1 G/DL (ref 11.5–16)
HGB UR QL STRIP: NEGATIVE
IMM GRANULOCYTES # BLD AUTO: 0 K/UL (ref 0–0.04)
IMM GRANULOCYTES NFR BLD AUTO: 0 % (ref 0–0.5)
KETONES UR QL STRIP.AUTO: NEGATIVE MG/DL
LEUKOCYTE ESTERASE UR QL STRIP.AUTO: NEGATIVE
LIPASE SERPL-CCNC: 91 U/L (ref 73–393)
LYMPHOCYTES # BLD: 1.5 K/UL (ref 0.8–3.5)
LYMPHOCYTES NFR BLD: 14 % (ref 12–49)
MCH RBC QN AUTO: 27.7 PG (ref 26–34)
MCHC RBC AUTO-ENTMCNC: 33.3 G/DL (ref 30–36.5)
MCV RBC AUTO: 83.1 FL (ref 80–99)
MONOCYTES # BLD: 0.6 K/UL (ref 0–1)
MONOCYTES NFR BLD: 6 % (ref 5–13)
MUCOUS THREADS URNS QL MICRO: ABNORMAL /LPF
NEUTS SEG # BLD: 8.1 K/UL (ref 1.8–8)
NEUTS SEG NFR BLD: 78 % (ref 32–75)
NITRITE UR QL STRIP.AUTO: NEGATIVE
NRBC # BLD: 0 K/UL (ref 0–0.01)
NRBC BLD-RTO: 0 PER 100 WBC
PH UR STRIP: 5.5 [PH] (ref 5–8)
PLATELET # BLD AUTO: 255 K/UL (ref 150–400)
PMV BLD AUTO: 12.1 FL (ref 8.9–12.9)
POTASSIUM SERPL-SCNC: 3.6 MMOL/L (ref 3.5–5.1)
PROT SERPL-MCNC: 9 G/DL (ref 6.4–8.2)
PROT UR STRIP-MCNC: NEGATIVE MG/DL
RBC # BLD AUTO: 5.45 M/UL (ref 3.8–5.2)
RBC #/AREA URNS HPF: ABNORMAL /HPF (ref 0–5)
SODIUM SERPL-SCNC: 137 MMOL/L (ref 136–145)
SP GR UR REFRACTOMETRY: 1.02 (ref 1–1.03)
TROPONIN-HIGH SENSITIVITY: 7 NG/L (ref 0–51)
UR CULT HOLD, URHOLD: NORMAL
UROBILINOGEN UR QL STRIP.AUTO: 0.2 EU/DL (ref 0.2–1)
WBC # BLD AUTO: 10.4 K/UL (ref 3.6–11)
WBC URNS QL MICRO: ABNORMAL /HPF (ref 0–4)

## 2022-02-07 PROCEDURE — 76830 TRANSVAGINAL US NON-OB: CPT

## 2022-02-07 PROCEDURE — 83690 ASSAY OF LIPASE: CPT

## 2022-02-07 PROCEDURE — 84484 ASSAY OF TROPONIN QUANT: CPT

## 2022-02-07 PROCEDURE — 96375 TX/PRO/DX INJ NEW DRUG ADDON: CPT

## 2022-02-07 PROCEDURE — 81025 URINE PREGNANCY TEST: CPT

## 2022-02-07 PROCEDURE — 76856 US EXAM PELVIC COMPLETE: CPT

## 2022-02-07 PROCEDURE — 99285 EMERGENCY DEPT VISIT HI MDM: CPT

## 2022-02-07 PROCEDURE — 71045 X-RAY EXAM CHEST 1 VIEW: CPT

## 2022-02-07 PROCEDURE — 81001 URINALYSIS AUTO W/SCOPE: CPT

## 2022-02-07 PROCEDURE — 85025 COMPLETE CBC W/AUTO DIFF WBC: CPT

## 2022-02-07 PROCEDURE — 74011000636 HC RX REV CODE- 636: Performed by: STUDENT IN AN ORGANIZED HEALTH CARE EDUCATION/TRAINING PROGRAM

## 2022-02-07 PROCEDURE — 74011250636 HC RX REV CODE- 250/636: Performed by: PHYSICIAN ASSISTANT

## 2022-02-07 PROCEDURE — 93005 ELECTROCARDIOGRAM TRACING: CPT

## 2022-02-07 PROCEDURE — 74177 CT ABD & PELVIS W/CONTRAST: CPT

## 2022-02-07 PROCEDURE — 96374 THER/PROPH/DIAG INJ IV PUSH: CPT

## 2022-02-07 PROCEDURE — 80053 COMPREHEN METABOLIC PANEL: CPT

## 2022-02-07 PROCEDURE — 36415 COLL VENOUS BLD VENIPUNCTURE: CPT

## 2022-02-07 RX ORDER — KETOROLAC TROMETHAMINE 30 MG/ML
30 INJECTION, SOLUTION INTRAMUSCULAR; INTRAVENOUS
Status: COMPLETED | OUTPATIENT
Start: 2022-02-07 | End: 2022-02-07

## 2022-02-07 RX ORDER — KETOROLAC TROMETHAMINE 10 MG/1
10 TABLET, FILM COATED ORAL
Qty: 20 TABLET | Refills: 0 | Status: SHIPPED | OUTPATIENT
Start: 2022-02-07 | End: 2022-02-12

## 2022-02-07 RX ORDER — ONDANSETRON 2 MG/ML
4 INJECTION INTRAMUSCULAR; INTRAVENOUS
Status: COMPLETED | OUTPATIENT
Start: 2022-02-07 | End: 2022-02-07

## 2022-02-07 RX ORDER — ACETAMINOPHEN 325 MG/1
650 TABLET ORAL
Status: DISCONTINUED | OUTPATIENT
Start: 2022-02-07 | End: 2022-02-07

## 2022-02-07 RX ORDER — ONDANSETRON 4 MG/1
4 TABLET, FILM COATED ORAL
Qty: 12 TABLET | Refills: 0 | Status: SHIPPED | OUTPATIENT
Start: 2022-02-07 | End: 2022-02-11

## 2022-02-07 RX ORDER — MORPHINE SULFATE 2 MG/ML
2 INJECTION, SOLUTION INTRAMUSCULAR; INTRAVENOUS
Status: COMPLETED | OUTPATIENT
Start: 2022-02-07 | End: 2022-02-07

## 2022-02-07 RX ADMIN — ONDANSETRON HYDROCHLORIDE 4 MG: 2 SOLUTION INTRAMUSCULAR; INTRAVENOUS at 14:17

## 2022-02-07 RX ADMIN — IOPAMIDOL 100 ML: 755 INJECTION, SOLUTION INTRAVENOUS at 15:47

## 2022-02-07 RX ADMIN — SODIUM CHLORIDE 1000 ML: 9 INJECTION, SOLUTION INTRAVENOUS at 14:17

## 2022-02-07 RX ADMIN — MORPHINE SULFATE 2 MG: 2 INJECTION, SOLUTION INTRAMUSCULAR; INTRAVENOUS at 14:17

## 2022-02-07 RX ADMIN — KETOROLAC TROMETHAMINE 30 MG: 30 INJECTION, SOLUTION INTRAMUSCULAR; INTRAVENOUS at 17:29

## 2022-02-07 NOTE — ED PROVIDER NOTES
Date of Service:  2/7/2022    Patient:  Inez Pastor    Chief Complaint:  Lower Abdominal Pain    HPI:  Inez Pastor is a 24 y.o.  female who presents for evaluation of left lower quadrant and right lower quadrant abdominal pain starting today. Patient states nausea. Patient denies vomiting, urinary symptoms, hematuria, constipation, or diarrhea. The history is provided by the patient. Past Medical History:   Diagnosis Date    Congenital absence of one kidney     Hidradenitis suppurativa     Hypertension     Obesity        Past Surgical History:   Procedure Laterality Date    HX ADENOIDECTOMY      HX LIPOSUCTION  04/2019    HX OTHER SURGICAL  04/2019    butt lift    HX OTHER SURGICAL  01/2020    tummy tuck    HX TONSILLECTOMY           Family History:   Problem Relation Age of Onset    Hypertension Mother    [facility] Lupus Mother     Diabetes Father     Diabetes Maternal Grandmother     Hypertension Maternal Grandmother     Diabetes Paternal Grandmother        Social History     Socioeconomic History    Marital status: SINGLE     Spouse name: Not on file    Number of children: Not on file    Years of education: Not on file    Highest education level: Not on file   Occupational History    Not on file   Tobacco Use    Smoking status: Never Smoker    Smokeless tobacco: Never Used   Substance and Sexual Activity    Alcohol use: No    Drug use: No    Sexual activity: Yes     Partners: Male     Birth control/protection: None   Other Topics Concern    Not on file   Social History Narrative    Not on file     Social Determinants of Health     Financial Resource Strain:     Difficulty of Paying Living Expenses: Not on file   Food Insecurity:     Worried About Running Out of Food in the Last Year: Not on file    Jessi of Food in the Last Year: Not on file   Transportation Needs:     Lack of Transportation (Medical): Not on file    Lack of Transportation (Non-Medical):  Not on file Physical Activity:     Days of Exercise per Week: Not on file    Minutes of Exercise per Session: Not on file   Stress:     Feeling of Stress : Not on file   Social Connections:     Frequency of Communication with Friends and Family: Not on file    Frequency of Social Gatherings with Friends and Family: Not on file    Attends Judaism Services: Not on file    Active Member of [address] [place] [address] or Organizations: Not on file    Attends Club or Organization Meetings: Not on file    Marital Status: Not on file   Intimate Partner Violence:     Fear of Current or Ex-Partner: Not on file    Emotionally Abused: Not on file    Physically Abused: Not on file    Sexually Abused: Not on file   Housing Stability:     Unable to Pay for Housing in the Last Year: Not on file    Number of [place] in the Last Year: Not on file    Unstable Housing in the Last Year: Not on file         ALLERGIES: Patient has no known allergies. Review of Systems   Constitutional: Negative for chills and fever. HENT: Negative for facial swelling. Eyes: Negative for visual disturbance. Respiratory: Negative for cough and shortness of breath. Cardiovascular: Negative for chest pain. Gastrointestinal: Positive for abdominal pain and nausea. Negative for blood in stool, constipation, diarrhea and vomiting. Genitourinary: Negative for difficulty urinating and dysuria. Musculoskeletal: Negative for neck stiffness. Skin: Negative for rash. Allergic/Immunologic: Negative for immunocompromised state. Neurological: Negative for headaches. Psychiatric/Behavioral: Negative for agitation. All other systems reviewed and are negative. Vitals:    02/07/22 1421 02/07/22 1536 02/07/22 1712 02/07/22 1730   BP:   (!) 133/98 (!) 130/95   Pulse:   (!) 118    Resp:   18    Temp:   (!) 100.9 °F (38.3 °C)    SpO2: 98% 98% 96% 98%   Weight:       Height:                Physical Exam  Vitals and nursing note reviewed. Constitutional:       General: She is not in acute distress. HENT:      Head: Atraumatic. Right Ear: External ear normal.      Left Ear: External ear normal.      Mouth/Throat:      Mouth: Mucous membranes are moist.   Eyes:      Conjunctiva/sclera: Conjunctivae normal.   Cardiovascular:      Rate and Rhythm: Normal rate and regular rhythm. Heart sounds: No murmur heard. Pulmonary:      Effort: Pulmonary effort is normal.      Breath sounds: Normal breath sounds. Abdominal:      Palpations: Abdomen is soft. Tenderness: There is abdominal tenderness. There is no right CVA tenderness or left CVA tenderness. Comments: TTP to RLQ and LLQ of abdomen   Musculoskeletal:         General: Normal range of motion. Cervical back: Normal range of motion and neck supple. Skin:     General: Skin is warm and dry. Neurological:      Mental Status: She is alert and oriented to person, place, and time. Mental status is at baseline.           MDM       Procedures        VITAL SIGNS:  Patient Vitals for the past 4 hrs:   Temp Pulse Resp BP SpO2   02/07/22 1730 -- -- -- (!) 130/95 98 %   02/07/22 1712 (!) 100.9 °F (38.3 °C) (!) 118 18 (!) 133/98 96 %   02/07/22 1536 -- -- -- -- 98 %   02/07/22 1421 -- -- -- -- 98 %   02/07/22 1406 -- -- -- (!) 137/91 97 %   02/07/22 1351 -- -- -- (!) 142/94 98 %         LABS:  Recent Results (from the past 6 hour(s))   URINALYSIS W/MICROSCOPIC    Collection Time: 02/07/22  1:23 PM   Result Value Ref Range    Color YELLOW/STRAW      Appearance CLEAR CLEAR      Specific gravity 1.024 1.003 - 1.030      pH (UA) 5.5 5.0 - 8.0      Protein Negative NEG mg/dL    Glucose Negative NEG mg/dL    Ketone Negative NEG mg/dL    Bilirubin Negative NEG      Blood Negative NEG      Urobilinogen 0.2 0.2 - 1.0 EU/dL    Nitrites Negative NEG      Leukocyte Esterase Negative NEG      WBC 0-4 0 - 4 /hpf    RBC 0-5 0 - 5 /hpf    Epithelial cells FEW FEW /lpf    Bacteria Negative NEG /hpf Mucus 1+ (A) NEG /lpf   URINE CULTURE HOLD SAMPLE    Collection Time: 02/07/22  1:23 PM    Specimen: Serum; Urine   Result Value Ref Range    Urine culture hold        Urine on hold in Microbiology dept for 2 days. If unpreserved urine is submitted, it cannot be used for addtional testing after 24 hours, recollection will be required. CBC WITH AUTOMATED DIFF    Collection Time: 02/07/22  1:23 PM   Result Value Ref Range    WBC 10.4 3.6 - 11.0 K/uL    RBC 5.45 (H) 3.80 - 5.20 M/uL    HGB 15.1 11.5 - 16.0 g/dL    HCT 45.3 35.0 - 47.0 %    MCV 83.1 80.0 - 99.0 FL    MCH 27.7 26.0 - 34.0 PG    MCHC 33.3 30.0 - 36.5 g/dL    RDW 12.3 11.5 - 14.5 %    PLATELET 433 623 - 767 K/uL    MPV 12.1 8.9 - 12.9 FL    NRBC 0.0 0  WBC    ABSOLUTE NRBC 0.00 0.00 - 0.01 K/uL    NEUTROPHILS 78 (H) 32 - 75 %    LYMPHOCYTES 14 12 - 49 %    MONOCYTES 6 5 - 13 %    EOSINOPHILS 2 0 - 7 %    BASOPHILS 0 0 - 1 %    IMMATURE GRANULOCYTES 0 0.0 - 0.5 %    ABS. NEUTROPHILS 8.1 (H) 1.8 - 8.0 K/UL    ABS. LYMPHOCYTES 1.5 0.8 - 3.5 K/UL    ABS. MONOCYTES 0.6 0.0 - 1.0 K/UL    ABS. EOSINOPHILS 0.2 0.0 - 0.4 K/UL    ABS. BASOPHILS 0.0 0.0 - 0.1 K/UL    ABS. IMM. GRANS. 0.0 0.00 - 0.04 K/UL    DF AUTOMATED     HCG URINE, QL. - POC    Collection Time: 02/07/22  1:25 PM   Result Value Ref Range    Pregnancy test,urine (POC) Negative NEG     METABOLIC PANEL, COMPREHENSIVE    Collection Time: 02/07/22  2:24 PM   Result Value Ref Range    Sodium 137 136 - 145 mmol/L    Potassium 3.6 3.5 - 5.1 mmol/L    Chloride 99 97 - 108 mmol/L    CO2 23 21 - 32 mmol/L    Anion gap 15 5 - 15 mmol/L    Glucose 92 65 - 100 mg/dL    BUN 11 6 - 20 MG/DL    Creatinine 0.83 0.55 - 1.02 MG/DL    BUN/Creatinine ratio 13 12 - 20      GFR est AA >60 >60 ml/min/1.73m2    GFR est non-AA >60 >60 ml/min/1.73m2    Calcium 9.4 8.5 - 10.1 MG/DL    Bilirubin, total 0.9 0.2 - 1.0 MG/DL    ALT (SGPT) 30 12 - 78 U/L    AST (SGOT) 26 15 - 37 U/L    Alk.  phosphatase 69 45 - 117 U/L Protein, total 9.0 (H) 6.4 - 8.2 g/dL    Albumin 4.3 3.5 - 5.0 g/dL    Globulin 4.7 (H) 2.0 - 4.0 g/dL    A-G Ratio 0.9 (L) 1.1 - 2.2     LIPASE    Collection Time: 02/07/22  2:24 PM   Result Value Ref Range    Lipase 91 73 - 393 U/L   TROPONIN-HIGH SENSITIVITY    Collection Time: 02/07/22  3:00 PM   Result Value Ref Range    Troponin-High Sensitivity 7 0 - 51 ng/L        IMAGING:  US PELV NON OBS   Final Result   Multiple simple appearing right ovarian cyst likely physiologic. No evidence of   hemorrhage. The left ovary was not visualized. IUD is present within the   endometrial canal. Otherwise no acute pathology. US TRANSVAGINAL   Final Result   Multiple simple appearing right ovarian cyst likely physiologic. No evidence of   hemorrhage. The left ovary was not visualized. IUD is present within the   endometrial canal. Otherwise no acute pathology. CT ABD PELV W CONT   Final Result      1. 3.3 cm right ovarian cystic structure with some internal enhancement,   possibly hemorrhagic cyst. Consider pelvic ultrasound for more definitive   evaluation. 2. Cholelithiasis without evidence of cholecystitis. XR CHEST PORT   Final Result   No acute cardiopulmonary process            Medications During Visit:  Medications   sodium chloride 0.9 % bolus infusion 1,000 mL (1,000 mL IntraVENous New Bag 2/7/22 1417)   ondansetron (ZOFRAN) injection 4 mg (4 mg IntraVENous Given 2/7/22 1417)   morphine injection 2 mg (2 mg IntraVENous Given 2/7/22 1417)   iopamidoL (ISOVUE-370) 76 % injection 100 mL (100 mL IntraVENous Given 2/7/22 1547)   ketorolac (TORADOL) injection 30 mg (30 mg IntraVENous Given 2/7/22 1729)         DECISION MAKING:  Kwadwo Aguirre is a 24 y.o. female who comes in as above. CT of the abdomen pelvis showed Cholelithiasis without cholecystitis. Pelvic Ultrasound showed no acute abnormality. Pt states abdominal pain has improved. Exam reassuring.   Patient instructed to [address]      IMPRESSION:  1. Acute bilateral lower abdominal pain    2. Nausea without vomiting        DISPOSITION:        Current Discharge Medication List      START taking these medications    Details   ondansetron hcl (Zofran) 4 mg tablet Take 1 Tablet by mouth every eight (8) hours as needed for Nausea or Vomiting for up to 4 days. Qty: 12 Tablet, Refills: 0  Start date: 2/7/2022, End date: 2/11/2022      ketorolac (TORADOL) 10 mg tablet Take 1 Tablet by mouth every six (6) hours as needed for Pain for up to 5 days. Qty: 20 Tablet, Refills: 0  Start date: 2/7/2022, End date: 2/12/2022              Follow-up Information     Follow up With Specialties Details Why [address] [place] [address]  Schedule an appointment as soon as possible for a visit   [address] [facility]  [phone]    [facility] [address] [facility]  If symptoms worsen [address]oxane Harper [address] [id number] [phone]    Yoel Villatoro MD Internal Medicine Schedule an appointment as soon as possible for a visit   [address] [id number] [phone]              The patient is asked to follow-up with their primary care provider in the next several days. They are to call tomorrow for an appointment. The patient is asked to return promptly for any increased concerns or worsening of symptoms. They can return to this emergency department or any other emergency department.

## 2022-02-07 NOTE — Clinical Note
[facility]  [address]  [address]  [phone]    Work/School Note    Date: 2/7/2022    To Whom It May concern:    Inez Pastor was seen and treated today in the emergency room by the following provider(s):  Attending Provider: Jana Marcelo MD  Physician Assistant: Particia Welch. Inez Pastor is excused from work/school on 02/07/22 and 02/08/22. She is medically clear to return to work/school on 2/9/2022.        Sincerely,          AMELIA Cancino

## 2022-02-07 NOTE — ED NOTES
Pt ambulatory out of ED with discharge instructions and prescriptions in hand given by Jamal Ivey., PA; pt verbalized understanding of discharge paperwork and time allotted for questions. VSS. Pt alert and oriented. Pt accompanied by mother.

## 2022-02-07 NOTE — Clinical Note
[facility]  [address]  [address]  [phone]    Work/School Note    Date: 2/7/2022    To Whom It May concern:    Yuridia Guillen was seen and treated today in the emergency room by the following provider(s):  Attending Provider: Rosi Wilson MD  Physician Assistant: Patricia Amaro. Yuridia Giullen is excused from work/school on 02/07/22 and 02/08/22. She is medically clear to return to work/school on 2/9/2022.        Sincerely,          AMELIA Mornoe

## 2022-02-07 NOTE — Clinical Note
[facility]  [address]  [address]  [phone]    Work/School Note    Date: 2/7/2022    To Whom It May concern:    Lin Glaser was seen and treated today in the emergency room by the following provider(s):  Attending Provider: Mak Rutledge MD  Physician Assistant: Patricia Ramos. Lin Glaser is excused from work/school on 02/07/22 and 02/08/22. She is medically clear to return to work/school on 2/9/2022.        Sincerely,          AMELIA Lopez

## 2022-02-07 NOTE — ED TRIAGE NOTES
Triage: pt c/o chills, LLQ pain, dysuria and lower back discomfort starting today. Hx of only one kidney. Denies fever, V/D, hematuria and vaginal discharge/bleeding.

## 2022-02-08 LAB
ATRIAL RATE: 119 BPM
CALCULATED P AXIS, ECG09: 56 DEGREES
CALCULATED R AXIS, ECG10: 68 DEGREES
CALCULATED T AXIS, ECG11: 2 DEGREES
DIAGNOSIS, 93000: NORMAL
P-R INTERVAL, ECG05: 148 MS
Q-T INTERVAL, ECG07: 336 MS
QRS DURATION, ECG06: 88 MS
QTC CALCULATION (BEZET), ECG08: 472 MS
VENTRICULAR RATE, ECG03: 119 BPM

## 2022-02-08 NOTE — ED NOTES
Pt called requesting medical record of visit from 2/7/2022 be sent over to [facility] to Cordell Enamorado MD. ED Chart Visit \"routed\" over to Cordell Enamorado MD and copy of diagnostic imaging to be placed on a CD for pt to picked up at ER registration desk prior to appointment later today with OB-GYN. Spoke with Chelsea Hernadez, Radiology technician and placing images on CD now.

## 2022-03-18 PROBLEM — Z3A.11 11 WEEKS GESTATION OF PREGNANCY: Status: ACTIVE | Noted: 2020-02-17

## 2022-03-18 PROBLEM — E66.01 SEVERE OBESITY (HCC): Status: ACTIVE | Noted: 2018-10-09

## 2022-03-19 PROBLEM — G43.909 MIGRAINE WITHOUT STATUS MIGRAINOSUS, NOT INTRACTABLE: Status: ACTIVE | Noted: 2018-10-09

## 2022-03-20 PROBLEM — L68.0 HIRSUTISM: Status: ACTIVE | Noted: 2018-10-09

## 2022-04-18 RX ORDER — LISINOPRIL AND HYDROCHLOROTHIAZIDE 10; 12.5 MG/1; MG/1
1 TABLET ORAL DAILY
Qty: 30 TABLET | Refills: 3 | Status: CANCELLED | OUTPATIENT
Start: 2022-04-18

## 2022-09-03 ENCOUNTER — HOSPITAL ENCOUNTER (INPATIENT)
Age: 27
LOS: 5 days | Discharge: HOME | DRG: 418 | End: 2022-09-08
Attending: STUDENT IN AN ORGANIZED HEALTH CARE EDUCATION/TRAINING PROGRAM | Admitting: HOSPITALIST
Payer: COMMERCIAL

## 2022-09-03 ENCOUNTER — APPOINTMENT (OUTPATIENT)
Dept: CT IMAGING | Age: 27
DRG: 418 | End: 2022-09-03
Attending: STUDENT IN AN ORGANIZED HEALTH CARE EDUCATION/TRAINING PROGRAM
Payer: COMMERCIAL

## 2022-09-03 DIAGNOSIS — K80.50 CHOLEDOCHOLITHIASIS: Primary | ICD-10-CM

## 2022-09-03 DIAGNOSIS — R10.11 ABDOMINAL PAIN, RIGHT UPPER QUADRANT: ICD-10-CM

## 2022-09-03 DIAGNOSIS — R74.01 ELEVATED TRANSAMINASE LEVEL: ICD-10-CM

## 2022-09-03 DIAGNOSIS — K83.1 BILIARY OBSTRUCTION: ICD-10-CM

## 2022-09-03 LAB
ALBUMIN SERPL-MCNC: 3.9 G/DL (ref 3.5–5)
ALBUMIN/GLOB SERPL: 1.1 {RATIO} (ref 1.1–2.2)
ALP SERPL-CCNC: 120 U/L (ref 45–117)
ALT SERPL-CCNC: 809 U/L (ref 12–78)
ANION GAP SERPL CALC-SCNC: 10 MMOL/L (ref 5–15)
APPEARANCE UR: ABNORMAL
AST SERPL-CCNC: 389 U/L (ref 15–37)
BACTERIA URNS QL MICRO: NEGATIVE /HPF
BASOPHILS # BLD: 0 K/UL (ref 0–0.1)
BASOPHILS NFR BLD: 1 % (ref 0–1)
BILIRUB SERPL-MCNC: 4.6 MG/DL (ref 0.2–1)
BILIRUB UR QL CFM: POSITIVE
BUN SERPL-MCNC: 5 MG/DL (ref 6–20)
BUN/CREAT SERPL: 7 (ref 12–20)
CALCIUM SERPL-MCNC: 8.3 MG/DL (ref 8.5–10.1)
CHLORIDE SERPL-SCNC: 103 MMOL/L (ref 97–108)
CO2 SERPL-SCNC: 27 MMOL/L (ref 21–32)
COLOR UR: ABNORMAL
COMMENT, HOLDF: NORMAL
CREAT SERPL-MCNC: 0.73 MG/DL (ref 0.55–1.02)
DIFFERENTIAL METHOD BLD: NORMAL
EOSINOPHIL # BLD: 0.2 K/UL (ref 0–0.4)
EOSINOPHIL NFR BLD: 6 % (ref 0–7)
EPITH CASTS URNS QL MICRO: ABNORMAL /LPF
ERYTHROCYTE [DISTWIDTH] IN BLOOD BY AUTOMATED COUNT: 12.5 % (ref 11.5–14.5)
GLOBULIN SER CALC-MCNC: 3.7 G/DL (ref 2–4)
GLUCOSE SERPL-MCNC: 98 MG/DL (ref 65–100)
GLUCOSE UR STRIP.AUTO-MCNC: NEGATIVE MG/DL
HCG UR QL: NEGATIVE
HCT VFR BLD AUTO: 38.8 % (ref 35–47)
HGB BLD-MCNC: 13.2 G/DL (ref 11.5–16)
HGB UR QL STRIP: NEGATIVE
IMM GRANULOCYTES # BLD AUTO: 0 K/UL (ref 0–0.04)
IMM GRANULOCYTES NFR BLD AUTO: 0 % (ref 0–0.5)
KETONES UR QL STRIP.AUTO: NEGATIVE MG/DL
LEUKOCYTE ESTERASE UR QL STRIP.AUTO: ABNORMAL
LIPASE SERPL-CCNC: 129 U/L (ref 73–393)
LYMPHOCYTES # BLD: 1.3 K/UL (ref 0.8–3.5)
LYMPHOCYTES NFR BLD: 33 % (ref 12–49)
MCH RBC QN AUTO: 28.3 PG (ref 26–34)
MCHC RBC AUTO-ENTMCNC: 34 G/DL (ref 30–36.5)
MCV RBC AUTO: 83.1 FL (ref 80–99)
MONOCYTES # BLD: 0.4 K/UL (ref 0–1)
MONOCYTES NFR BLD: 11 % (ref 5–13)
NEUTS SEG # BLD: 2 K/UL (ref 1.8–8)
NEUTS SEG NFR BLD: 49 % (ref 32–75)
NITRITE UR QL STRIP.AUTO: NEGATIVE
NRBC # BLD: 0 K/UL (ref 0–0.01)
NRBC BLD-RTO: 0 PER 100 WBC
PH UR STRIP: 6 [PH] (ref 5–8)
PLATELET # BLD AUTO: 206 K/UL (ref 150–400)
PMV BLD AUTO: 11.5 FL (ref 8.9–12.9)
POTASSIUM SERPL-SCNC: 3.7 MMOL/L (ref 3.5–5.1)
PROT SERPL-MCNC: 7.6 G/DL (ref 6.4–8.2)
PROT UR STRIP-MCNC: NEGATIVE MG/DL
RBC # BLD AUTO: 4.67 M/UL (ref 3.8–5.2)
RBC #/AREA URNS HPF: ABNORMAL /HPF (ref 0–5)
SAMPLES BEING HELD,HOLD: NORMAL
SODIUM SERPL-SCNC: 140 MMOL/L (ref 136–145)
SP GR UR REFRACTOMETRY: 1.02 (ref 1–1.03)
UR CULT HOLD, URHOLD: NORMAL
UROBILINOGEN UR QL STRIP.AUTO: 1 EU/DL (ref 0.2–1)
WBC # BLD AUTO: 3.9 K/UL (ref 3.6–11)
WBC URNS QL MICRO: ABNORMAL /HPF (ref 0–4)

## 2022-09-03 PROCEDURE — 96375 TX/PRO/DX INJ NEW DRUG ADDON: CPT

## 2022-09-03 PROCEDURE — 99285 EMERGENCY DEPT VISIT HI MDM: CPT

## 2022-09-03 PROCEDURE — 80053 COMPREHEN METABOLIC PANEL: CPT

## 2022-09-03 PROCEDURE — 96374 THER/PROPH/DIAG INJ IV PUSH: CPT

## 2022-09-03 PROCEDURE — 74011000250 HC RX REV CODE- 250: Performed by: EMERGENCY MEDICINE

## 2022-09-03 PROCEDURE — 82248 BILIRUBIN DIRECT: CPT

## 2022-09-03 PROCEDURE — 81025 URINE PREGNANCY TEST: CPT

## 2022-09-03 PROCEDURE — 81001 URINALYSIS AUTO W/SCOPE: CPT

## 2022-09-03 PROCEDURE — 74177 CT ABD & PELVIS W/CONTRAST: CPT

## 2022-09-03 PROCEDURE — 36415 COLL VENOUS BLD VENIPUNCTURE: CPT

## 2022-09-03 PROCEDURE — 0FC98ZZ EXTIRPATION OF MATTER FROM COMMON BILE DUCT, VIA NATURAL OR ARTIFICIAL OPENING ENDOSCOPIC: ICD-10-PCS | Performed by: INTERNAL MEDICINE

## 2022-09-03 PROCEDURE — 74011250636 HC RX REV CODE- 250/636: Performed by: STUDENT IN AN ORGANIZED HEALTH CARE EDUCATION/TRAINING PROGRAM

## 2022-09-03 PROCEDURE — 83690 ASSAY OF LIPASE: CPT

## 2022-09-03 PROCEDURE — 65270000029 HC RM PRIVATE

## 2022-09-03 PROCEDURE — 96361 HYDRATE IV INFUSION ADD-ON: CPT

## 2022-09-03 PROCEDURE — 74011250637 HC RX REV CODE- 250/637: Performed by: EMERGENCY MEDICINE

## 2022-09-03 PROCEDURE — 74011000636 HC RX REV CODE- 636: Performed by: STUDENT IN AN ORGANIZED HEALTH CARE EDUCATION/TRAINING PROGRAM

## 2022-09-03 PROCEDURE — 85025 COMPLETE CBC W/AUTO DIFF WBC: CPT

## 2022-09-03 RX ORDER — KETOROLAC TROMETHAMINE 30 MG/ML
15 INJECTION, SOLUTION INTRAMUSCULAR; INTRAVENOUS
Status: COMPLETED | OUTPATIENT
Start: 2022-09-03 | End: 2022-09-03

## 2022-09-03 RX ORDER — ONDANSETRON 2 MG/ML
4 INJECTION INTRAMUSCULAR; INTRAVENOUS
Status: COMPLETED | OUTPATIENT
Start: 2022-09-03 | End: 2022-09-03

## 2022-09-03 RX ADMIN — LIDOCAINE HYDROCHLORIDE 40 ML: 20 SOLUTION TOPICAL at 22:10

## 2022-09-03 RX ADMIN — IOPAMIDOL 100 ML: 755 INJECTION, SOLUTION INTRAVENOUS at 14:29

## 2022-09-03 RX ADMIN — KETOROLAC TROMETHAMINE 15 MG: 30 INJECTION, SOLUTION INTRAMUSCULAR; INTRAVENOUS at 13:26

## 2022-09-03 RX ADMIN — ONDANSETRON 4 MG: 2 INJECTION INTRAMUSCULAR; INTRAVENOUS at 13:26

## 2022-09-03 RX ADMIN — SODIUM CHLORIDE 1000 ML: 9 INJECTION, SOLUTION INTRAVENOUS at 13:26

## 2022-09-03 NOTE — PROGRESS NOTES
Transfer from [place] ED  --For Hyperbilirubinemia, and Cholelithiasis / Choledocholithiasis  --For requested GI Consultation, RUQ US/MRCP

## 2022-09-03 NOTE — ED PROVIDER NOTES
Date: 9/3/2022  Patient Name: Vladislav Mathis    History of Presenting Illness     Chief Complaint   Patient presents with    LOW BACK PAIN     Pt c/o low back pain x 5d, abdominal pain, nausea       History Provided By: Patient    HPI: Vladislav Mathis, 24 y.o. female  presents to the ED with cc of abdominal pain and back pain. States that been ongoing for the past week, is episodic and associated with pain in the mid back, bilateral but favors the right as well as the mid and upper abdomen. At times it happens with without eating but at other times it seems to be worsened by eating dairy. However she has modified her diet and does not see much of a difference. She has been on a clear liquid diet last day and still having symptoms. She is in between episodes of severe pain at this time. Was given tramadol by her PCP which has not been helping as much anymore. She denies vomiting, diarrhea. She has had some nausea. No fevers/chills. Notes that her urine has been dark but no dysuria or hematuria. There are no other complaints, changes, or physical findings at this time. PCP: Martha Barfield MD    No current facility-administered medications on file prior to encounter. Current Outpatient Medications on File Prior to Encounter   Medication Sig Dispense Refill    lisinopril-hydroCHLOROthiazide (PRINZIDE, ZESTORETIC) 10-12.5 mg per tablet Take 1 Tab by mouth daily.  27 Tab 3       Past History     Past Medical History:  Past Medical History:   Diagnosis Date    Congenital absence of one kidney     Hidradenitis suppurativa     Hypertension     Obesity        Past Surgical History:  Past Surgical History:   Procedure Laterality Date    HX ADENOIDECTOMY      HX LIPOSUCTION  04/2019    HX OTHER SURGICAL  04/2019    butt lift    HX OTHER SURGICAL  01/2020    tummy tuck    HX TONSILLECTOMY         Family History:  Family History   Problem Relation Age of Onset    Hypertension Mother     Lupus Mother Diabetes Father     Diabetes Maternal Grandmother     Hypertension Maternal Grandmother     Diabetes Paternal Grandmother        Social History:  Social History     Tobacco Use    Smoking status: Never    Smokeless tobacco: Never   Substance Use Topics    Alcohol use: No    Drug use: No       Allergies:  No Known Allergies      Review of Systems   Review of Systems   Constitutional:  Positive for fatigue. Negative for chills and fever. Eyes:  Negative for photophobia. Respiratory:  Negative for shortness of breath. Cardiovascular:  Negative for chest pain. Gastrointestinal:  Positive for abdominal pain and nausea. Negative for vomiting. Genitourinary:  Negative for dysuria. Musculoskeletal:  Positive for back pain. Neurological:  Negative for headaches. Psychiatric/Behavioral:  Negative for confusion. All other systems reviewed and are negative. Physical Exam   Physical Exam  Vitals and nursing note reviewed. Constitutional:       General: She is not in acute distress. HENT:      Head: Normocephalic and atraumatic. Mouth/Throat:      Mouth: Mucous membranes are moist.   Eyes:      Extraocular Movements: Extraocular movements intact. Cardiovascular:      Rate and Rhythm: Normal rate and regular rhythm. Pulses: Normal pulses. Pulmonary:      Effort: Pulmonary effort is normal.      Breath sounds: Normal breath sounds. Abdominal:      Palpations: Abdomen is soft. Tenderness: There is abdominal tenderness (r mid abdomen, ruq). There is no right CVA tenderness or left CVA tenderness. Musculoskeletal:      Right lower leg: No edema. Left lower leg: No edema. Skin:     General: Skin is warm and dry. Neurological:      General: No focal deficit present. Mental Status: She is alert. Mental status is at baseline. Motor: No weakness.    Psychiatric:         Mood and Affect: Mood normal.       Diagnostic Study Results     Labs -  Recent Results (from the past 72 hour(s))   SAMPLES BEING HELD    Collection Time: 09/03/22 12:50 PM   Result Value Ref Range    SAMPLES BEING HELD GREEN, LAV, RED, BLUE     COMMENT        Add-on orders for these samples will be processed based on acceptable specimen integrity and analyte stability, which may vary by analyte. CBC WITH AUTOMATED DIFF    Collection Time: 09/03/22 12:50 PM   Result Value Ref Range    WBC 3.9 3.6 - 11.0 K/uL    RBC 4.67 3.80 - 5.20 M/uL    HGB 13.2 11.5 - 16.0 g/dL    HCT 38.8 35.0 - 47.0 %    MCV 83.1 80.0 - 99.0 FL    MCH 28.3 26.0 - 34.0 PG    MCHC 34.0 30.0 - 36.5 g/dL    RDW 12.5 11.5 - 14.5 %    PLATELET 493 325 - 653 K/uL    MPV 11.5 8.9 - 12.9 FL    NRBC 0.0 0  WBC    ABSOLUTE NRBC 0.00 0.00 - 0.01 K/uL    NEUTROPHILS 49 32 - 75 %    LYMPHOCYTES 33 12 - 49 %    MONOCYTES 11 5 - 13 %    EOSINOPHILS 6 0 - 7 %    BASOPHILS 1 0 - 1 %    IMMATURE GRANULOCYTES 0 0.0 - 0.5 %    ABS. NEUTROPHILS 2.0 1.8 - 8.0 K/UL    ABS. LYMPHOCYTES 1.3 0.8 - 3.5 K/UL    ABS. MONOCYTES 0.4 0.0 - 1.0 K/UL    ABS. EOSINOPHILS 0.2 0.0 - 0.4 K/UL    ABS. BASOPHILS 0.0 0.0 - 0.1 K/UL    ABS. IMM. GRANS. 0.0 0.00 - 0.04 K/UL    DF AUTOMATED     METABOLIC PANEL, COMPREHENSIVE    Collection Time: 09/03/22 12:50 PM   Result Value Ref Range    Sodium 140 136 - 145 mmol/L    Potassium 3.7 3.5 - 5.1 mmol/L    Chloride 103 97 - 108 mmol/L    CO2 27 21 - 32 mmol/L    Anion gap 10 5 - 15 mmol/L    Glucose 98 65 - 100 mg/dL    BUN 5 (L) 6 - 20 MG/DL    Creatinine 0.73 0.55 - 1.02 MG/DL    BUN/Creatinine ratio 7 (L) 12 - 20      GFR est AA >60 >60 ml/min/1.73m2    GFR est non-AA >60 >60 ml/min/1.73m2    Calcium 8.3 (L) 8.5 - 10.1 MG/DL    Bilirubin, total 4.6 (H) 0.2 - 1.0 MG/DL    ALT (SGPT) 809 (H) 12 - 78 U/L    AST (SGOT) 389 (H) 15 - 37 U/L    Alk.  phosphatase 120 (H) 45 - 117 U/L    Protein, total 7.6 6.4 - 8.2 g/dL    Albumin 3.9 3.5 - 5.0 g/dL    Globulin 3.7 2.0 - 4.0 g/dL    A-G Ratio 1.1 1.1 - 2.2     LIPASE    Collection Time: 09/03/22 12:50 PM   Result Value Ref Range    Lipase 129 73 - 393 U/L   BILIRUBIN, DIRECT    Collection Time: 09/03/22 12:50 PM   Result Value Ref Range    Bilirubin, direct 3.3 (H) 0.0 - 0.2 MG/DL   URINALYSIS W/MICROSCOPIC    Collection Time: 09/03/22  2:06 PM   Result Value Ref Range    Color DARK YELLOW      Appearance HAZY (A) CLEAR      Specific gravity 1.020 1.003 - 1.030      pH (UA) 6.0 5.0 - 8.0      Protein Negative NEG mg/dL    Glucose Negative NEG mg/dL    Ketone Negative NEG mg/dL    Blood Negative NEG      Urobilinogen 1.0 0.2 - 1.0 EU/dL    Nitrites Negative NEG      Leukocyte Esterase SMALL (A) NEG      WBC 5-10 0 - 4 /hpf    RBC 0-5 0 - 5 /hpf    Epithelial cells MANY (A) FEW /lpf    Bacteria Negative NEG /hpf   URINE CULTURE HOLD SAMPLE    Collection Time: 09/03/22  2:06 PM    Specimen: Serum; Urine   Result Value Ref Range    Urine culture hold        Urine on hold in Microbiology dept for 2 days. If unpreserved urine is submitted, it cannot be used for addtional testing after 24 hours, recollection will be required. BILIRUBIN, CONFIRM    Collection Time: 09/03/22  2:06 PM   Result Value Ref Range    Bilirubin UA, confirm Positive (A) NEG     HCG URINE, QL. - POC    Collection Time: 09/03/22  2:07 PM   Result Value Ref Range    Pregnancy test,urine (POC) Negative NEG     HEPATIC FUNCTION PANEL    Collection Time: 09/04/22  3:48 PM   Result Value Ref Range    Protein, total 8.1 6.4 - 8.2 g/dL    Albumin 4.0 3.5 - 5.0 g/dL    Globulin 4.1 (H) 2.0 - 4.0 g/dL    A-G Ratio 1.0 (L) 1.1 - 2.2      Bilirubin, total 5.0 (H) 0.2 - 1.0 MG/DL    Bilirubin, direct 3.8 (H) 0.0 - 0.2 MG/DL    Alk.  phosphatase 134 (H) 45 - 117 U/L    AST (SGOT) 197 (H) 15 - 37 U/L    ALT (SGPT) 633 (H) 12 - 78 U/L   HIV 1/2 AG/AB, 4TH GENERATION,W RFLX CONFIRM    Collection Time: 09/04/22  6:16 PM   Result Value Ref Range    HIV 1/2 Interpretation NONREACTIVE NR      HIV 1/2 result comment SEE NOTE     HEPATITIS PANEL, ACUTE Collection Time: 09/04/22  6:16 PM   Result Value Ref Range    Hepatitis A, IgM NONREACTIVE NR      __          Hepatitis B surface Ag <0.10 Index    Hep B surface Ag Interp. Negative NEG      __          Hepatitis B core, IgM NONREACTIVE NR      __          Hep C virus Ab Interp. NONREACTIVE NR     ACETAMINOPHEN    Collection Time: 09/04/22  6:16 PM   Result Value Ref Range    Acetaminophen level <2 (L) 10 - 30 ug/mL       Radiologic Studies -   MRI ABD W MRCP W WO CONT   Final Result   Cholelithiasis. No evidence of acute cholecystitis. US ABD LTD   Final Result   1. Cholelithiasis with evidence of acute cholecystitis. 2.  Hepatic parenchymal disease. 3.  No right hydronephrosis. CT ABD PELV W CONT   Final Result      1. No acute abdominal or pelvic pathology. 2. Cholelithiasis. No biliary ductal dilatation. 3. IUD within the uterus. CT Results  (Last 48 hours)                 09/03/22 1429  CT ABD PELV W CONT Final result    Impression:      1. No acute abdominal or pelvic pathology. 2. Cholelithiasis. No biliary ductal dilatation. 3. IUD within the uterus. Narrative:  EXAM: CT ABD PELV W CONT       INDICATION: right uper and mid abdominal pain       COMPARISON: CT February 2022        CONTRAST: 100 mL of Isovue-370. ORAL CONTRAST: Not given       TECHNIQUE:    Following the uneventful intravenous administration of contrast, thin axial   images were obtained through the abdomen and pelvis. Coronal and sagittal   reconstructions were generated. CT dose reduction was achieved through use of a   standardized protocol tailored for this examination and automatic exposure   control for dose modulation. FINDINGS:    LOWER THORAX: No significant abnormality in the incidentally imaged lower chest.   LIVER: No mass. BILIARY TREE: Gallstones are noted in the gallbladder. CBD is not dilated. SPLEEN: within normal limits.    PANCREAS: No mass or ductal dilatation. ADRENALS: Unremarkable. KIDNEYS: Congenitally absent/severely atrophic left kidney with compensatory   hypertrophy of the right kidney. No hydronephrosis. STOMACH: Unremarkable. SMALL BOWEL: No dilatation or wall thickening. COLON: No dilatation or wall thickening. APPENDIX: Normal   PERITONEUM: No ascites or pneumoperitoneum. RETROPERITONEUM: No lymphadenopathy or aortic aneurysm. REPRODUCTIVE ORGANS: Uterus is noted and contains an intrauterine device. There   is no significant pelvic free fluid. URINARY BLADDER: No mass or calculus. BONES: No destructive bone lesion. ABDOMINAL WALL: No mass or hernia. ADDITIONAL COMMENTS: N/A                 CXR Results  (Last 48 hours)      None            Medical Decision Making   I am the first provider for this patient. I reviewed the vital signs, available nursing notes, past medical history, past surgical history, family history and social history. Vital Signs-Reviewed the patient's vital signs. Patient Vitals for the past 12 hrs:   Temp Pulse Resp BP SpO2   09/04/22 2011 99.2 °F (37.3 °C) 78 18 112/79 99 %   09/04/22 1408 98.4 °F (36.9 °C) 78 17 121/84 97 %         Records Reviewed: Nursing Notes and Old Medical Records    Provider Notes (Medical Decision Making):   [facility] Consult for Admission  4:22 PM    ED Room Number: 560/27  Patient Name and age:  Heidi North 24 y.o.  female  Working Diagnosis:   1. Choledocholithiasis    2. Elevated transaminase level    3. Biliary obstruction    4.  Abdominal pain, right upper quadrant        COVID-19 Suspicion:  no  Sepsis present:  no  Reassessment needed: no  Code Status:  Full Code  Readmission: no  Isolation Requirements:  no  Recommended Level of Care:  med/surg  Department:[facility] - [phone]  Other:        The patient presents with abdominal pain with a differential diagnosis of abdominal pain, biliary colic, cholecystitis, diverticulitis, gastritis, GERD, pancreatitis, pregnancy, PUD, renal Colic, and UTI     1:19 PM patient was found to have obstructive pattern of her liver function test, although she does not have biliary ductal dilatation she likely has a partially obstructing stone given that she has multiple gallstones. She is currently not having an episode of pain but her pain episodes have been excruciating recently. Would benefit from admission and GI evaluation. Diagnosis     Clinical Impression:   1. Choledocholithiasis    2. Elevated transaminase level    3. Biliary obstruction    4. Abdominal pain, right upper quadrant        Attestations:    Sapphire Campoverde MD    Please note that this dictation was completed with [employer], the computer voice recognition software. Quite often unanticipated grammatical, syntax, homophones, and other interpretive errors are inadvertently transcribed by the computer software. Please disregard these errors. Please excuse any errors that have escaped final proofreading. Thank you.

## 2022-09-03 NOTE — ED NOTES
Spoke with patient regarding her in-patient insurance coverage. Patient states she called her insurance company and spoke with a customer representative who informed her that [facility] is in network. Patient requesting to be admitted to [facility].

## 2022-09-03 NOTE — ED NOTES
Per Myles Juárez with [facility] transfer center, all [facility] facilities [facility], [facility], and [place]) are closed to transfers including ED to ED. Requested for Amarilys to check with [facility].

## 2022-09-03 NOTE — ED TRIAGE NOTES
Pt c/o low back pain x 5d, nausea, r side abdominal pain with deep inhalation, nausea, dark urine. Denies fevers, vomiting.

## 2022-09-03 NOTE — DISCHARGE INSTRUCTIONS
Discharge Instructions for General Surgery Patients       Do not lift any objects weighing more than 15 pounds for 2 weeks. Do not do any housework including vacuuming, scrubbing or yardwork for 4 weeks. Do not drive or operate machinery while taking sedating or narcotic medications. Post operative pain is expected. Try to wean off narcotics as soon as able and take tylenol or NSAIDS. Do not take tylenol with Norco or Percocet as this may harm your liver. No NSAIDS if you are bariatric surgery patient. You may walk as desired and go up and down stairs as needed. Walking is encouraged. You may shower the day after surgery. Do not take tub baths, swim or use hot tubs for 2 weeks. Pat dry wounds after with a towel. Leave glue on wounds. It will fall off with time. Do not scrub around incisions. If redness develops around the glue okay to peel off in hot shower. Regular diet. Take Miralax once or twice a day as needed for constipation. Follow up with provider as scheduled. If you experience fever (greater than 101.5), chills, vomiting or redness or drainage at surgical site, please contact your surgeons office. If you have further questions or concerns, please call your surgeons office at [phone]. Discharge Instructions       PATIENT ID: Georgia Greenfield  MRN: [id number]   YOB: 1995    DATE OF ADMISSION: [unfilled]    DATE OF DISCHARGE: 9/8/2022    PRIMARY CARE PROVIDER: @PCP@     ATTENDING PHYSICIAN: [unfilled]  DISCHARGING PROVIDER: Tong Hernandez MD    To contact this individual call [phone] and ask the  to page. If unavailable ask to be transferred the Adult Hospitalist Department.     DISCHARGE DIAGNOSES Cholelithiasis    CONSULTATIONS: Surgery/GI    PROCEDURES/SURGERIES: Procedure(s):  ENDOSCOPIC RETROGRADE CHOLANGIOPANCREATOGRAPHY (ERCP)  ENDOSCOPIC SPHINCTEROTOMY  ENDOSCOPIC STONE EXTRACTION/BALLOON SWEEP    PENDING TEST RESULTS:   At the time of discharge the following test results are still pending: none    FOLLOW UP APPOINTMENTS:   PCP  Surgery    ADDITIONAL CARE RECOMMENDATIONS:   As above  Please expect a blood work  (CBC, CMP) in 1 week    DIET: Cardiac Diet    ACTIVITY: Activity as tolerated      DISCHARGE MEDICATIONS:   See Medication Reconciliation Form    It is important that you take the medication exactly as they are prescribed. Keep your medication in the bottles provided by the pharmacist and keep a list of the medication names, dosages, and times to be taken in your wallet. Do not take other medications without consulting your doctor. NOTIFY YOUR PHYSICIAN FOR ANY OF THE FOLLOWING:   Fever over 101 degrees for 24 hours. Chest pain, shortness of breath, fever, chills, nausea, vomiting, diarrhea, change in mentation, falling, weakness, bleeding. Severe pain or pain not relieved by medications. Or, any other signs or symptoms that you may have questions about.       DISPOSITION:  x  Home With:   OT  PT  HH  RN       SNF/Inpatient Rehab/LTAC    Independent/assisted living    Hospice    Other:     CDMP Checked:   Yes x     PROBLEM LIST Updated:  Yes x       Signed:   Franklin Barbour MD  9/8/2022  11:46 AM

## 2022-09-04 ENCOUNTER — APPOINTMENT (OUTPATIENT)
Dept: MRI IMAGING | Age: 27
DRG: 418 | End: 2022-09-04
Attending: HOSPITALIST
Payer: COMMERCIAL

## 2022-09-04 ENCOUNTER — APPOINTMENT (OUTPATIENT)
Dept: ULTRASOUND IMAGING | Age: 27
DRG: 418 | End: 2022-09-04
Attending: FAMILY MEDICINE
Payer: COMMERCIAL

## 2022-09-04 LAB
ALBUMIN SERPL-MCNC: 4 G/DL (ref 3.5–5)
ALBUMIN/GLOB SERPL: 1 {RATIO} (ref 1.1–2.2)
ALP SERPL-CCNC: 134 U/L (ref 45–117)
ALT SERPL-CCNC: 633 U/L (ref 12–78)
APAP SERPL-MCNC: <2 UG/ML (ref 10–30)
AST SERPL-CCNC: 197 U/L (ref 15–37)
BILIRUB DIRECT SERPL-MCNC: 3.3 MG/DL (ref 0–0.2)
BILIRUB DIRECT SERPL-MCNC: 3.8 MG/DL (ref 0–0.2)
BILIRUB SERPL-MCNC: 5 MG/DL (ref 0.2–1)
GLOBULIN SER CALC-MCNC: 4.1 G/DL (ref 2–4)
HAV IGM SER QL: NONREACTIVE
HBV CORE IGM SER QL: NONREACTIVE
HBV SURFACE AG SER QL: <0.1 INDEX
HBV SURFACE AG SER QL: NEGATIVE
HCV AB SERPL QL IA: NONREACTIVE
HIV 1+2 AB+HIV1 P24 AG SERPL QL IA: NONREACTIVE
HIV12 RESULT COMMENT, HHIVC: NORMAL
PROT SERPL-MCNC: 8.1 G/DL (ref 6.4–8.2)
SP1: NORMAL
SP2: NORMAL
SP3: NORMAL

## 2022-09-04 PROCEDURE — 74011250636 HC RX REV CODE- 250/636: Performed by: INTERNAL MEDICINE

## 2022-09-04 PROCEDURE — 87798 DETECT AGENT NOS DNA AMP: CPT

## 2022-09-04 PROCEDURE — 74011000250 HC RX REV CODE- 250: Performed by: FAMILY MEDICINE

## 2022-09-04 PROCEDURE — 80074 ACUTE HEPATITIS PANEL: CPT

## 2022-09-04 PROCEDURE — 94760 N-INVAS EAR/PLS OXIMETRY 1: CPT

## 2022-09-04 PROCEDURE — 86664 EPSTEIN-BARR NUCLEAR ANTIGEN: CPT

## 2022-09-04 PROCEDURE — 87529 HSV DNA AMP PROBE: CPT

## 2022-09-04 PROCEDURE — 74011000250 HC RX REV CODE- 250: Performed by: HOSPITALIST

## 2022-09-04 PROCEDURE — 80143 DRUG ASSAY ACETAMINOPHEN: CPT

## 2022-09-04 PROCEDURE — A9576 INJ PROHANCE MULTIPACK: HCPCS

## 2022-09-04 PROCEDURE — 74011250636 HC RX REV CODE- 250/636: Performed by: HOSPITALIST

## 2022-09-04 PROCEDURE — 86038 ANTINUCLEAR ANTIBODIES: CPT

## 2022-09-04 PROCEDURE — 36415 COLL VENOUS BLD VENIPUNCTURE: CPT

## 2022-09-04 PROCEDURE — 74011250636 HC RX REV CODE- 250/636: Performed by: FAMILY MEDICINE

## 2022-09-04 PROCEDURE — 86015 ACTIN ANTIBODY EACH: CPT

## 2022-09-04 PROCEDURE — 74011000258 HC RX REV CODE- 258: Performed by: HOSPITALIST

## 2022-09-04 PROCEDURE — 77030021566 MRI ABD W MRCP W WO CONT

## 2022-09-04 PROCEDURE — 74011000250 HC RX REV CODE- 250: Performed by: SURGERY

## 2022-09-04 PROCEDURE — 86694 HERPES SIMPLEX NES ANTBDY: CPT

## 2022-09-04 PROCEDURE — 76705 ECHO EXAM OF ABDOMEN: CPT

## 2022-09-04 PROCEDURE — 80076 HEPATIC FUNCTION PANEL: CPT

## 2022-09-04 PROCEDURE — 74011250636 HC RX REV CODE- 250/636

## 2022-09-04 PROCEDURE — 74011250636 HC RX REV CODE- 250/636: Performed by: SURGERY

## 2022-09-04 PROCEDURE — 74011250637 HC RX REV CODE- 250/637: Performed by: SURGERY

## 2022-09-04 PROCEDURE — 99221 1ST HOSP IP/OBS SF/LOW 40: CPT | Performed by: SURGERY

## 2022-09-04 PROCEDURE — 65270000029 HC RM PRIVATE

## 2022-09-04 PROCEDURE — 87389 HIV-1 AG W/HIV-1&-2 AB AG IA: CPT

## 2022-09-04 RX ORDER — METRONIDAZOLE 500 MG/100ML
500 INJECTION, SOLUTION INTRAVENOUS EVERY 12 HOURS
Status: DISCONTINUED | OUTPATIENT
Start: 2022-09-04 | End: 2022-09-07

## 2022-09-04 RX ORDER — SODIUM CHLORIDE 0.9 % (FLUSH) 0.9 %
5-40 SYRINGE (ML) INJECTION AS NEEDED
Status: DISCONTINUED | OUTPATIENT
Start: 2022-09-04 | End: 2022-09-08 | Stop reason: HOSPADM

## 2022-09-04 RX ORDER — SODIUM CHLORIDE 0.9 % (FLUSH) 0.9 %
5-40 SYRINGE (ML) INJECTION EVERY 8 HOURS
Status: DISCONTINUED | OUTPATIENT
Start: 2022-09-04 | End: 2022-09-08 | Stop reason: HOSPADM

## 2022-09-04 RX ORDER — MORPHINE SULFATE 2 MG/ML
1 INJECTION, SOLUTION INTRAMUSCULAR; INTRAVENOUS
Status: DISCONTINUED | OUTPATIENT
Start: 2022-09-04 | End: 2022-09-08 | Stop reason: HOSPADM

## 2022-09-04 RX ORDER — ONDANSETRON 4 MG/1
4 TABLET, ORALLY DISINTEGRATING ORAL
Status: DISCONTINUED | OUTPATIENT
Start: 2022-09-04 | End: 2022-09-08 | Stop reason: HOSPADM

## 2022-09-04 RX ORDER — SODIUM CHLORIDE 9 MG/ML
125 INJECTION, SOLUTION INTRAVENOUS CONTINUOUS
Status: DISCONTINUED | OUTPATIENT
Start: 2022-09-04 | End: 2022-09-04

## 2022-09-04 RX ORDER — SODIUM CHLORIDE 0.9 % (FLUSH) 0.9 %
10 SYRINGE (ML) INJECTION
Status: COMPLETED | OUTPATIENT
Start: 2022-09-04 | End: 2022-09-04

## 2022-09-04 RX ORDER — HYDROMORPHONE HYDROCHLORIDE 1 MG/ML
1 INJECTION, SOLUTION INTRAMUSCULAR; INTRAVENOUS; SUBCUTANEOUS
Status: DISCONTINUED | OUTPATIENT
Start: 2022-09-04 | End: 2022-09-04

## 2022-09-04 RX ORDER — OXYCODONE HYDROCHLORIDE 5 MG/1
5 TABLET ORAL
Status: DISCONTINUED | OUTPATIENT
Start: 2022-09-04 | End: 2022-09-08 | Stop reason: HOSPADM

## 2022-09-04 RX ORDER — ONDANSETRON 2 MG/ML
4 INJECTION INTRAMUSCULAR; INTRAVENOUS EVERY 8 HOURS
Status: DISCONTINUED | OUTPATIENT
Start: 2022-09-04 | End: 2022-09-08 | Stop reason: HOSPADM

## 2022-09-04 RX ORDER — ONDANSETRON 2 MG/ML
4 INJECTION INTRAMUSCULAR; INTRAVENOUS
Status: DISCONTINUED | OUTPATIENT
Start: 2022-09-04 | End: 2022-09-08 | Stop reason: HOSPADM

## 2022-09-04 RX ORDER — POLYETHYLENE GLYCOL 3350 17 G/17G
17 POWDER, FOR SOLUTION ORAL DAILY PRN
Status: DISCONTINUED | OUTPATIENT
Start: 2022-09-04 | End: 2022-09-08 | Stop reason: HOSPADM

## 2022-09-04 RX ORDER — ONDANSETRON 2 MG/ML
4 INJECTION INTRAMUSCULAR; INTRAVENOUS
Status: DISCONTINUED | OUTPATIENT
Start: 2022-09-04 | End: 2022-09-04

## 2022-09-04 RX ORDER — OXYCODONE HYDROCHLORIDE 5 MG/1
10 TABLET ORAL
Status: DISCONTINUED | OUTPATIENT
Start: 2022-09-04 | End: 2022-09-08 | Stop reason: HOSPADM

## 2022-09-04 RX ORDER — ACETAMINOPHEN 500 MG
1000 TABLET ORAL
Status: DISCONTINUED | OUTPATIENT
Start: 2022-09-04 | End: 2022-09-08 | Stop reason: HOSPADM

## 2022-09-04 RX ORDER — HEPARIN SODIUM 5000 [USP'U]/ML
5000 INJECTION, SOLUTION INTRAVENOUS; SUBCUTANEOUS EVERY 12 HOURS
Status: DISCONTINUED | OUTPATIENT
Start: 2022-09-04 | End: 2022-09-05

## 2022-09-04 RX ORDER — DIPHENHYDRAMINE HYDROCHLORIDE 50 MG/ML
12.5 INJECTION, SOLUTION INTRAMUSCULAR; INTRAVENOUS
Status: DISCONTINUED | OUTPATIENT
Start: 2022-09-04 | End: 2022-09-04

## 2022-09-04 RX ORDER — SODIUM CHLORIDE 450 MG/100ML
75 INJECTION, SOLUTION INTRAVENOUS CONTINUOUS
Status: DISCONTINUED | OUTPATIENT
Start: 2022-09-04 | End: 2022-09-06

## 2022-09-04 RX ADMIN — DIPHENHYDRAMINE HYDROCHLORIDE 12.5 MG: 50 INJECTION INTRAMUSCULAR; INTRAVENOUS at 10:58

## 2022-09-04 RX ADMIN — ONDANSETRON 4 MG: 2 INJECTION INTRAMUSCULAR; INTRAVENOUS at 04:39

## 2022-09-04 RX ADMIN — SODIUM CHLORIDE 75 ML/HR: 4.5 INJECTION, SOLUTION INTRAVENOUS at 10:52

## 2022-09-04 RX ADMIN — ONDANSETRON 4 MG: 4 TABLET, ORALLY DISINTEGRATING ORAL at 13:44

## 2022-09-04 RX ADMIN — SODIUM CHLORIDE, PRESERVATIVE FREE 10 ML: 5 INJECTION INTRAVENOUS at 13:45

## 2022-09-04 RX ADMIN — SODIUM CHLORIDE, PRESERVATIVE FREE 10 ML: 5 INJECTION INTRAVENOUS at 14:00

## 2022-09-04 RX ADMIN — CEFAZOLIN 2 G: 1 INJECTION, POWDER, FOR SOLUTION INTRAMUSCULAR; INTRAVENOUS at 22:23

## 2022-09-04 RX ADMIN — SODIUM CHLORIDE 100 ML: 900 INJECTION, SOLUTION INTRAVENOUS at 14:00

## 2022-09-04 RX ADMIN — SODIUM CHLORIDE, PRESERVATIVE FREE 10 ML: 5 INJECTION INTRAVENOUS at 11:00

## 2022-09-04 RX ADMIN — CEFAZOLIN 2 G: 1 INJECTION, POWDER, FOR SOLUTION INTRAMUSCULAR; INTRAVENOUS at 15:29

## 2022-09-04 RX ADMIN — ONDANSETRON HYDROCHLORIDE 4 MG: 2 SOLUTION INTRAMUSCULAR; INTRAVENOUS at 20:34

## 2022-09-04 RX ADMIN — OXYCODONE 10 MG: 5 TABLET ORAL at 21:50

## 2022-09-04 RX ADMIN — HYDROMORPHONE HYDROCHLORIDE 1 MG: 1 INJECTION, SOLUTION INTRAMUSCULAR; INTRAVENOUS; SUBCUTANEOUS at 04:39

## 2022-09-04 RX ADMIN — HEPARIN SODIUM 5000 UNITS: 5000 INJECTION, SOLUTION INTRAVENOUS; SUBCUTANEOUS at 22:23

## 2022-09-04 RX ADMIN — GADOTERIDOL 20 ML: 279.3 INJECTION, SOLUTION INTRAVENOUS at 13:49

## 2022-09-04 RX ADMIN — ONDANSETRON HYDROCHLORIDE 4 MG: 2 SOLUTION INTRAMUSCULAR; INTRAVENOUS at 18:38

## 2022-09-04 RX ADMIN — MORPHINE SULFATE 1 MG: 2 INJECTION, SOLUTION INTRAMUSCULAR; INTRAVENOUS at 10:58

## 2022-09-04 RX ADMIN — METRONIDAZOLE 500 MG: 500 INJECTION, SOLUTION INTRAVENOUS at 15:29

## 2022-09-04 NOTE — CONSULTS
[address] [id number] [address]  [phone]        GASTROENTEROLOGY  CONSULTATION NOTE      NAME:  Flash Harris   :   1995   MRN:   [id number]     Referring Physician: Kayla Sierra MD     Date of Admission: 9/3/2022  Consult Date: 2022     Chief Complaint:   Chief Complaint   Patient presents with    LOW BACK PAIN     Pt c/o low back pain x 5d, abdominal pain, nausea       Reason for Consult: Elevated liver enzymes    Assessment:   Elevated liver enzymes: Presenting symptoms do appear to be consistent with cholecystitis, however no evidence of biliary obstruction or even clear evidence of cholecystitis. Could have passed stones/sludge but liver enzymes appear to be worse. Ultrasound does mention hepatic parenchymal changes. I wonder if this is more of acute hepatitis. Discussed with Dr. Aguilar Landis and agree not a clear cut picture. For now eventual plan for cholecystectomy but might wait till additional work up. In the meantime plan to continue supportive care and antibiotics. Cholecystitis: As above. No clear evidence of imaging. RUQ abdominal pain: As above. Nausea: Persistent. Could be medication related in addition to underlying hepatitis/cholecystitis. Recommendations:   Continue supportive care/antibiotics  Try scheduled zofran (will order)  No indication for ERCP at this time  Hepatitis work up ordered       Thank you for allowing us to participate in the care of this patient. Please do not hesitate to contact us with any further questions/concerns. Michael Pugh MD  [facility]      ------------------------------------------------------------------------------------------------------------------    History of Present Illness:  Patient is a 24 y.o. with past medical history significant for PCOS who presented with RUQ pain and nausea and we were consulted for above reason.      History obtained from chart review, patient and mother at bedside    Patient reports having intermittent bouts of RUQ and mid abdominal pain over last 2-3 yrs. Has been to the ER at [facility] in the past and at that time recalls being told that her gall bladder was normal, despite seeing stones and liver enzymes were normal. Reports current symptoms started about 2 weeks back with RUQ pain radiating to her back, associated with nausea and worse with eating anything. Noticed her eyes turned yellow which was new for her and considering her mother is a nurse and she also works in health care they got worried and came to the hospital. Labs showed significantly elevated liver enzymes. CT showed gall stones without any evidence of cholecystitis or biliary dilation. Subsequent ultrasound again showed cholelithiasis but no signs of gall bladder inflammation. Maybeury sign was positive. Eventually and MRI/MRCP was performed, however patient reports she vomited and could not be completed. The preliminary report does mention distended gall bladder, however again no clear evidence of cholecystitis. No mention of bile ducts so far. Patient denies any excessive alcohol use. Reports taking about total 4 pills of tylenol and 4 of naproxen over the last 2 weeks. No OTC meds or herbal medications. No illicit drug use or recent exposures. No family h/o liver disease. Mother does have lupus. Patient was tested and had positive RF. This was in 2019. She has PCOS but does not use an OCPs. Has had an IUD. Reports being on lisinopril/HCTZ for quite some time. Review of Systems:  A detailed 10 system ROS is obtained, with pertinent positives as listed in the HPI and PMH. All others are negative. Objective:   /84 (BP 1 Location: Right upper arm, BP Patient Position: At rest;Lying)   Pulse 78   Temp 98.4 °F (36.9 °C)   Resp 17   SpO2 97%     Physical Exam    General: Alert, cooperative, no acute distress    HEENT: Atraumatic. PERRLA, EOMI.  Anicteric sclerae. Lungs:  Comfortable breathing. No obvious use of accessory muscles. Not on oxygen  Abdomen: Soft, Non distended, Non tender. Positive Bowel sounds, no hepatosplenomegaly  Extremities: No cyanosis or edema  Neurologic:  CN 2-12 grossly intact, Alert and oriented X 3. No acute neurological distress   Psych:    Good insight. Not anxious nor agitated. Data Review      2/7/22 14:24 9/3/22 12:50 9/4/22 15:48   Bilirubin, total 0.9 4.6 (H) 5.0 (H)   Bilirubin, direct  3.3 (H) 3.8 (H)   Protein, total 9.0 (H) 7.6 8.1   Albumin 4.3 3.9 4.0   Globulin 4.7 (H) 3.7 4.1 (H)   A-G Ratio 0.9 (L) 1.1 1.0 (L)   ALT 30 809 (H) 633 (H)   AST 26 389 (H) 197 (H)   Alk. phosphatase 69 120 (H) 134 (H)   Lipase 91 129       9/3/22 12:50   Sodium 140   Potassium 3.7   Chloride 103   CO2 27   Anion gap 10   Glucose 98   BUN 5 (L)   Creatinine 0.73   BUN/Creatinine ratio 7 (L)   Calcium 8.3 (L)   GFR est non-AA >60      9/3/22 12:50   WBC 3.9   HGB 13.2   HCT 38.8   MCV 83.1   PLATELET 718     Imaging studies: Reviewed    09/04: MRI/MRCP:    *PRELIMINARY REPORT*     1.  Study limited by motion. 2.  Cholelithiasis and distended gallbladder. No specific evidence of acute  cholecystitis on MRI, but there was evidence of acute cholecystitis on  ultrasound performed earlier the same day. 3.  Absent left kidney. 09/04: US abd:    Liver: Heterogeneous and mildly hyperechoic. No focal liver lesion. Main portal vein flow: Toward the liver. Fluid: No ascites. Gallbladder: Distended. Cholelithiasis. No gallbladder wall thickening or  pericholecystic fluid. Positive sonographic Hastings sign. Tenderness to  transducer palpation. Bile ducts: There is no intra or extrahepatic biliary ductal dilatation. The  common bile duct measures 6 mm. Pancreas: The visualized portions are within normal limits. 09/03: CT abd/pelvis with contrast:    1. No acute abdominal or pelvic pathology. 2. Cholelithiasis.  No biliary ductal dilatation. 3. IUD within the uterus. Prior Endoscopy: None    Past Medical History:   Diagnosis Date    Congenital absence of one kidney     Hidradenitis suppurativa     Hypertension     Obesity        Past Surgical History:   Procedure Laterality Date    HX ADENOIDECTOMY      HX LIPOSUCTION  04/2019    HX OTHER SURGICAL  04/2019    butt lift    HX OTHER SURGICAL  01/2020    tummy tuck    HX TONSILLECTOMY         Allergies:  No Known Allergies    Medications Prior to Admission   Medication Sig    lisinopril-hydroCHLOROthiazide (PRINZIDE, ZESTORETIC) 10-12.5 mg per tablet Take 1 Tab by mouth daily.        Current Facility-Administered Medications   Medication Dose Route Frequency    sodium chloride (NS) flush 5-40 mL  5-40 mL IntraVENous Q8H    sodium chloride (NS) flush 5-40 mL  5-40 mL IntraVENous PRN    polyethylene glycol (MIRALAX) packet 17 g  17 g Oral DAILY PRN    morphine injection 1 mg  1 mg IntraVENous Q4H PRN    heparin (porcine) injection 5,000 Units  5,000 Units SubCUTAneous Q12H    0.45% sodium chloride infusion  75 mL/hr IntraVENous CONTINUOUS    acetaminophen (TYLENOL) tablet 1,000 mg  1,000 mg Oral Q6H PRN    oxyCODONE IR (ROXICODONE) tablet 5 mg  5 mg Oral Q4H PRN    oxyCODONE IR (ROXICODONE) tablet 10 mg  10 mg Oral Q4H PRN    ondansetron (ZOFRAN ODT) tablet 4 mg  4 mg Oral Q6H PRN    ceFAZolin (ANCEF) 2 g in sterile water (preservative free) 20 mL IV syringe  2 g IntraVENous Q8H    metroNIDAZOLE (FLAGYL) IVPB premix 500 mg  500 mg IntraVENous Q12H       Social History     Tobacco Use    Smoking status: Never    Smokeless tobacco: Never   Substance Use Topics    Alcohol use: No       Family History:  Family History   Problem Relation Age of Onset    Hypertension Mother     Lupus Mother     Diabetes Father     Diabetes Maternal Grandmother     Hypertension Maternal Grandmother     Diabetes Paternal Grandmother

## 2022-09-04 NOTE — ED NOTES
Patient transported to [facility] with [facility] ambulance transport service.

## 2022-09-04 NOTE — PROGRESS NOTES
[facility] Adult  Hospitalist Group                                                                                          Hospitalist Progress Note  Jordyn Curiel MD  Answering service: [phone] OR [phone] from in house phone        Date of Service:  2022  NAME:  Esthela Peng  :  1995  MRN:  [id number]      Admission Summary:   Esthela Peng is a 24 y.o. female with past medical history of hypertension, congenital solitary kidney, hidradenitis suppurativa presented as a direct admission/ transfer from [facility] ED ([facility] to [facility]) with chief complaints of back pain, abdominal pain, nausea and dark urine. Symptom worsened yesterday but has been ongoing \"on and off\" for several months with RUQ pain radiating to \"middle of back\", constant, severe, dull, aching, aggravated ~ 1 hour after eating certain foods such as diary, salads, and bread, increased with deep breathing, associated with nausea and dark urine. On arrival at [address] [place] [address], workup included labs showing , , Alkaline phosphatase 120, total bilirubin 4.6. CT abdomen pelvis with IV no acute abdominal or pelvic pathology with cholelithiasis. Patient is now seen on transfer to [facility] for admission to the hospitalist service. Patient was given Dilaudid IV earlier tonight but she now reports itching. Interval history / Subjective:    Follow up abdominal pain     Assessment & Plan:     Cholelithiasis  Hyperbilirubinemia/transaminitis  Abdominal pain  -symptomatic cholelithiasis  -order HIDA scan  -US abd awaited  -Awaiting Surgery/GI    Hypertension: home meds  Obesity:BMI 34.46 kg/m2: counseling once more stable     NPO     Code status: FULL CODE  Prophylaxis: heparin    Plan: Follow GI, surgery  Care Plan discussed with: Patient  Anticipated Disposition: in 1-2 days     Hospital Problems  Date Reviewed: 2020            Codes Class Noted POA    Choledocholithiasis ICD-10-CM: K80.50  ICD-9-CM: 574.50  9/3/2022 Unknown             Review of Systems:   A comprehensive review of systems was negative except for that written in the HPI. Vital Signs:    Last 24hrs VS reviewed since prior progress note. Most recent are:  Visit Vitals  /75 (BP 1 Location: Right upper arm, BP Patient Position: At rest;Sitting)   Pulse 80   Temp 98 °F (36.7 °C)   Resp 17   SpO2 97%         Intake/Output Summary (Last 24 hours) at 9/4/2022 0931  Last data filed at 9/4/2022 0400  Gross per 24 hour   Intake 0 ml   Output --   Net 0 ml        Physical Examination:     I had a face to face encounter with this patient and independently examined them on 9/4/2022 as outlined below:          Constitutional:  No acute distress   ENT:  Oral mucosa moist, oropharynx benign. Resp:  CTA bilaterally. No wheezing/rhonchi/rales. No accessory muscle use. CV:  Regular rhythm, normal rate, no murmurs, gallops, rubs    GI:  Soft, non distended, non tender. normoactive bowel sounds, no hepatosplenomegaly     Musculoskeletal:  No edema, warm, 2+ pulses throughout    Neurologic:  Moves all extremities. AAOx3, CN II-XII reviewed            Data Review:    Review and/or order of clinical lab test      Labs:     Recent Labs     09/03/22  1250   WBC 3.9   HGB 13.2   HCT 38.8        Recent Labs     09/03/22  1250      K 3.7      CO2 27   BUN 5*   CREA 0.73   GLU 98   CA 8.3*     Recent Labs     09/03/22  1250   *   *   TBILI 4.6*   TP 7.6   ALB 3.9   GLOB 3.7   LPSE 129     No results for input(s): INR, PTP, APTT, INREXT in the last 72 hours. No results for input(s): FE, TIBC, PSAT, FERR in the last 72 hours. No results found for: FOL, RBCF   No results for input(s): PH, PCO2, PO2 in the last 72 hours. No results for input(s): CPK, CKNDX, TROIQ in the last 72 hours.     No lab exists for component: CPKMB  Lab Results   Component Value Date/Time    Cholesterol, total 248 (H) 08/30/2017 01:39 PM    HDL Cholesterol 48 08/30/2017 01:39 PM    LDL, calculated 168 (H) 08/30/2017 01:39 PM    Triglyceride 161 (H) 08/30/2017 01:39 PM     No results found for: [facility]  Lab Results   Component Value Date/Time    Color DARK YELLOW 09/03/2022 02:06 PM    Appearance HAZY (A) 09/03/2022 02:06 PM    Specific gravity 1.020 09/03/2022 02:06 PM    Specific gravity 1.024 02/07/2022 01:23 PM    pH (UA) 6.0 09/03/2022 02:06 PM    Protein Negative 09/03/2022 02:06 PM    Glucose Negative 09/03/2022 02:06 PM    Ketone Negative 09/03/2022 02:06 PM    Bilirubin Negative 02/07/2022 01:23 PM    Urobilinogen 1.0 09/03/2022 02:06 PM    Nitrites Negative 09/03/2022 02:06 PM    Leukocyte Esterase SMALL (A) 09/03/2022 02:06 PM    Epithelial cells MANY (A) 09/03/2022 02:06 PM    Bacteria Negative 09/03/2022 02:06 PM    WBC 5-10 09/03/2022 02:06 PM    RBC 0-5 09/03/2022 02:06 PM         Medications Reviewed:     Current Facility-Administered Medications   Medication Dose Route Frequency    ondansetron (ZOFRAN) injection 4 mg  4 mg IntraVENous Q4H PRN    sodium chloride (NS) flush 5-40 mL  5-40 mL IntraVENous Q8H    sodium chloride (NS) flush 5-40 mL  5-40 mL IntraVENous PRN    polyethylene glycol (MIRALAX) packet 17 g  17 g Oral DAILY PRN    0.9% sodium chloride infusion  125 mL/hr IntraVENous CONTINUOUS    morphine injection 1 mg  1 mg IntraVENous Q4H PRN    diphenhydrAMINE (BENADRYL) injection 12.5 mg  12.5 mg IntraVENous Q6H PRN     ______________________________________________________________________  EXPECTED LENGTH OF STAY: - - -  ACTUAL LENGTH OF STAY:          1                 Ric Wall MD

## 2022-09-04 NOTE — CONSULTS
Asked to see patient by Dr. Dasha Osorio. Rema Juarez is an 24 y.o. female who was recently admitted with symptomatic cholelithiasis and choledocholithiasis. Ms. Ekaterina Jain tells me that she has been experiencing intermittent RUQ abdominal pain for some time now. The episodes of pain are becoming more frequent and more severe. The pain occurs after meals and radiates to her back. Associated abdominal bloating and nausea. No emesis. No NSAID use. Ms. Ekaterina Jain has recently noted dark urine. No clear h/o marilou colored stool. No dysuria or hematuria. No shortness of breath or chest pain. She has otherwise been in her usual state of health. CT scan abdomen/pelvis with IV contrast - 9/3/2022 - No acute abdominal or pelvic pathology. Cholelithiasis. No biliary ductal dilatation. IUD within the uterus. Abdominal ultrasound - 9/4/2022 - Cholelithiasis with evidence of acute cholecystitis. Hepatic parenchymal disease. No right hydronephrosis. Allergies - Patient has no known allergies. Meds - Reviewed. PMH -   Past Medical History:   Diagnosis Date    Congenital absence of one kidney     Hidradenitis suppurativa     Hypertension     Obesity      PSH -   Past Surgical History:   Procedure Laterality Date    HX ADENOIDECTOMY      HX LIPOSUCTION  04/2019    HX OTHER SURGICAL  04/2019    butt lift    HX OTHER SURGICAL  01/2020    tummy tuck    HX TONSILLECTOMY       Fam Hx -   Family History   Problem Relation Age of Onset    Hypertension Mother     Lupus Mother     Diabetes Father     Diabetes Maternal Grandmother     Hypertension Maternal Grandmother     Diabetes Paternal Grandmother      Soc Hx -   Social History     Tobacco Use    Smoking status: Never    Smokeless tobacco: Never   Substance Use Topics    Alcohol use: No     Patient is an obese female in no acute distress. Tm 99.1 HR: 80 BP: 112/75 Resp Rate: 17 97% sat on room air. HEENT: Icteric. Neck: Supple without palpable lymphadenopathy.   Cor: RRR.  Lungs: Bilateral breath sounds. Clear to auscultation. Abd: Soft. Tender in RUQ. No guarding or rebound. Non distended. Ext: No edema. Neuro: Grossly Non focal.     Labs -   Recent Results (from the past 24 hour(s))   SAMPLES BEING HELD    Collection Time: 09/03/22 12:50 PM   Result Value Ref Range    SAMPLES BEING HELD GREEN, LAV, RED, BLUE     COMMENT        Add-on orders for these samples will be processed based on acceptable specimen integrity and analyte stability, which may vary by analyte. CBC WITH AUTOMATED DIFF    Collection Time: 09/03/22 12:50 PM   Result Value Ref Range    WBC 3.9 3.6 - 11.0 K/uL    RBC 4.67 3.80 - 5.20 M/uL    HGB 13.2 11.5 - 16.0 g/dL    HCT 38.8 35.0 - 47.0 %    MCV 83.1 80.0 - 99.0 FL    MCH 28.3 26.0 - 34.0 PG    MCHC 34.0 30.0 - 36.5 g/dL    RDW 12.5 11.5 - 14.5 %    PLATELET 600 229 - 922 K/uL    MPV 11.5 8.9 - 12.9 FL    NRBC 0.0 0  WBC    ABSOLUTE NRBC 0.00 0.00 - 0.01 K/uL    NEUTROPHILS 49 32 - 75 %    LYMPHOCYTES 33 12 - 49 %    MONOCYTES 11 5 - 13 %    EOSINOPHILS 6 0 - 7 %    BASOPHILS 1 0 - 1 %    IMMATURE GRANULOCYTES 0 0.0 - 0.5 %    ABS. NEUTROPHILS 2.0 1.8 - 8.0 K/UL    ABS. LYMPHOCYTES 1.3 0.8 - 3.5 K/UL    ABS. MONOCYTES 0.4 0.0 - 1.0 K/UL    ABS. EOSINOPHILS 0.2 0.0 - 0.4 K/UL    ABS. BASOPHILS 0.0 0.0 - 0.1 K/UL    ABS. IMM.  GRANS. 0.0 0.00 - 0.04 K/UL    DF AUTOMATED     METABOLIC PANEL, COMPREHENSIVE    Collection Time: 09/03/22 12:50 PM   Result Value Ref Range    Sodium 140 136 - 145 mmol/L    Potassium 3.7 3.5 - 5.1 mmol/L    Chloride 103 97 - 108 mmol/L    CO2 27 21 - 32 mmol/L    Anion gap 10 5 - 15 mmol/L    Glucose 98 65 - 100 mg/dL    BUN 5 (L) 6 - 20 MG/DL    Creatinine 0.73 0.55 - 1.02 MG/DL    BUN/Creatinine ratio 7 (L) 12 - 20      GFR est AA >60 >60 ml/min/1.73m2    GFR est non-AA >60 >60 ml/min/1.73m2    Calcium 8.3 (L) 8.5 - 10.1 MG/DL    Bilirubin, total 4.6 (H) 0.2 - 1.0 MG/DL    ALT (SGPT) 809 (H) 12 - 78 U/L    AST (SGOT) 389 (H) 15 - 37 U/L    Alk. phosphatase 120 (H) 45 - 117 U/L    Protein, total 7.6 6.4 - 8.2 g/dL    Albumin 3.9 3.5 - 5.0 g/dL    Globulin 3.7 2.0 - 4.0 g/dL    A-G Ratio 1.1 1.1 - 2.2     LIPASE    Collection Time: 09/03/22 12:50 PM   Result Value Ref Range    Lipase 129 73 - 393 U/L   BILIRUBIN, DIRECT    Collection Time: 09/03/22 12:50 PM   Result Value Ref Range    Bilirubin, direct 3.3 (H) 0.0 - 0.2 MG/DL   URINALYSIS W/MICROSCOPIC    Collection Time: 09/03/22  2:06 PM   Result Value Ref Range    Color DARK YELLOW      Appearance HAZY (A) CLEAR      Specific gravity 1.020 1.003 - 1.030      pH (UA) 6.0 5.0 - 8.0      Protein Negative NEG mg/dL    Glucose Negative NEG mg/dL    Ketone Negative NEG mg/dL    Blood Negative NEG      Urobilinogen 1.0 0.2 - 1.0 EU/dL    Nitrites Negative NEG      Leukocyte Esterase SMALL (A) NEG      WBC 5-10 0 - 4 /hpf    RBC 0-5 0 - 5 /hpf    Epithelial cells MANY (A) FEW /lpf    Bacteria Negative NEG /hpf   URINE CULTURE HOLD SAMPLE    Collection Time: 09/03/22  2:06 PM    Specimen: Serum; Urine   Result Value Ref Range    Urine culture hold        Urine on hold in Microbiology dept for 2 days. If unpreserved urine is submitted, it cannot be used for addtional testing after 24 hours, recollection will be required. BILIRUBIN, CONFIRM    Collection Time: 09/03/22  2:06 PM   Result Value Ref Range    Bilirubin UA, confirm Positive (A) NEG     HCG URINE, QL. - POC    Collection Time: 09/03/22  2:07 PM   Result Value Ref Range    Pregnancy test,urine (POC) Negative NEG       Imaging studies - Reviewed. Imp:  is a 24 y.o. female with symptomatic cholelithiasis and choledocholithiasis. Plan: 1. Clear liquid diet as tolerated. 2. IVF at 75 ml/hour as ordered. 3. MRCP today. 4. Do not believe that there is a need for HIDA scan. 5. GI to see. 6. Anti-emetics and pain medication as needed. 7. GI to see.     8. IV abx - Ancef and Meg. 9. Ms. Td Camejo will ultimately benefit from cholecystectomy. 10. Plans per Dr. Karthik Resendiz.

## 2022-09-04 NOTE — H&P
[facility]    History & Physical      Date of admission: 9/3/2022    Patient name: Josey Allen  MRN: [id number]  YOB: 1995  Age: 24 y.o. Primary care provider:  Ping Canales MD     Source of Information: patient, ED and electronic medical records                              Chief complaint: abdominal/ back pain    History of present illness  Josey Allen is a 24 y.o. female with past medical history of hypertension, congenital solitary kidney, hidradenitis suppurativa presented as a direct admission/ transfer from [facility] to [facility] with chief complaints of back pain, abdominal pain, nausea and dark urine. Symptom worsened yesterday but has been ongoing \"on and off\" for several months with RUQ pain radiating to \"middle of back\", constant, severe, dull, aching, aggravated ~ 1 hour after eating certain foods such as diary, salads, and bread, increased with deep breathing, associated with nausea and dark urine. On arrival at [facility], workup included labs showing , , Alkaline phosphatase 120, total bilirubin 4.6. CT abdomen pelvis with IV no acute abdominal or pelvic pathology with cholelithiasis. Patient is now seen on transfer to [facility] for admission to the hospitalist service. Patient was given Dilaudid IV earlier tonight but she now reports itching. Past Medical History:   Diagnosis Date    Congenital absence of one kidney     Hidradenitis suppurativa     Hypertension     Obesity       Past Surgical History:   Procedure Laterality Date    HX ADENOIDECTOMY      HX LIPOSUCTION  04/2019    HX OTHER SURGICAL  04/2019    butt lift    HX OTHER SURGICAL  01/2020    tummy tuck    HX TONSILLECTOMY       MEDICATIONS:  Prior to Admission medications    Medication Sig Start Date End Date Taking?  Authorizing Provider   lisinopril-hydroCHLOROthiazide (Duane Crane) 10-12.5 mg per tablet Take 1 Tab by mouth daily. 11/23/20   Chandler Goldman MD     ALLERGIES:  No Known Allergies      Social history  Patient resides  X  Independently                Ambulates  X  Independently                 Alcohol history   x  denies           Smoking history  x  denies             Illicit drugs:  denies      Family History   Problem Relation Age of Onset    Hypertension Mother     Lupus Mother     Diabetes Father     Diabetes Maternal Grandmother     Hypertension Maternal Grandmother     Diabetes Paternal Grandmother           Review of systems  Pertinent positives as noted in HPI. All other systems were reviewed and were negative     Physical Examination   Visit Vitals  /82 (BP 1 Location: Left upper arm, BP Patient Position: At rest)   Pulse 80   Temp 99.1 °F (37.3 °C)   Resp 19   SpO2 99%          O2 Device: None (Room air)    General:  Patient is in no acute respiratory distress. Head:  Normocephalic, without obvious abnormality, atraumatic   Eyes:  Conjunctivae/corneas jaundiced. Pupils 2 mm reactive bilateral.  Scleral icterus. E/N/M/T: Nares normal. Septum midline.  No nasal drainage or sinus tenderness  Tongue midline/ non-edematous  Clear oropharynx   Neck: Normal appearance and movements, symmetrical, trachea midline  No palpable adenopathy  No thyroid enlargement, tenderness or nodules  No carotid bruit   No JVD  Trachea midline   Lungs:   Symmetrical chest expansion and respiratory effort  Clear to auscultation bilaterally   Chest wall:  No tenderness or deformity   Heart:  Regular rate and rhythm   Normal S1 and S2; no murmur, click, rub or gallop   Abdomen:   Soft, RUQ/ RLQ tenderness  No rebound, guarding, or rigidity  Non-distended   Bowel sounds normal  No masses or hepatosplenomegaly  No hernias present   Back: No costovertebral angle tenderness  No step-off deformity   Extremities: Extremities normal, atraumatic  No cyanosis or edema     Vascular/  Pulses: 2+ radial/ DP bilateral pulses   Integument/  Skin: No rashes or ulcers  Warm and dry   Musculo-      skeletal: Gait not tested  Normal symmetry, ROM, strength and tone  No calf tenderness   Neuro: GCS 15. Moves all extremities x 4. No slurred speech. No facial droop. Sensation grossly intact. Psych: Alert, oriented x 3           I reviewed the following data:        24 Hour Results:  Recent Results (from the past 24 hour(s))   SAMPLES BEING HELD    Collection Time: 09/03/22 12:50 PM   Result Value Ref Range    SAMPLES BEING HELD GREEN, LAV, RED, BLUE     COMMENT        Add-on orders for these samples will be processed based on acceptable specimen integrity and analyte stability, which may vary by analyte. CBC WITH AUTOMATED DIFF    Collection Time: 09/03/22 12:50 PM   Result Value Ref Range    WBC 3.9 3.6 - 11.0 K/uL    RBC 4.67 3.80 - 5.20 M/uL    HGB 13.2 11.5 - 16.0 g/dL    HCT 38.8 35.0 - 47.0 %    MCV 83.1 80.0 - 99.0 FL    MCH 28.3 26.0 - 34.0 PG    MCHC 34.0 30.0 - 36.5 g/dL    RDW 12.5 11.5 - 14.5 %    PLATELET 390 144 - 761 K/uL    MPV 11.5 8.9 - 12.9 FL    NRBC 0.0 0  WBC    ABSOLUTE NRBC 0.00 0.00 - 0.01 K/uL    NEUTROPHILS 49 32 - 75 %    LYMPHOCYTES 33 12 - 49 %    MONOCYTES 11 5 - 13 %    EOSINOPHILS 6 0 - 7 %    BASOPHILS 1 0 - 1 %    IMMATURE GRANULOCYTES 0 0.0 - 0.5 %    ABS. NEUTROPHILS 2.0 1.8 - 8.0 K/UL    ABS. LYMPHOCYTES 1.3 0.8 - 3.5 K/UL    ABS. MONOCYTES 0.4 0.0 - 1.0 K/UL    ABS. EOSINOPHILS 0.2 0.0 - 0.4 K/UL    ABS. BASOPHILS 0.0 0.0 - 0.1 K/UL    ABS. IMM.  GRANS. 0.0 0.00 - 0.04 K/UL    DF AUTOMATED     METABOLIC PANEL, COMPREHENSIVE    Collection Time: 09/03/22 12:50 PM   Result Value Ref Range    Sodium 140 136 - 145 mmol/L    Potassium 3.7 3.5 - 5.1 mmol/L    Chloride 103 97 - 108 mmol/L    CO2 27 21 - 32 mmol/L    Anion gap 10 5 - 15 mmol/L    Glucose 98 65 - 100 mg/dL    BUN 5 (L) 6 - 20 MG/DL    Creatinine 0.73 0.55 - 1.02 MG/DL    BUN/Creatinine ratio 7 (L) 12 - 20      GFR est AA >60 >60 ml/min/1.73m2    GFR est non-AA >60 >60 ml/min/1.73m2    Calcium 8.3 (L) 8.5 - 10.1 MG/DL    Bilirubin, total 4.6 (H) 0.2 - 1.0 MG/DL    ALT (SGPT) 809 (H) 12 - 78 U/L    AST (SGOT) 389 (H) 15 - 37 U/L    Alk. phosphatase 120 (H) 45 - 117 U/L    Protein, total 7.6 6.4 - 8.2 g/dL    Albumin 3.9 3.5 - 5.0 g/dL    Globulin 3.7 2.0 - 4.0 g/dL    A-G Ratio 1.1 1.1 - 2.2     LIPASE    Collection Time: 09/03/22 12:50 PM   Result Value Ref Range    Lipase 129 73 - 393 U/L   URINALYSIS W/MICROSCOPIC    Collection Time: 09/03/22  2:06 PM   Result Value Ref Range    Color DARK YELLOW      Appearance HAZY (A) CLEAR      Specific gravity 1.020 1.003 - 1.030      pH (UA) 6.0 5.0 - 8.0      Protein Negative NEG mg/dL    Glucose Negative NEG mg/dL    Ketone Negative NEG mg/dL    Blood Negative NEG      Urobilinogen 1.0 0.2 - 1.0 EU/dL    Nitrites Negative NEG      Leukocyte Esterase SMALL (A) NEG      WBC 5-10 0 - 4 /hpf    RBC 0-5 0 - 5 /hpf    Epithelial cells MANY (A) FEW /lpf    Bacteria Negative NEG /hpf   URINE CULTURE HOLD SAMPLE    Collection Time: 09/03/22  2:06 PM    Specimen: Serum; Urine   Result Value Ref Range    Urine culture hold        Urine on hold in Microbiology dept for 2 days. If unpreserved urine is submitted, it cannot be used for addtional testing after 24 hours, recollection will be required.    BILIRUBIN, CONFIRM    Collection Time: 09/03/22  2:06 PM   Result Value Ref Range    Bilirubin UA, confirm Positive (A) NEG     HCG URINE, QL. - POC    Collection Time: 09/03/22  2:07 PM   Result Value Ref Range    Pregnancy test,urine (POC) Negative NEG       Recent Labs     09/03/22  1250   WBC 3.9   HGB 13.2   HCT 38.8        Recent Labs     09/03/22  1250      K 3.7      CO2 27   GLU 98   BUN 5*   CREA 0.73   CA 8.3*   ALB 3.9   TBILI 4.6*   *       Imaging    CT ABD PELV W CONT     INDICATION: right uper and mid abdominal pain     COMPARISON: CT February 2022      CONTRAST: 100 mL of Isovue-370. ORAL CONTRAST: Not given     TECHNIQUE:   Following the uneventful intravenous administration of contrast, thin axial  images were obtained through the abdomen and pelvis. Coronal and sagittal  reconstructions were generated. CT dose reduction was achieved through use of a  standardized protocol tailored for this examination and automatic exposure  control for dose modulation. FINDINGS:   LOWER THORAX: No significant abnormality in the incidentally imaged lower chest.  LIVER: No mass. BILIARY TREE: Gallstones are noted in the gallbladder. CBD is not dilated. SPLEEN: within normal limits. PANCREAS: No mass or ductal dilatation. ADRENALS: Unremarkable. KIDNEYS: Congenitally absent/severely atrophic left kidney with compensatory  hypertrophy of the right kidney. No hydronephrosis. STOMACH: Unremarkable. SMALL BOWEL: No dilatation or wall thickening. COLON: No dilatation or wall thickening. APPENDIX: Normal  PERITONEUM: No ascites or pneumoperitoneum. RETROPERITONEUM: No lymphadenopathy or aortic aneurysm. REPRODUCTIVE ORGANS: Uterus is noted and contains an intrauterine device. There  is no significant pelvic free fluid. URINARY BLADDER: No mass or calculus. BONES: No destructive bone lesion. ABDOMINAL WALL: No mass or hernia. ADDITIONAL COMMENTS: N/A     IMPRESSION     1. No acute abdominal or pelvic pathology. 2. Cholelithiasis. No biliary ductal dilatation. 3. IUD within the uterus. Assessment and Plan     1. Choledocholithiasis  -symptomatic cholelithiasis  -consult general surgeon  -order HIDA scan    2. Hyperbilirubinemia  -repeat total and direct bilirubin levels  -order RUQ ultrasound  -consult GI    3. Elevated LFTs  -plan as above. Repeat CMP.     4. Abdominal pain  -RUQ/ right flank/ lower back pain  -initially ordered Dilaudid 1 mg IV q 4 hours prn for severe pain but may change to Morphine as she reports for adverse reaction to the same  -order Benadryl for itching    5. Hypertension  -provide BP medications for the same  -monitor BP closely    6. Jaundice  -plan as above    7. Obesity  -BMI 34.46 kg/m2  -would recommend eventual weight loss, dietary and lifestyle modifications    8. VTE prophylaxis  -SCDs to BLEs    ADVANCED DIRECTIVE/ CODE STATUS:  FULL CODE as per discussion with patient.            Signed by: Eileen Andres MD    September 4, 2022 at 6:33 AM

## 2022-09-05 ENCOUNTER — ANESTHESIA (OUTPATIENT)
Dept: SURGERY | Age: 27
DRG: 418 | End: 2022-09-05
Payer: COMMERCIAL

## 2022-09-05 ENCOUNTER — APPOINTMENT (OUTPATIENT)
Dept: GENERAL RADIOLOGY | Age: 27
DRG: 418 | End: 2022-09-05
Attending: SURGERY
Payer: COMMERCIAL

## 2022-09-05 ENCOUNTER — APPOINTMENT (OUTPATIENT)
Dept: GENERAL RADIOLOGY | Age: 27
DRG: 418 | End: 2022-09-05
Attending: NURSE PRACTITIONER
Payer: COMMERCIAL

## 2022-09-05 ENCOUNTER — ANESTHESIA EVENT (OUTPATIENT)
Dept: SURGERY | Age: 27
DRG: 418 | End: 2022-09-05
Payer: COMMERCIAL

## 2022-09-05 LAB
ALBUMIN SERPL-MCNC: 3.2 G/DL (ref 3.5–5)
ALBUMIN SERPL-MCNC: 4.1 G/DL (ref 3.5–5)
ALBUMIN/GLOB SERPL: 0.9 {RATIO} (ref 1.1–2.2)
ALBUMIN/GLOB SERPL: 0.9 {RATIO} (ref 1.1–2.2)
ALP SERPL-CCNC: 113 U/L (ref 45–117)
ALP SERPL-CCNC: 151 U/L (ref 45–117)
ALT SERPL-CCNC: 476 U/L (ref 12–78)
ALT SERPL-CCNC: 498 U/L (ref 12–78)
ANION GAP SERPL CALC-SCNC: 10 MMOL/L (ref 5–15)
ANION GAP SERPL CALC-SCNC: 5 MMOL/L (ref 5–15)
APTT PPP: 28.3 SEC (ref 22.1–31)
AST SERPL-CCNC: 140 U/L (ref 15–37)
AST SERPL-CCNC: 186 U/L (ref 15–37)
BASOPHILS # BLD: 0 K/UL (ref 0–0.1)
BASOPHILS NFR BLD: 1 % (ref 0–1)
BILIRUB DIRECT SERPL-MCNC: 2.3 MG/DL (ref 0–0.2)
BILIRUB SERPL-MCNC: 3.5 MG/DL (ref 0.2–1)
BILIRUB SERPL-MCNC: 4.9 MG/DL (ref 0.2–1)
BUN SERPL-MCNC: 5 MG/DL (ref 6–20)
BUN SERPL-MCNC: 7 MG/DL (ref 6–20)
BUN/CREAT SERPL: 6 (ref 12–20)
BUN/CREAT SERPL: 6 (ref 12–20)
CALCIUM SERPL-MCNC: 8.7 MG/DL (ref 8.5–10.1)
CALCIUM SERPL-MCNC: 9.7 MG/DL (ref 8.5–10.1)
CHLORIDE SERPL-SCNC: 102 MMOL/L (ref 97–108)
CHLORIDE SERPL-SCNC: 106 MMOL/L (ref 97–108)
CO2 SERPL-SCNC: 21 MMOL/L (ref 21–32)
CO2 SERPL-SCNC: 24 MMOL/L (ref 21–32)
COMMENT, HOLDF: NORMAL
CREAT SERPL-MCNC: 0.8 MG/DL (ref 0.55–1.02)
CREAT SERPL-MCNC: 1.15 MG/DL (ref 0.55–1.02)
DIFFERENTIAL METHOD BLD: ABNORMAL
EOSINOPHIL # BLD: 0.2 K/UL (ref 0–0.4)
EOSINOPHIL NFR BLD: 6 % (ref 0–7)
ERYTHROCYTE [DISTWIDTH] IN BLOOD BY AUTOMATED COUNT: 12.1 % (ref 11.5–14.5)
GLOBULIN SER CALC-MCNC: 3.7 G/DL (ref 2–4)
GLOBULIN SER CALC-MCNC: 4.8 G/DL (ref 2–4)
GLUCOSE SERPL-MCNC: 107 MG/DL (ref 65–100)
GLUCOSE SERPL-MCNC: 88 MG/DL (ref 65–100)
HCT VFR BLD AUTO: 38.6 % (ref 35–47)
HGB BLD-MCNC: 13.2 G/DL (ref 11.5–16)
IMM GRANULOCYTES # BLD AUTO: 0 K/UL (ref 0–0.04)
IMM GRANULOCYTES NFR BLD AUTO: 0 % (ref 0–0.5)
INR PPP: 1 (ref 0.9–1.1)
LYMPHOCYTES # BLD: 1.1 K/UL (ref 0.8–3.5)
LYMPHOCYTES NFR BLD: 35 % (ref 12–49)
MAGNESIUM SERPL-MCNC: 2.1 MG/DL (ref 1.6–2.4)
MCH RBC QN AUTO: 28.6 PG (ref 26–34)
MCHC RBC AUTO-ENTMCNC: 34.2 G/DL (ref 30–36.5)
MCV RBC AUTO: 83.5 FL (ref 80–99)
MONOCYTES # BLD: 0.5 K/UL (ref 0–1)
MONOCYTES NFR BLD: 16 % (ref 5–13)
NEUTS SEG # BLD: 1.3 K/UL (ref 1.8–8)
NEUTS SEG NFR BLD: 42 % (ref 32–75)
NRBC # BLD: 0 K/UL (ref 0–0.01)
NRBC BLD-RTO: 0 PER 100 WBC
PLATELET # BLD AUTO: 193 K/UL (ref 150–400)
PMV BLD AUTO: 11.2 FL (ref 8.9–12.9)
POTASSIUM SERPL-SCNC: 3.6 MMOL/L (ref 3.5–5.1)
POTASSIUM SERPL-SCNC: 3.6 MMOL/L (ref 3.5–5.1)
PROT SERPL-MCNC: 6.9 G/DL (ref 6.4–8.2)
PROT SERPL-MCNC: 8.9 G/DL (ref 6.4–8.2)
PROTHROMBIN TIME: 10.7 SEC (ref 9–11.1)
RBC # BLD AUTO: 4.62 M/UL (ref 3.8–5.2)
SAMPLES BEING HELD,HOLD: NORMAL
SODIUM SERPL-SCNC: 133 MMOL/L (ref 136–145)
SODIUM SERPL-SCNC: 135 MMOL/L (ref 136–145)
THERAPEUTIC RANGE,PTTT: NORMAL SECS (ref 58–77)
TROPONIN-HIGH SENSITIVITY: <4 NG/L (ref 0–51)
WBC # BLD AUTO: 3.1 K/UL (ref 3.6–11)

## 2022-09-05 PROCEDURE — 93005 ELECTROCARDIOGRAM TRACING: CPT

## 2022-09-05 PROCEDURE — 77030010031 HC SCIS ENDOSC MPLR J&J -C: Performed by: SURGERY

## 2022-09-05 PROCEDURE — 80053 COMPREHEN METABOLIC PANEL: CPT

## 2022-09-05 PROCEDURE — 74011000250 HC RX REV CODE- 250: Performed by: SURGERY

## 2022-09-05 PROCEDURE — 82248 BILIRUBIN DIRECT: CPT

## 2022-09-05 PROCEDURE — 85025 COMPLETE CBC W/AUTO DIFF WBC: CPT

## 2022-09-05 PROCEDURE — 77030008602 HC TRCR ENDOSC EPATH J&J -B: Performed by: SURGERY

## 2022-09-05 PROCEDURE — 77030010507 HC ADH SKN DERMBND J&J -B: Performed by: SURGERY

## 2022-09-05 PROCEDURE — 74011250636 HC RX REV CODE- 250/636: Performed by: NURSE ANESTHETIST, CERTIFIED REGISTERED

## 2022-09-05 PROCEDURE — 74011250636 HC RX REV CODE- 250/636: Performed by: SURGERY

## 2022-09-05 PROCEDURE — 76010000153 HC OR TIME 1.5 TO 2 HR: Performed by: SURGERY

## 2022-09-05 PROCEDURE — 77030008684 HC TU ET CUF COVD -B: Performed by: STUDENT IN AN ORGANIZED HEALTH CARE EDUCATION/TRAINING PROGRAM

## 2022-09-05 PROCEDURE — 76210000063 HC OR PH I REC FIRST 0.5 HR: Performed by: SURGERY

## 2022-09-05 PROCEDURE — 85610 PROTHROMBIN TIME: CPT

## 2022-09-05 PROCEDURE — 77030020053 HC ELECTRD LAPSCP COVD -B: Performed by: SURGERY

## 2022-09-05 PROCEDURE — 77030038157 HC DEV PWR CNTR DISP SIGNIA COVD -C: Performed by: SURGERY

## 2022-09-05 PROCEDURE — 99232 SBSQ HOSP IP/OBS MODERATE 35: CPT | Performed by: SURGERY

## 2022-09-05 PROCEDURE — 74011250636 HC RX REV CODE- 250/636: Performed by: HOSPITALIST

## 2022-09-05 PROCEDURE — 74011250637 HC RX REV CODE- 250/637: Performed by: SURGERY

## 2022-09-05 PROCEDURE — 77030013079 HC BLNKT BAIR HGGR 3M -A: Performed by: STUDENT IN AN ORGANIZED HEALTH CARE EDUCATION/TRAINING PROGRAM

## 2022-09-05 PROCEDURE — 74011000636 HC RX REV CODE- 636: Performed by: SURGERY

## 2022-09-05 PROCEDURE — 74011250637 HC RX REV CODE- 250/637: Performed by: NURSE PRACTITIONER

## 2022-09-05 PROCEDURE — 77030007955 HC PCH ENDOSC SPEC J&J -B: Performed by: SURGERY

## 2022-09-05 PROCEDURE — 77030039895 HC SYST SMK EVAC LAP COVD -B: Performed by: SURGERY

## 2022-09-05 PROCEDURE — 83735 ASSAY OF MAGNESIUM: CPT

## 2022-09-05 PROCEDURE — 94760 N-INVAS EAR/PLS OXIMETRY 1: CPT

## 2022-09-05 PROCEDURE — 74300 X-RAY BILE DUCTS/PANCREAS: CPT

## 2022-09-05 PROCEDURE — 74011000250 HC RX REV CODE- 250: Performed by: NURSE ANESTHETIST, CERTIFIED REGISTERED

## 2022-09-05 PROCEDURE — 65270000029 HC RM PRIVATE

## 2022-09-05 PROCEDURE — 0FT44ZZ RESECTION OF GALLBLADDER, PERCUTANEOUS ENDOSCOPIC APPROACH: ICD-10-PCS | Performed by: SURGERY

## 2022-09-05 PROCEDURE — 77030010513 HC APPL CLP LIG J&J -C: Performed by: SURGERY

## 2022-09-05 PROCEDURE — 77030022474 HC RELD STPLR ENDO GIA COVD -C: Performed by: SURGERY

## 2022-09-05 PROCEDURE — 2709999900 HC NON-CHARGEABLE SUPPLY: Performed by: SURGERY

## 2022-09-05 PROCEDURE — 84484 ASSAY OF TROPONIN QUANT: CPT

## 2022-09-05 PROCEDURE — 77030008603 HC TRCR ENDOSC EPATH J&J -C: Performed by: SURGERY

## 2022-09-05 PROCEDURE — 74011000254 HC RX REV CODE- 254: Performed by: SURGERY

## 2022-09-05 PROCEDURE — 51798 US URINE CAPACITY MEASURE: CPT

## 2022-09-05 PROCEDURE — 47563 LAPARO CHOLECYSTECTOMY/GRAPH: CPT | Performed by: SURGERY

## 2022-09-05 PROCEDURE — 77030002933 HC SUT MCRYL J&J -A: Performed by: SURGERY

## 2022-09-05 PROCEDURE — 88304 TISSUE EXAM BY PATHOLOGIST: CPT

## 2022-09-05 PROCEDURE — 71045 X-RAY EXAM CHEST 1 VIEW: CPT

## 2022-09-05 PROCEDURE — 74011250636 HC RX REV CODE- 250/636: Performed by: INTERNAL MEDICINE

## 2022-09-05 PROCEDURE — 77030038093 HC CATH CHOLGM OP PMI PRGV-C: Performed by: SURGERY

## 2022-09-05 PROCEDURE — 77030002966 HC SUT PDS J&J -A: Performed by: SURGERY

## 2022-09-05 PROCEDURE — 77030003666 HC NDL SPINAL BD -A: Performed by: SURGERY

## 2022-09-05 PROCEDURE — 36415 COLL VENOUS BLD VENIPUNCTURE: CPT

## 2022-09-05 PROCEDURE — 77030040361 HC SLV COMPR DVT MDII -B: Performed by: SURGERY

## 2022-09-05 PROCEDURE — 76060000034 HC ANESTHESIA 1.5 TO 2 HR: Performed by: SURGERY

## 2022-09-05 PROCEDURE — 77030026438 HC STYL ET INTUB CARD -A: Performed by: STUDENT IN AN ORGANIZED HEALTH CARE EDUCATION/TRAINING PROGRAM

## 2022-09-05 PROCEDURE — 77030008756 HC TU IRR SUC STRY -B: Performed by: SURGERY

## 2022-09-05 PROCEDURE — 85730 THROMBOPLASTIN TIME PARTIAL: CPT

## 2022-09-05 RX ORDER — HYDROMORPHONE HYDROCHLORIDE 1 MG/ML
0.2 INJECTION, SOLUTION INTRAMUSCULAR; INTRAVENOUS; SUBCUTANEOUS
Status: DISCONTINUED | OUTPATIENT
Start: 2022-09-05 | End: 2022-09-05 | Stop reason: HOSPADM

## 2022-09-05 RX ORDER — SODIUM CHLORIDE 0.9 % (FLUSH) 0.9 %
5-40 SYRINGE (ML) INJECTION EVERY 8 HOURS
Status: DISCONTINUED | OUTPATIENT
Start: 2022-09-05 | End: 2022-09-05 | Stop reason: HOSPADM

## 2022-09-05 RX ORDER — LIDOCAINE HYDROCHLORIDE 20 MG/ML
INJECTION, SOLUTION EPIDURAL; INFILTRATION; INTRACAUDAL; PERINEURAL AS NEEDED
Status: DISCONTINUED | OUTPATIENT
Start: 2022-09-05 | End: 2022-09-05 | Stop reason: HOSPADM

## 2022-09-05 RX ORDER — MIDAZOLAM HYDROCHLORIDE 1 MG/ML
INJECTION, SOLUTION INTRAMUSCULAR; INTRAVENOUS AS NEEDED
Status: DISCONTINUED | OUTPATIENT
Start: 2022-09-05 | End: 2022-09-05 | Stop reason: HOSPADM

## 2022-09-05 RX ORDER — PROPOFOL 10 MG/ML
INJECTION, EMULSION INTRAVENOUS AS NEEDED
Status: DISCONTINUED | OUTPATIENT
Start: 2022-09-05 | End: 2022-09-05 | Stop reason: HOSPADM

## 2022-09-05 RX ORDER — SODIUM CHLORIDE 0.9 % (FLUSH) 0.9 %
5-40 SYRINGE (ML) INJECTION AS NEEDED
Status: DISCONTINUED | OUTPATIENT
Start: 2022-09-05 | End: 2022-09-05 | Stop reason: HOSPADM

## 2022-09-05 RX ORDER — SODIUM CHLORIDE, SODIUM LACTATE, POTASSIUM CHLORIDE, CALCIUM CHLORIDE 600; 310; 30; 20 MG/100ML; MG/100ML; MG/100ML; MG/100ML
200 INJECTION, SOLUTION INTRAVENOUS CONTINUOUS
Status: DISCONTINUED | OUTPATIENT
Start: 2022-09-05 | End: 2022-09-08 | Stop reason: HOSPADM

## 2022-09-05 RX ORDER — ALPRAZOLAM 0.5 MG/1
0.5 TABLET ORAL ONCE
Status: COMPLETED | OUTPATIENT
Start: 2022-09-05 | End: 2022-09-05

## 2022-09-05 RX ORDER — CEFAZOLIN SODIUM 1 G/3ML
INJECTION, POWDER, FOR SOLUTION INTRAMUSCULAR; INTRAVENOUS AS NEEDED
Status: DISCONTINUED | OUTPATIENT
Start: 2022-09-05 | End: 2022-09-05 | Stop reason: HOSPADM

## 2022-09-05 RX ORDER — DEXAMETHASONE SODIUM PHOSPHATE 4 MG/ML
INJECTION, SOLUTION INTRA-ARTICULAR; INTRALESIONAL; INTRAMUSCULAR; INTRAVENOUS; SOFT TISSUE AS NEEDED
Status: DISCONTINUED | OUTPATIENT
Start: 2022-09-05 | End: 2022-09-05 | Stop reason: HOSPADM

## 2022-09-05 RX ORDER — DEXMEDETOMIDINE HYDROCHLORIDE 100 UG/ML
INJECTION, SOLUTION INTRAVENOUS AS NEEDED
Status: DISCONTINUED | OUTPATIENT
Start: 2022-09-05 | End: 2022-09-05 | Stop reason: HOSPADM

## 2022-09-05 RX ORDER — GLYCOPYRROLATE 0.2 MG/ML
INJECTION INTRAMUSCULAR; INTRAVENOUS AS NEEDED
Status: DISCONTINUED | OUTPATIENT
Start: 2022-09-05 | End: 2022-09-05 | Stop reason: HOSPADM

## 2022-09-05 RX ORDER — HYDROMORPHONE HYDROCHLORIDE 2 MG/ML
INJECTION, SOLUTION INTRAMUSCULAR; INTRAVENOUS; SUBCUTANEOUS AS NEEDED
Status: DISCONTINUED | OUTPATIENT
Start: 2022-09-05 | End: 2022-09-05 | Stop reason: HOSPADM

## 2022-09-05 RX ORDER — ROPIVACAINE HYDROCHLORIDE 5 MG/ML
30 INJECTION, SOLUTION EPIDURAL; INFILTRATION; PERINEURAL AS NEEDED
Status: DISCONTINUED | OUTPATIENT
Start: 2022-09-05 | End: 2022-09-05 | Stop reason: HOSPADM

## 2022-09-05 RX ORDER — FENTANYL CITRATE 50 UG/ML
INJECTION, SOLUTION INTRAMUSCULAR; INTRAVENOUS AS NEEDED
Status: DISCONTINUED | OUTPATIENT
Start: 2022-09-05 | End: 2022-09-05 | Stop reason: HOSPADM

## 2022-09-05 RX ORDER — INDOCYANINE GREEN AND WATER 25 MG
2.5 KIT INJECTION
Status: COMPLETED | OUTPATIENT
Start: 2022-09-05 | End: 2022-09-05

## 2022-09-05 RX ORDER — SODIUM CHLORIDE, SODIUM LACTATE, POTASSIUM CHLORIDE, CALCIUM CHLORIDE 600; 310; 30; 20 MG/100ML; MG/100ML; MG/100ML; MG/100ML
1000 INJECTION, SOLUTION INTRAVENOUS CONTINUOUS
Status: DISCONTINUED | OUTPATIENT
Start: 2022-09-05 | End: 2022-09-05 | Stop reason: HOSPADM

## 2022-09-05 RX ORDER — OXYCODONE HYDROCHLORIDE 5 MG/1
5 TABLET ORAL AS NEEDED
Status: DISCONTINUED | OUTPATIENT
Start: 2022-09-05 | End: 2022-09-05 | Stop reason: HOSPADM

## 2022-09-05 RX ORDER — ONDANSETRON 2 MG/ML
4 INJECTION INTRAMUSCULAR; INTRAVENOUS AS NEEDED
Status: DISCONTINUED | OUTPATIENT
Start: 2022-09-05 | End: 2022-09-05 | Stop reason: HOSPADM

## 2022-09-05 RX ORDER — NEOSTIGMINE METHYLSULFATE 1 MG/ML
INJECTION, SOLUTION INTRAVENOUS AS NEEDED
Status: DISCONTINUED | OUTPATIENT
Start: 2022-09-05 | End: 2022-09-05 | Stop reason: HOSPADM

## 2022-09-05 RX ORDER — ROCURONIUM BROMIDE 10 MG/ML
INJECTION, SOLUTION INTRAVENOUS AS NEEDED
Status: DISCONTINUED | OUTPATIENT
Start: 2022-09-05 | End: 2022-09-05 | Stop reason: HOSPADM

## 2022-09-05 RX ORDER — SODIUM CHLORIDE, SODIUM LACTATE, POTASSIUM CHLORIDE, CALCIUM CHLORIDE 600; 310; 30; 20 MG/100ML; MG/100ML; MG/100ML; MG/100ML
INJECTION, SOLUTION INTRAVENOUS
Status: DISCONTINUED | OUTPATIENT
Start: 2022-09-05 | End: 2022-09-05 | Stop reason: HOSPADM

## 2022-09-05 RX ORDER — FENTANYL CITRATE 50 UG/ML
25 INJECTION, SOLUTION INTRAMUSCULAR; INTRAVENOUS
Status: DISCONTINUED | OUTPATIENT
Start: 2022-09-05 | End: 2022-09-05 | Stop reason: HOSPADM

## 2022-09-05 RX ORDER — PHENYLEPHRINE HCL IN 0.9% NACL 0.4MG/10ML
SYRINGE (ML) INTRAVENOUS AS NEEDED
Status: DISCONTINUED | OUTPATIENT
Start: 2022-09-05 | End: 2022-09-05 | Stop reason: HOSPADM

## 2022-09-05 RX ORDER — LIDOCAINE HYDROCHLORIDE 10 MG/ML
0.1 INJECTION, SOLUTION EPIDURAL; INFILTRATION; INTRACAUDAL; PERINEURAL AS NEEDED
Status: DISCONTINUED | OUTPATIENT
Start: 2022-09-05 | End: 2022-09-05 | Stop reason: HOSPADM

## 2022-09-05 RX ORDER — BUPIVACAINE HYDROCHLORIDE AND EPINEPHRINE 5; 5 MG/ML; UG/ML
INJECTION, SOLUTION EPIDURAL; INTRACAUDAL; PERINEURAL AS NEEDED
Status: DISCONTINUED | OUTPATIENT
Start: 2022-09-05 | End: 2022-09-05 | Stop reason: HOSPADM

## 2022-09-05 RX ORDER — ONDANSETRON 2 MG/ML
INJECTION INTRAMUSCULAR; INTRAVENOUS AS NEEDED
Status: DISCONTINUED | OUTPATIENT
Start: 2022-09-05 | End: 2022-09-05 | Stop reason: HOSPADM

## 2022-09-05 RX ADMIN — DEXAMETHASONE SODIUM PHOSPHATE 4 MG: 4 INJECTION, SOLUTION INTRAMUSCULAR; INTRAVENOUS at 12:37

## 2022-09-05 RX ADMIN — ONDANSETRON HYDROCHLORIDE 4 MG: 2 SOLUTION INTRAMUSCULAR; INTRAVENOUS at 03:31

## 2022-09-05 RX ADMIN — OXYCODONE 10 MG: 5 TABLET ORAL at 22:07

## 2022-09-05 RX ADMIN — ONDANSETRON HYDROCHLORIDE 4 MG: 2 SOLUTION INTRAMUSCULAR; INTRAVENOUS at 17:55

## 2022-09-05 RX ADMIN — CEFAZOLIN 2 G: 330 INJECTION, POWDER, FOR SOLUTION INTRAMUSCULAR; INTRAVENOUS at 12:37

## 2022-09-05 RX ADMIN — INDOCYANINE GREEN AND WATER 2.5 MG: KIT at 11:47

## 2022-09-05 RX ADMIN — LIDOCAINE HYDROCHLORIDE 100 MG: 20 INJECTION, SOLUTION EPIDURAL; INFILTRATION; INTRACAUDAL; PERINEURAL at 12:30

## 2022-09-05 RX ADMIN — Medication 80 MCG: at 13:28

## 2022-09-05 RX ADMIN — DEXMEDETOMIDINE HYDROCHLORIDE 10 MCG: 100 INJECTION, SOLUTION, CONCENTRATE INTRAVENOUS at 13:37

## 2022-09-05 RX ADMIN — MORPHINE SULFATE 1 MG: 2 INJECTION, SOLUTION INTRAMUSCULAR; INTRAVENOUS at 15:35

## 2022-09-05 RX ADMIN — GLYCOPYRROLATE 0.4 MG: 0.2 INJECTION, SOLUTION INTRAMUSCULAR; INTRAVENOUS at 13:52

## 2022-09-05 RX ADMIN — ALPRAZOLAM 0.5 MG: 0.5 TABLET ORAL at 22:07

## 2022-09-05 RX ADMIN — METRONIDAZOLE 500 MG: 500 INJECTION, SOLUTION INTRAVENOUS at 15:35

## 2022-09-05 RX ADMIN — DEXMEDETOMIDINE HYDROCHLORIDE 10 MCG: 100 INJECTION, SOLUTION, CONCENTRATE INTRAVENOUS at 12:58

## 2022-09-05 RX ADMIN — MIDAZOLAM 2 MG: 1 INJECTION INTRAMUSCULAR; INTRAVENOUS at 12:24

## 2022-09-05 RX ADMIN — ROCURONIUM BROMIDE 50 MG: 10 SOLUTION INTRAVENOUS at 12:31

## 2022-09-05 RX ADMIN — PROPOFOL 150 MG: 10 INJECTION, EMULSION INTRAVENOUS at 12:30

## 2022-09-05 RX ADMIN — FENTANYL CITRATE 100 MCG: 50 INJECTION, SOLUTION INTRAMUSCULAR; INTRAVENOUS at 12:30

## 2022-09-05 RX ADMIN — SODIUM CHLORIDE, POTASSIUM CHLORIDE, SODIUM LACTATE AND CALCIUM CHLORIDE: 600; 310; 30; 20 INJECTION, SOLUTION INTRAVENOUS at 12:02

## 2022-09-05 RX ADMIN — Medication 3 MG: at 13:52

## 2022-09-05 RX ADMIN — ONDANSETRON HYDROCHLORIDE 4 MG: 2 SOLUTION INTRAMUSCULAR; INTRAVENOUS at 22:12

## 2022-09-05 RX ADMIN — MORPHINE SULFATE 1 MG: 2 INJECTION, SOLUTION INTRAMUSCULAR; INTRAVENOUS at 19:32

## 2022-09-05 RX ADMIN — CEFAZOLIN 2 G: 1 INJECTION, POWDER, FOR SOLUTION INTRAMUSCULAR; INTRAVENOUS at 07:22

## 2022-09-05 RX ADMIN — ONDANSETRON HYDROCHLORIDE 4 MG: 2 INJECTION, SOLUTION INTRAMUSCULAR; INTRAVENOUS at 13:45

## 2022-09-05 RX ADMIN — ONDANSETRON HYDROCHLORIDE 4 MG: 2 SOLUTION INTRAMUSCULAR; INTRAVENOUS at 07:23

## 2022-09-05 RX ADMIN — HYDROMORPHONE HYDROCHLORIDE 1 MG: 2 INJECTION, SOLUTION INTRAMUSCULAR; INTRAVENOUS; SUBCUTANEOUS at 12:40

## 2022-09-05 RX ADMIN — METRONIDAZOLE 500 MG: 500 INJECTION, SOLUTION INTRAVENOUS at 03:31

## 2022-09-05 RX ADMIN — CEFAZOLIN 2 G: 1 INJECTION, POWDER, FOR SOLUTION INTRAMUSCULAR; INTRAVENOUS at 20:43

## 2022-09-05 NOTE — PROGRESS NOTES
TRANSFER - IN REPORT:    Verbal report received from Corinne(name) on Vladislav Mathis  being received from PACU(unit) for routine post - op      Report consisted of patients Situation, Background, Assessment and   Recommendations(SBAR). Information from the following report(s) SBAR and MAR was reviewed with the receiving nurse. Opportunity for questions and clarification was provided. Assessment completed upon patients arrival to unit and care assumed.

## 2022-09-05 NOTE — PROGRESS NOTES
[address]  [address]  Lizbeth Grigsby  [phone]               GASTROENTEROLOGY  PROGRESS NOTE        NAME: Angel Hewitt   :  1995   MRN:  [id number]     Date of Admission: 9/3/2022  Consult Date: 2022     Assessment:   Abnormal intra-op cholangiogram: . Plan for ERCP in am  Elevated liver enzymes: Negative work up so far. Although imaging did not show biliary obstruction, IOC was abnormal and likely cause. Cholecystitis: NO clear findings on imaging, however presentation highly suggestive along with elevated liver enzymes. s/p cholecystectomy   RUQ abdominal pain, nausea: Likely related to obstruction as well as post op pain         Recommendations:   Diet per surgery today. NPO after midnight for ERCP in am (discussed with Dr. Kandi Soria)  Hold all anticoagulation       Thank you for allowing us to participate in the care of this patient. Please do not hesitate to contact us with any further questions/concerns. Maribeth Brown MD  [facility]    Subjective: Went for cholecystectomy today. Reports ongoing pain      Objective:   /78   Pulse 80   Temp 97.8 °F (36.6 °C)   Resp 16   SpO2 100%     Physical Exam    General: Alert, appears somewhat uncomfortable related to surgery  HEENT: icteric sclerae. Abdomen: Soft, Non distended, tender over incisions. Extremities: No peripheral edema  Neurologic:  CN 2-12 gi, Alert and oriented X 3. No acute neurological distress   Psych:   Good insight. Not anxious nor agitated. Labs: Reviewed     9/3/22 12:50 22 15:48 22 03:45   Bilirubin, total 4.6 (H) 5.0 (H) 3.5 (H)   Bilirubin, direct 3.3 (H) 3.8 (H) 2.3 (H)   Protein, total 7.6 8.1 6.9   Albumin 3.9 4.0 3.2 (L)   Globulin 3.7 4.1 (H) 3.7   A-G Ratio 1.1 1.0 (L) 0.9 (L)    (H) 633 (H) 476 (H)    (H) 197 (H) 140 (H)   Alk.  phosphatase 120 (H) 134 (H) 113      22 03:45   WBC 3.1 (L)   HGB 13.2   HCT 38.6   MCV 83.5   PLATELET 307     Interval Imaging: Reviewed

## 2022-09-05 NOTE — PERIOP NOTES
TRANSFER - OUT REPORT:    Verbal report given to Tacos Smith Jeff Cooper  being transferred to [place] [address] for routine post - op       Report consisted of patients Situation, Background, Assessment and   Recommendations(SBAR). Time Pre op antibiotic given:0722  Anesthesia Stop time: 1406    Information from the following report(s) SBAR, Procedure Summary, Intake/Output, and MAR was reviewed with the receiving nurse. Opportunity for questions and clarification was provided. Is the patient on 02? NO          Is the patient on a monitor? NO    Is the nurse transporting with the patient? NO    Surgical Waiting Area notified of patient's transfer from PACU? YES      The following personal items collected during your admission accompanied patient upon transfer:   Dental Appliance: Dental Appliances: None (pt from room belongings upstairs in room)  Vision:    Hearing Aid:    Jewelry: Jewelry: None  Clothing: Clothing: Pants, Shirt, Footwear, Undergarments  Other Valuables:  Other Valuables: None  Valuables sent to safe: Personal Items Sent to Safe: None

## 2022-09-05 NOTE — OP NOTES
OPERATIVE NOTE    Date of Procedure: 9/5/2022     Preoperative Diagnosis: CHOLECYSTITIS  Postoperative Diagnosis: CHOLECYSTITIS      Procedure: Procedure(s):  CHOLECYSTECTOMY LAPAROSCOPIC WITH GRAMS    Surgeon: Va Keenan MD    Surgical Staff: Circ-1: Nandini Bryant RN; Glen Palmer RN  Scrub Tech-1: Radha Gallardo; Evelia Lopez  Surg Asst-1: [facility]    Anesthesia: General   Indications: 23 y/o F with concern for choledocholithiasis  Findings: Dilated CBD with distal small filling defects WITHOUT emptying into duodenum    Description of Operation: Darryl Santana was identified in the pre-operative holding area. Informed consent was obtained after a complete discussion of risks, benefits and alternatives to surgery were had with the patient. The patient was brought back to the operating room and placed under general endotracheal anesthesia in the supine position on the operating room table with a foot board. The patient was then prepped and draped in the usual sterile fashion. A timeout was performed. We then injected local anesthetic cephalad to the umbilicus. We placed a 5 mm optiview trocar and safely entered the abdomen. The patient was placed in steep reverse Trendelenburg with the table tilted to the left. Two additional trocars were placed under the right costal margin. The umbilical trocar was up sized to a 12 mm trocar. A final 5 mm trocar was placed in the right subcostal space along the midclavicular line. The gallbladder was distended but no inflamed. We aspirated about 90cc bile off the gallbladder with a laparoscopic needle. The dome of the gallbladder was grasped and retracted anteriorly and laterally over the liver edge. The infundibulum was grasped with a bettye and retracted laterally. Using the hook cautery, the peritoneum of the gallbladder was incised around the infundibulum and lateral boarders.  We identified the cystic duct and cystic artery and after further dissection obtained the critical view of safety. We then placed two 5mm clips proximally and one distally on the cystic artery and transected it. I then elected to perform a dome down dissection given the size of the cystic duct. This was done without complication until I reach the infundibulum. I elected to use a Trotter clamp and catheter to control the infundibulum and perform and IOC. The catheter was placed and saline flushed well. Next, water soluble contrast solution was injected into the cystic duct. The findings demonstrated: tortuous cystic duct. Good filling of CHD and hepatic ducts, CBD dilated with small filling defect distally, likely sludge, WITHOUT emptying into duodenum. After completion of the IOC, the clamp and catheter were removed. The duct was large and I elected to staple the cyst duct. I up sized the epigastric trocar to a 12 mm trocar and fired a tan 45 mm Signia staple load. The gallbladder was then liberated from the gallbladder fossa and placed in an Endo-catch bag. Hemostasis was confirmed and we ensured no spilled stones. Next, we closed the 12 mm periumbilical and epigastric trocar sites with 0 PDS on a trans-fascial suture passer. We removed the two 5 mm trocars under direct visualization to ensure no hemorrhage. We then released the pneumoperitoneum and removed the 12 mm trocars. The specimen was removed via the umbilical trocar site. The PDS sutures were tied down. The dermis was approximated with 4-0 Monocryl suture followed by Dermabond for the skin. At the end of the procedure all instrument, needle, and sponge counts were correct. I was present and scrubbed throughout the entirety of the case. The patient awoke from anesthesia and was extubated without complication and sent to PACU in stable condition.       Estimated Blood Loss: 5 mL    Specimens:   ID Type Source Tests Collected by Time Destination   1 : GALLBLADDER Fresh Gallbladder  Paty Miller MD 9/5/2022 1342 Pathology        Complications: None    Implants: * No implants in log *      Gordy Andrade MD  Bariatric and General Surgeon  [facility]  9/5/2022

## 2022-09-05 NOTE — ANESTHESIA POSTPROCEDURE EVALUATION
Procedure(s):  CHOLECYSTECTOMY LAPAROSCOPIC WITH GRAMS. general    Anesthesia Post Evaluation      Multimodal analgesia: multimodal analgesia used between 6 hours prior to anesthesia start to PACU discharge  Patient location during evaluation: PACU  Level of consciousness: awake  Pain management: satisfactory to patient  Airway patency: patent  Anesthetic complications: no  Cardiovascular status: acceptable and hemodynamically stable  Respiratory status: acceptable  Hydration status: acceptable  Post anesthesia nausea and vomiting:  none  Final Post Anesthesia Temperature Assessment:  Normothermia (36.0-37.5 degrees C)      INITIAL Post-op Vital signs:   Vitals Value Taken Time   /73 09/05/22 1430   Temp 36.7 °C (98 °F) 09/05/22 1425   Pulse 80 09/05/22 1439   Resp 15 09/05/22 1439   SpO2 96 % 09/05/22 1439   Vitals shown include unvalidated device data.

## 2022-09-05 NOTE — PROGRESS NOTES
[address] [facility] Adult  Hospitalist Group                                                                                          Hospitalist Progress Note  Venancio Massey MD  Answering service: [phone] OR [phone] from in house phone        Date of Service:  2022  NAME:  Josey Allen  :  1995  MRN:  [id number]      Admission Summary:   Josey Allen is a 24 y.o. female with past medical history of hypertension, congenital solitary kidney, hidradenitis suppurativa presented as a direct admission/ transfer [facility] to [facility]) with chief complaints of back pain, abdominal pain, nausea and dark urine. Symptom worsened yesterday but has been ongoing \"on and off\" for several months with RUQ pain radiating to \"middle of back\", constant, severe, dull, aching, aggravated ~ 1 hour after eating certain foods such as diary, salads, and bread, increased with deep breathing, associated with nausea and dark urine. On arrival at [facility], workup included labs showing , , Alkaline phosphatase 120, total bilirubin 4.6. CT abdomen pelvis with IV no acute abdominal or pelvic pathology with cholelithiasis. Patient is now seen on transfer to [facility] for admission to the hospitalist service. Patient was given Dilaudid IV earlier tonight but she now reports itching. Interval history / Subjective: Follow up abdominal pain     Assessment & Plan:     Cholelithiasis  Hyperbilirubinemia/transaminitis  Abdominal pain  -symptomatic cholelithiasis  -MRI abd cholelithiasis no evidence of acute cholecystitis.  No intrahepatic ductal dilatation  -Appreciate surgery, Lap melissa today, likely discharge tomorrow    Hypertension: home meds  Obesity:BMI 34.46 kg/m2: counseling once more stable     NPO     Code status: FULL CODE  Prophylaxis: heparin    Plan: Lap melissa today  Care Plan discussed with: Patient  Anticipated Disposition: ?tomorrow     Hospital Problems  Date Reviewed: 9/4/2022            Codes Class Noted POA    Choledocholithiasis ICD-10-CM: K80.50  ICD-9-CM: 574.50  9/3/2022 Unknown           Review of Systems:   A comprehensive review of systems was negative except for that written in the HPI. Vital Signs:    Last 24hrs VS reviewed since prior progress note. Most recent are:  Visit Vitals  /63   Pulse 80   Temp 98.2 °F (36.8 °C)   Resp 16   SpO2 100%       No intake or output data in the 24 hours ending 09/05/22 1055       Physical Examination:     I had a face to face encounter with this patient and independently examined them on 9/5/2022 as outlined below:          Constitutional:  No acute distress   ENT:  Oral mucosa moist, oropharynx benign. Resp:  CTA bilaterally. No wheezing/rhonchi/rales. No accessory muscle use. CV:  Regular rhythm, normal rate, no murmurs, gallops, rubs    GI:  Soft, non distended, non tender. normoactive bowel sounds, no hepatosplenomegaly     Musculoskeletal:  No edema, warm, 2+ pulses throughout    Neurologic:  Moves all extremities. AAOx3, CN II-XII reviewed            Data Review:    Review and/or order of clinical lab test      Labs:     Recent Labs     09/05/22  0345 09/03/22  1250   WBC 3.1* 3.9   HGB 13.2 13.2   HCT 38.6 38.8    206       Recent Labs     09/05/22  0345 09/03/22  1250   * 140   K 3.6 3.7    103   CO2 24 27   BUN 5* 5*   CREA 0.80 0.73   GLU 88 98   CA 8.7 8.3*   MG 2.1  --        Recent Labs     09/05/22  0345 09/04/22  1548 09/03/22  1250   * 633* 809*    134* 120*   TBILI 3.5* 5.0* 4.6*   TP 6.9 8.1 7.6   ALB 3.2* 4.0 3.9   GLOB 3.7 4.1* 3.7   LPSE  --   --  129       Recent Labs     09/05/22  0345   INR 1.0   PTP 10.7   APTT 28.3        No results for input(s): FE, TIBC, PSAT, FERR in the last 72 hours. No results found for: FOL, RBCF   No results for input(s): PH, PCO2, PO2 in the last 72 hours.   No results for input(s): CPK, CKNDX, TROIQ in the last 72 hours.    No lab exists for component: CPKMB  Lab Results   Component Value Date/Time    Cholesterol, total 248 (H) 08/30/2017 01:39 PM    HDL Cholesterol 48 08/30/2017 01:39 PM    LDL, calculated 168 (H) 08/30/2017 01:39 PM    Triglyceride 161 (H) 08/30/2017 01:39 PM     No results found for: GLUCPOC  Lab Results   Component Value Date/Time    Color DARK YELLOW 09/03/2022 02:06 PM    Appearance HAZY (A) 09/03/2022 02:06 PM    Specific gravity 1.020 09/03/2022 02:06 PM    Specific gravity 1.024 02/07/2022 01:23 PM    pH (UA) 6.0 09/03/2022 02:06 PM    Protein Negative 09/03/2022 02:06 PM    Glucose Negative 09/03/2022 02:06 PM    Ketone Negative 09/03/2022 02:06 PM    Bilirubin Negative 02/07/2022 01:23 PM    Urobilinogen 1.0 09/03/2022 02:06 PM    Nitrites Negative 09/03/2022 02:06 PM    Leukocyte Esterase SMALL (A) 09/03/2022 02:06 PM    Epithelial cells MANY (A) 09/03/2022 02:06 PM    Bacteria Negative 09/03/2022 02:06 PM    WBC 5-10 09/03/2022 02:06 PM    RBC 0-5 09/03/2022 02:06 PM         Medications Reviewed:     Current Facility-Administered Medications   Medication Dose Route Frequency    indocyanine green (IC GREEN) solr 2.5 mg  2.5 mg IntraVENous ON CALL TO OR    lactated Ringers infusion 1,000 mL  1,000 mL IntraVENous CONTINUOUS    sodium chloride (NS) flush 5-40 mL  5-40 mL IntraVENous Q8H    sodium chloride (NS) flush 5-40 mL  5-40 mL IntraVENous PRN    lidocaine (PF) (XYLOCAINE) 10 mg/mL (1 %) injection 0.1 mL  0.1 mL SubCUTAneous PRN    ropivacaine (PF) (NAROPIN) 5 mg/mL (0.5 %) injection 30 mL  30 mL Intra artICUlar PRN    sodium chloride (NS) flush 5-40 mL  5-40 mL IntraVENous Q8H    sodium chloride (NS) flush 5-40 mL  5-40 mL IntraVENous PRN    polyethylene glycol (MIRALAX) packet 17 g  17 g Oral DAILY PRN    morphine injection 1 mg  1 mg IntraVENous Q4H PRN    heparin (porcine) injection 5,000 Units  5,000 Units SubCUTAneous Q12H    0.45% sodium chloride infusion  75 mL/hr IntraVENous CONTINUOUS acetaminophen (TYLENOL) tablet 1,000 mg  1,000 mg Oral Q6H PRN    oxyCODONE IR (ROXICODONE) tablet 5 mg  5 mg Oral Q4H PRN    oxyCODONE IR (ROXICODONE) tablet 10 mg  10 mg Oral Q4H PRN    ondansetron (ZOFRAN ODT) tablet 4 mg  4 mg Oral Q6H PRN    ceFAZolin (ANCEF) 2 g in sterile water (preservative free) 20 mL IV syringe  2 g IntraVENous Q8H    metroNIDAZOLE (FLAGYL) IVPB premix 500 mg  500 mg IntraVENous Q12H    ondansetron (ZOFRAN) injection 4 mg  4 mg IntraVENous Q4H PRN    ondansetron (ZOFRAN) injection 4 mg  4 mg IntraVENous Q8H     ______________________________________________________________________  EXPECTED LENGTH OF STAY: - - -  ACTUAL LENGTH OF STAY:          2                 Franklin Barbour MD

## 2022-09-05 NOTE — PROGRESS NOTES
Problem: Pain  Goal: *Control of Pain  Outcome: Progressing Towards Goal     Problem: Patient Education: Go to Patient Education Activity  Goal: Patient/Family Education  Outcome: Progressing Towards Goal     Problem: Nausea/Vomiting (Adult)  Goal: *Absence of nausea/vomiting  Outcome: Progressing Towards Goal

## 2022-09-05 NOTE — ROUTINE PROCESS
Patient: Sen Garcia MRN: [id number]  SSN: [id number]   YOB: 1995  Age: 24 y.o. Sex: female     Patient is status post Procedure(s):  CHOLECYSTECTOMY LAPAROSCOPIC WITH GRAMS. Surgeon(s) and Role:     * Migue Sandhu MD - Primary    Local/Dose/Irrigation:  See [facility]                  Peripheral IV 09/03/22 Right Antecubital (Active)   Site Assessment Clean, dry, & intact 09/05/22 1132   Phlebitis Assessment 0 09/05/22 1132   Infiltration Assessment 0 09/05/22 1132   Dressing Status Clean, dry, & intact 09/05/22 1132   Dressing Type Transparent 09/05/22 1132   Hub Color/Line Status Pink; Infusing 09/05/22 1132   Action Taken Open ports on tubing capped 09/05/22 1132   Alcohol Cap Used Yes 09/05/22 1132            Airway - Endotracheal Tube 09/05/22 Oral (Active)                   Dressing/Packing:  Incision 09/05/22 Abdomen-Dressing/Treatment: Surgical glue (09/05/22 1300)    Splint/Cast:  ]

## 2022-09-05 NOTE — PROGRESS NOTES
Surgery Progress Note    9/5/2022    Admit Date: 9/3/2022    CC: Nausea      Subjective:     MRI without obvious stones in CBD. Nauseous this morning. Mild abdominal pain. Constitutional: No fever or chills  Neurologic: No headache  Eyes: No scleral icterus or irritated eyes  Nose: No nasal pain or drainage  Mouth: No oral lesions or sore throat  Cardiac: No palpations or chest pain  Pulmonary: No cough or shortness of breath  Gastrointestinal: Nausea, abdominal pain, no emesis, diarrhea, or constipation  Genitourinary: No dysuria  Musculoskeletal: No muscle or joint tenderness  Skin: No rashes or lesions  Psychiatric: No anxiety or depressed mood    Objective:   Visit Vitals  /63   Pulse 80   Temp 98.2 °F (36.8 °C)   Resp 16   SpO2 100%       General: No acute distress, conversant  Eyes: PERRLA, no scleral icterus  HENT: Normocephalic without oral lesions  Neck: Trachea midline without LAD  Cardiac: Normal pulse rate and rhythm  Pulmonary: Symmetric chest rise with normal effort  GI: Soft, mild tenderness right upper quadrant, ND, no hernia, no splenomegaly  Skin: Warm without rash  Extremities: No edema or joint stiffness  Psych: Appropriate mood and affect    Labs, vital signs, and I/O reviewed. Assessment:     22-year-old female with concern for choledocholithiasis    Plan:     As needed pain control, IV fluids, n.p.o., Ancef for OR. High suspicion for obstructive CBD stone. Plan for lap melissa today with IOC. Discussed the risk and benefits of surgery including bleeding, infection, biliary tree injury, cystic duct leak, CBD stone, and the need for possible ERCP. Also discussed the risk of anesthesia. Plan for OR today.     Hilary Vizcarra MD  Bariatric and General Surgeon  [facility] [[email or web address]] [facility]

## 2022-09-06 ENCOUNTER — ANESTHESIA (OUTPATIENT)
Dept: ENDOSCOPY | Age: 27
DRG: 418 | End: 2022-09-06
Payer: COMMERCIAL

## 2022-09-06 ENCOUNTER — ANESTHESIA EVENT (OUTPATIENT)
Dept: ENDOSCOPY | Age: 27
DRG: 418 | End: 2022-09-06
Payer: COMMERCIAL

## 2022-09-06 ENCOUNTER — APPOINTMENT (OUTPATIENT)
Dept: GENERAL RADIOLOGY | Age: 27
DRG: 418 | End: 2022-09-06
Attending: INTERNAL MEDICINE
Payer: COMMERCIAL

## 2022-09-06 LAB
ALBUMIN SERPL-MCNC: 4 G/DL (ref 3.5–5)
ALBUMIN/GLOB SERPL: 1 {RATIO} (ref 1.1–2.2)
ALP SERPL-CCNC: 148 U/L (ref 45–117)
ALT SERPL-CCNC: 501 U/L (ref 12–78)
ANION GAP SERPL CALC-SCNC: 12 MMOL/L (ref 5–15)
AST SERPL-CCNC: 182 U/L (ref 15–37)
ATRIAL RATE: 74 BPM
BASOPHILS # BLD: 0 K/UL (ref 0–0.1)
BASOPHILS NFR BLD: 0 % (ref 0–1)
BILIRUB SERPL-MCNC: 4.9 MG/DL (ref 0.2–1)
BUN SERPL-MCNC: 6 MG/DL (ref 6–20)
BUN/CREAT SERPL: 6 (ref 12–20)
CALCIUM SERPL-MCNC: 9.6 MG/DL (ref 8.5–10.1)
CALCULATED P AXIS, ECG09: 25 DEGREES
CALCULATED R AXIS, ECG10: 30 DEGREES
CALCULATED T AXIS, ECG11: 5 DEGREES
CHLORIDE SERPL-SCNC: 102 MMOL/L (ref 97–108)
CO2 SERPL-SCNC: 21 MMOL/L (ref 21–32)
CREAT SERPL-MCNC: 1.07 MG/DL (ref 0.55–1.02)
DIAGNOSIS, 93000: NORMAL
DIFFERENTIAL METHOD BLD: ABNORMAL
EOSINOPHIL # BLD: 0 K/UL (ref 0–0.4)
EOSINOPHIL NFR BLD: 0 % (ref 0–7)
ERYTHROCYTE [DISTWIDTH] IN BLOOD BY AUTOMATED COUNT: 12.2 % (ref 11.5–14.5)
GLOBULIN SER CALC-MCNC: 4.1 G/DL (ref 2–4)
GLUCOSE SERPL-MCNC: 105 MG/DL (ref 65–100)
HCT VFR BLD AUTO: 43.5 % (ref 35–47)
HGB BLD-MCNC: 15 G/DL (ref 11.5–16)
IMM GRANULOCYTES # BLD AUTO: 0 K/UL (ref 0–0.04)
IMM GRANULOCYTES NFR BLD AUTO: 0 % (ref 0–0.5)
LYMPHOCYTES # BLD: 1.1 K/UL (ref 0.8–3.5)
LYMPHOCYTES NFR BLD: 16 % (ref 12–49)
MCH RBC QN AUTO: 28.6 PG (ref 26–34)
MCHC RBC AUTO-ENTMCNC: 34.5 G/DL (ref 30–36.5)
MCV RBC AUTO: 82.9 FL (ref 80–99)
MONOCYTES # BLD: 0.5 K/UL (ref 0–1)
MONOCYTES NFR BLD: 7 % (ref 5–13)
NEUTS SEG # BLD: 5.1 K/UL (ref 1.8–8)
NEUTS SEG NFR BLD: 77 % (ref 32–75)
NRBC # BLD: 0 K/UL (ref 0–0.01)
NRBC BLD-RTO: 0 PER 100 WBC
P-R INTERVAL, ECG05: 136 MS
PLATELET # BLD AUTO: 259 K/UL (ref 150–400)
PMV BLD AUTO: 11.3 FL (ref 8.9–12.9)
POTASSIUM SERPL-SCNC: 4 MMOL/L (ref 3.5–5.1)
PROT SERPL-MCNC: 8.1 G/DL (ref 6.4–8.2)
Q-T INTERVAL, ECG07: 418 MS
QRS DURATION, ECG06: 88 MS
QTC CALCULATION (BEZET), ECG08: 463 MS
RBC # BLD AUTO: 5.25 M/UL (ref 3.8–5.2)
SODIUM SERPL-SCNC: 135 MMOL/L (ref 136–145)
VENTRICULAR RATE, ECG03: 74 BPM
WBC # BLD AUTO: 6.7 K/UL (ref 3.6–11)

## 2022-09-06 PROCEDURE — 65270000029 HC RM PRIVATE

## 2022-09-06 PROCEDURE — 77030041276 HC SPHNTOM BILI BSC -F: Performed by: INTERNAL MEDICINE

## 2022-09-06 PROCEDURE — 74011250636 HC RX REV CODE- 250/636: Performed by: SURGERY

## 2022-09-06 PROCEDURE — 74011000250 HC RX REV CODE- 250: Performed by: NURSE ANESTHETIST, CERTIFIED REGISTERED

## 2022-09-06 PROCEDURE — 74011250637 HC RX REV CODE- 250/637: Performed by: SURGERY

## 2022-09-06 PROCEDURE — 74011250637 HC RX REV CODE- 250/637: Performed by: HOSPITALIST

## 2022-09-06 PROCEDURE — 77030040361 HC SLV COMPR DVT MDII -B: Performed by: INTERNAL MEDICINE

## 2022-09-06 PROCEDURE — 74011250637 HC RX REV CODE- 250/637: Performed by: INTERNAL MEDICINE

## 2022-09-06 PROCEDURE — 76060000033 HC ANESTHESIA 1 TO 1.5 HR: Performed by: INTERNAL MEDICINE

## 2022-09-06 PROCEDURE — 76040000008: Performed by: INTERNAL MEDICINE

## 2022-09-06 PROCEDURE — 77030026438 HC STYL ET INTUB CARD -A: Performed by: NURSE ANESTHETIST, CERTIFIED REGISTERED

## 2022-09-06 PROCEDURE — 94760 N-INVAS EAR/PLS OXIMETRY 1: CPT

## 2022-09-06 PROCEDURE — 99024 POSTOP FOLLOW-UP VISIT: CPT | Performed by: SURGERY

## 2022-09-06 PROCEDURE — 74011250636 HC RX REV CODE- 250/636: Performed by: INTERNAL MEDICINE

## 2022-09-06 PROCEDURE — 2709999900 HC NON-CHARGEABLE SUPPLY: Performed by: INTERNAL MEDICINE

## 2022-09-06 PROCEDURE — 74011000250 HC RX REV CODE- 250: Performed by: SURGERY

## 2022-09-06 PROCEDURE — 77030009038 HC CATH BILI STN RTVR BSC -C: Performed by: INTERNAL MEDICINE

## 2022-09-06 PROCEDURE — 77030008684 HC TU ET CUF COVD -B: Performed by: NURSE ANESTHETIST, CERTIFIED REGISTERED

## 2022-09-06 PROCEDURE — 74011250636 HC RX REV CODE- 250/636: Performed by: NURSE ANESTHETIST, CERTIFIED REGISTERED

## 2022-09-06 PROCEDURE — 77030007288 HC DEV LOK BILI BSC -A: Performed by: INTERNAL MEDICINE

## 2022-09-06 RX ORDER — SUCCINYLCHOLINE CHLORIDE 20 MG/ML
INJECTION INTRAMUSCULAR; INTRAVENOUS AS NEEDED
Status: DISCONTINUED | OUTPATIENT
Start: 2022-09-06 | End: 2022-09-06 | Stop reason: HOSPADM

## 2022-09-06 RX ORDER — DEXTROMETHORPHAN/PSEUDOEPHED 2.5-7.5/.8
1.2 DROPS ORAL
Status: DISCONTINUED | OUTPATIENT
Start: 2022-09-06 | End: 2022-09-06 | Stop reason: HOSPADM

## 2022-09-06 RX ORDER — NALOXONE HYDROCHLORIDE 0.4 MG/ML
0.4 INJECTION, SOLUTION INTRAMUSCULAR; INTRAVENOUS; SUBCUTANEOUS
Status: DISCONTINUED | OUTPATIENT
Start: 2022-09-06 | End: 2022-09-06 | Stop reason: HOSPADM

## 2022-09-06 RX ORDER — LISINOPRIL 10 MG/1
10 TABLET ORAL DAILY
Status: DISCONTINUED | OUTPATIENT
Start: 2022-09-07 | End: 2022-09-08 | Stop reason: HOSPADM

## 2022-09-06 RX ORDER — ALPRAZOLAM 0.5 MG/1
0.5 TABLET ORAL
Status: DISCONTINUED | OUTPATIENT
Start: 2022-09-06 | End: 2022-09-06

## 2022-09-06 RX ORDER — HYDROCHLOROTHIAZIDE 25 MG/1
12.5 TABLET ORAL DAILY
Status: DISCONTINUED | OUTPATIENT
Start: 2022-09-07 | End: 2022-09-08 | Stop reason: HOSPADM

## 2022-09-06 RX ORDER — EPINEPHRINE 0.1 MG/ML
1 INJECTION INTRACARDIAC; INTRAVENOUS ONCE
Status: DISCONTINUED | OUTPATIENT
Start: 2022-09-06 | End: 2022-09-06 | Stop reason: HOSPADM

## 2022-09-06 RX ORDER — PROPOFOL 10 MG/ML
INJECTION, EMULSION INTRAVENOUS AS NEEDED
Status: DISCONTINUED | OUTPATIENT
Start: 2022-09-06 | End: 2022-09-06 | Stop reason: HOSPADM

## 2022-09-06 RX ORDER — ATROPINE SULFATE 0.1 MG/ML
0.4 INJECTION INTRAVENOUS ONCE
Status: DISCONTINUED | OUTPATIENT
Start: 2022-09-06 | End: 2022-09-06 | Stop reason: HOSPADM

## 2022-09-06 RX ORDER — ALPRAZOLAM 0.5 MG/1
0.5 TABLET ORAL
Status: DISCONTINUED | OUTPATIENT
Start: 2022-09-06 | End: 2022-09-08 | Stop reason: HOSPADM

## 2022-09-06 RX ORDER — FENTANYL CITRATE 50 UG/ML
INJECTION, SOLUTION INTRAMUSCULAR; INTRAVENOUS AS NEEDED
Status: DISCONTINUED | OUTPATIENT
Start: 2022-09-06 | End: 2022-09-06 | Stop reason: HOSPADM

## 2022-09-06 RX ORDER — MIDAZOLAM HYDROCHLORIDE 1 MG/ML
.25-5 INJECTION, SOLUTION INTRAMUSCULAR; INTRAVENOUS
Status: DISCONTINUED | OUTPATIENT
Start: 2022-09-06 | End: 2022-09-06 | Stop reason: HOSPADM

## 2022-09-06 RX ORDER — FENTANYL CITRATE 50 UG/ML
25-200 INJECTION, SOLUTION INTRAMUSCULAR; INTRAVENOUS
Status: DISCONTINUED | OUTPATIENT
Start: 2022-09-06 | End: 2022-09-06 | Stop reason: HOSPADM

## 2022-09-06 RX ORDER — SODIUM CHLORIDE 0.9 % (FLUSH) 0.9 %
5-40 SYRINGE (ML) INJECTION AS NEEDED
Status: DISCONTINUED | OUTPATIENT
Start: 2022-09-06 | End: 2022-09-08 | Stop reason: HOSPADM

## 2022-09-06 RX ORDER — FLUMAZENIL 0.1 MG/ML
0.2 INJECTION INTRAVENOUS
Status: DISCONTINUED | OUTPATIENT
Start: 2022-09-06 | End: 2022-09-06 | Stop reason: HOSPADM

## 2022-09-06 RX ORDER — DEXAMETHASONE SODIUM PHOSPHATE 4 MG/ML
INJECTION, SOLUTION INTRA-ARTICULAR; INTRALESIONAL; INTRAMUSCULAR; INTRAVENOUS; SOFT TISSUE AS NEEDED
Status: DISCONTINUED | OUTPATIENT
Start: 2022-09-06 | End: 2022-09-06 | Stop reason: HOSPADM

## 2022-09-06 RX ORDER — ATROPINE SULFATE 0.1 MG/ML
0.5 INJECTION INTRAVENOUS
Status: DISCONTINUED | OUTPATIENT
Start: 2022-09-06 | End: 2022-09-06 | Stop reason: HOSPADM

## 2022-09-06 RX ORDER — SODIUM CHLORIDE, SODIUM LACTATE, POTASSIUM CHLORIDE, CALCIUM CHLORIDE 600; 310; 30; 20 MG/100ML; MG/100ML; MG/100ML; MG/100ML
INJECTION, SOLUTION INTRAVENOUS
Status: DISCONTINUED | OUTPATIENT
Start: 2022-09-06 | End: 2022-09-06 | Stop reason: HOSPADM

## 2022-09-06 RX ORDER — ROCURONIUM BROMIDE 10 MG/ML
INJECTION, SOLUTION INTRAVENOUS AS NEEDED
Status: DISCONTINUED | OUTPATIENT
Start: 2022-09-06 | End: 2022-09-06 | Stop reason: HOSPADM

## 2022-09-06 RX ORDER — ONDANSETRON 2 MG/ML
INJECTION INTRAMUSCULAR; INTRAVENOUS AS NEEDED
Status: DISCONTINUED | OUTPATIENT
Start: 2022-09-06 | End: 2022-09-06 | Stop reason: HOSPADM

## 2022-09-06 RX ORDER — LIDOCAINE HYDROCHLORIDE 20 MG/ML
INJECTION, SOLUTION EPIDURAL; INFILTRATION; INTRACAUDAL; PERINEURAL AS NEEDED
Status: DISCONTINUED | OUTPATIENT
Start: 2022-09-06 | End: 2022-09-06 | Stop reason: HOSPADM

## 2022-09-06 RX ORDER — EPINEPHRINE 0.1 MG/ML
1 INJECTION INTRACARDIAC; INTRAVENOUS
Status: DISCONTINUED | OUTPATIENT
Start: 2022-09-06 | End: 2022-09-06 | Stop reason: HOSPADM

## 2022-09-06 RX ORDER — SODIUM CHLORIDE 0.9 % (FLUSH) 0.9 %
5-40 SYRINGE (ML) INJECTION EVERY 8 HOURS
Status: DISCONTINUED | OUTPATIENT
Start: 2022-09-06 | End: 2022-09-08 | Stop reason: HOSPADM

## 2022-09-06 RX ADMIN — LIDOCAINE HYDROCHLORIDE 100 MG: 20 INJECTION, SOLUTION EPIDURAL; INFILTRATION; INTRACAUDAL; PERINEURAL at 12:39

## 2022-09-06 RX ADMIN — MORPHINE SULFATE 1 MG: 2 INJECTION, SOLUTION INTRAMUSCULAR; INTRAVENOUS at 09:06

## 2022-09-06 RX ADMIN — PROPOFOL 200 MG: 10 INJECTION, EMULSION INTRAVENOUS at 12:39

## 2022-09-06 RX ADMIN — CEFAZOLIN 2 G: 1 INJECTION, POWDER, FOR SOLUTION INTRAMUSCULAR; INTRAVENOUS at 21:51

## 2022-09-06 RX ADMIN — CEFAZOLIN 2 G: 1 INJECTION, POWDER, FOR SOLUTION INTRAMUSCULAR; INTRAVENOUS at 14:46

## 2022-09-06 RX ADMIN — SUCCINYLCHOLINE CHLORIDE 160 MG: 20 INJECTION, SOLUTION INTRAMUSCULAR; INTRAVENOUS at 12:39

## 2022-09-06 RX ADMIN — FENTANYL CITRATE 50 MCG: 50 INJECTION, SOLUTION INTRAMUSCULAR; INTRAVENOUS at 12:39

## 2022-09-06 RX ADMIN — MORPHINE SULFATE 1 MG: 2 INJECTION, SOLUTION INTRAMUSCULAR; INTRAVENOUS at 21:58

## 2022-09-06 RX ADMIN — METRONIDAZOLE 500 MG: 500 INJECTION, SOLUTION INTRAVENOUS at 03:49

## 2022-09-06 RX ADMIN — ACETAMINOPHEN 1000 MG: 500 TABLET, FILM COATED ORAL at 21:58

## 2022-09-06 RX ADMIN — ONDANSETRON HYDROCHLORIDE 4 MG: 2 SOLUTION INTRAMUSCULAR; INTRAVENOUS at 03:49

## 2022-09-06 RX ADMIN — ALPRAZOLAM 0.5 MG: 0.5 TABLET ORAL at 21:51

## 2022-09-06 RX ADMIN — CEFAZOLIN 2 G: 1 INJECTION, POWDER, FOR SOLUTION INTRAMUSCULAR; INTRAVENOUS at 05:47

## 2022-09-06 RX ADMIN — INDOMETHACIN 100 MG: 50 SUPPOSITORY RECTAL at 13:25

## 2022-09-06 RX ADMIN — MORPHINE SULFATE 1 MG: 2 INJECTION, SOLUTION INTRAMUSCULAR; INTRAVENOUS at 03:49

## 2022-09-06 RX ADMIN — ONDANSETRON HYDROCHLORIDE 4 MG: 2 SOLUTION INTRAMUSCULAR; INTRAVENOUS at 09:06

## 2022-09-06 RX ADMIN — SODIUM CHLORIDE, POTASSIUM CHLORIDE, SODIUM LACTATE AND CALCIUM CHLORIDE 200 ML/HR: 600; 310; 30; 20 INJECTION, SOLUTION INTRAVENOUS at 14:44

## 2022-09-06 RX ADMIN — SODIUM CHLORIDE, POTASSIUM CHLORIDE, SODIUM LACTATE AND CALCIUM CHLORIDE 200 ML/HR: 600; 310; 30; 20 INJECTION, SOLUTION INTRAVENOUS at 21:07

## 2022-09-06 RX ADMIN — METRONIDAZOLE 500 MG: 500 INJECTION, SOLUTION INTRAVENOUS at 14:50

## 2022-09-06 RX ADMIN — DEXAMETHASONE SODIUM PHOSPHATE 8 MG: 4 INJECTION, SOLUTION INTRAMUSCULAR; INTRAVENOUS at 12:45

## 2022-09-06 RX ADMIN — FENTANYL CITRATE 50 MCG: 50 INJECTION, SOLUTION INTRAMUSCULAR; INTRAVENOUS at 13:08

## 2022-09-06 RX ADMIN — SODIUM CHLORIDE 1000 ML: 9 INJECTION, SOLUTION INTRAVENOUS at 09:05

## 2022-09-06 RX ADMIN — ROCURONIUM BROMIDE 10 MG: 10 SOLUTION INTRAVENOUS at 12:39

## 2022-09-06 RX ADMIN — SODIUM CHLORIDE, POTASSIUM CHLORIDE, SODIUM LACTATE AND CALCIUM CHLORIDE: 600; 310; 30; 20 INJECTION, SOLUTION INTRAVENOUS at 12:32

## 2022-09-06 RX ADMIN — ONDANSETRON HYDROCHLORIDE 4 MG: 2 INJECTION, SOLUTION INTRAMUSCULAR; INTRAVENOUS at 12:44

## 2022-09-06 RX ADMIN — ONDANSETRON HYDROCHLORIDE 4 MG: 2 SOLUTION INTRAMUSCULAR; INTRAVENOUS at 14:56

## 2022-09-06 RX ADMIN — ONDANSETRON HYDROCHLORIDE 4 MG: 2 SOLUTION INTRAMUSCULAR; INTRAVENOUS at 17:11

## 2022-09-06 NOTE — PROGRESS NOTES
TRANSFER - OUT REPORT:    Verbal report given to Sean Sanchez RN on [employer]  being transferred to [facility] routine post - op       Report consisted of patients Situation, Background, Assessment and   Recommendations(SBAR). Information from the following report(s) SBAR, Procedure Summary, Recent Results, and Procedure Verification was reviewed with the receiving nurse. Lines:   Peripheral IV 09/05/22 Right Hand (Active)   Site Assessment Clean, dry, & intact 09/05/22 2000   Phlebitis Assessment 0 09/05/22 2000   Infiltration Assessment 0 09/05/22 2000   Dressing Status Clean, dry, & intact 09/05/22 2000   Dressing Type Transparent 09/05/22 2000   Hub Color/Line Status Infusing 09/05/22 2000   Action Taken Open ports on tubing capped 09/05/22 2000   Alcohol Cap Used Yes 09/05/22 2000       Peripheral IV 09/06/22 Left Hand (Active)        Opportunity for questions and clarification was provided.       Patient transported with:   Alo7

## 2022-09-06 NOTE — PROGRESS NOTES
Rapid Response called overhead. RRT responding. RRT arrives to pt room to find primary nurse with pt. Pt presents with chest pain that started 20 minutes ago, heart rate in 130s, and anxious. Pt recently had a procedure today. Hospitalist arrives at bedside shortly after. EKG, Troponin, and chest x-ray obtained. Pt heart rate decreased down to 83 and pt appeared more calm. Primary nurse to give pt morphine and anxiety medications. Pt vitals stable. RRT will continue to follow.

## 2022-09-06 NOTE — PROGRESS NOTES
1737: perfect serve sent to MD \"Hello- pt tolerating ice chips. Can we advance to clear liquids? \"  1832: MD replied \"yes\"  See new orders Maintained on carvedilol, continue

## 2022-09-06 NOTE — PROGRESS NOTES
Hospitalist Night Cover     Name: Tang Edgar  YOB: 1995      Overnight update:        Tang Edgar is a 24yo female with a past medical history of HTN and congenital solitary kidney who is currently admitted to room 546 with cholelithiasis s/p lap cholecystectomy (09/05). Rapid response called for chest pain. Seen and examined patient at bedside. RRT RN at bedside. She states that she is having right sided chest pain, non-radiating. Started about ten minutes ago. Pain increases with expiration. Hyperventilating. Chest pain   - EKG- NSR rate of 74 bpm   - Chest xray pending   - Trop pending   - Xanax 0.5mg x 1 dose now. With patient's permission, spoke with her mother Chantelle Samson on the phone. Mother states that patient has had \"a lot going on\" and possibly could have some increased anxiety. Will give a dose of xanax. 1000- Patient states that pain has improved. VSS   States that she has not passed any flatus since surgery. Encouraged increased movement.       Missy Rendon [facility]-NP

## 2022-09-06 NOTE — PROGRESS NOTES
TRANSFER - IN REPORT:    Verbal report received from [facility] on [employer]  being received from [facility], room 546for ordered procedure      Report consisted of patients Situation, Background, Assessment and   Recommendations(SBAR). Information from the following report(s) SBAR, Pre Procedure Checklist, and Procedure Verification was reviewed with the receiving nurse. Opportunity for questions and clarification was provided. Assessment completed upon patients arrival to unit and care assumed.

## 2022-09-06 NOTE — PROGRESS NOTES
[address] [facility] Adult  Hospitalist Group                                                                                          Hospitalist Progress Note  Evelyn Harrison MD  Answering service: [phone] OR [phone] from in house phone        Date of Service:  2022  NAME:  aDvid Zelaya  :  1995  MRN:  [id number]      Admission Summary:   David Zelaya is a 24 y.o. female with past medical history of hypertension, congenital solitary kidney, hidradenitis suppurativa presented as a direct admission/ transfer [facility] with chief complaints of back pain, abdominal pain, nausea and dark urine. Symptom worsened yesterday but has been ongoing \"on and off\" for several months with RUQ pain radiating to \"middle of back\", constant, severe, dull, aching, aggravated ~ 1 hour after eating certain foods such as diary, salads, and bread, increased with deep breathing, associated with nausea and dark urine. On arrival at [facility], workup included labs showing , , Alkaline phosphatase 120, total bilirubin 4.6. CT abdomen pelvis with IV no acute abdominal or pelvic pathology with cholelithiasis. Patient is now seen on transfer to [facility] for admission to the hospitalist service. Patient was given Dilaudid IV earlier tonight but she now reports itching. Interval history / Subjective: Follow up abdominal pain   RRT overnight for chest pain, responded with xanax  S/p ERCP  Feeling better, wants cie water  Assessment & Plan:     Cholelithiasis  Hyperbilirubinemia/transaminitis  Abdominal pain  -symptomatic cholelithiasis  -MRI abd cholelithiasis no evidence of acute cholecystitis.  No intrahepatic ductal dilatation  -s/p lap melissa   -s/p ERCP   -Appreciate surgery/GI    Hypertension: home meds  Obesity:BMI 34.46 kg/m2: counseling once more stable     Ice chips, advance to clears as tolerated     Code status: FULL CODE  Prophylaxis: heparin    Plan: if remains stable, likely discharge tomorrow  Care Plan discussed with: Patient  Anticipated Disposition: ?tomorrow     Hospital Problems  Date Reviewed: 9/4/2022            Codes Class Noted POA    Choledocholithiasis ICD-10-CM: K80.50  ICD-9-CM: 574.50  9/3/2022 Unknown         Review of Systems:   A comprehensive review of systems was negative except for that written in the HPI. Vital Signs:    Last 24hrs VS reviewed since prior progress note. Most recent are:  Visit Vitals  BP (!) 143/91 (BP 1 Location: Right upper arm, BP Patient Position: At rest;Sitting)   Pulse 88   Temp 98.7 °F (37.1 °C)   Resp 16   SpO2 99%         Intake/Output Summary (Last 24 hours) at 9/6/2022 1307  Last data filed at 9/5/2022 1535  Gross per 24 hour   Intake 500 ml   Output 205 ml   Net 295 ml          Physical Examination:     I had a face to face encounter with this patient and independently examined them on 9/6/2022 as outlined below:          Constitutional:  No acute distress   ENT:  Oral mucosa moist, oropharynx benign. Resp:  CTA bilaterally. No wheezing/rhonchi/rales. No accessory muscle use. CV:  Regular rhythm, normal rate, no murmurs, gallops, rubs    GI:  Soft, non distended, non tender. normoactive bowel sounds, no hepatosplenomegaly     Musculoskeletal:  No edema, warm, 2+ pulses throughout    Neurologic:  Moves all extremities.   AAOx3, CN II-XII reviewed            Data Review:    Review and/or order of clinical lab test      Labs:     Recent Labs     09/05/22 2148 09/05/22  0345   WBC 6.7 3.1*   HGB 15.0 13.2   HCT 43.5 38.6    193       Recent Labs     09/05/22 2148 09/05/22 2141 09/05/22  0345   * 133* 135*   K 4.0 3.6 3.6    102 106   CO2 21 21 24   BUN 6 7 5*   CREA 1.07* 1.15* 0.80   * 107* 88   CA 9.6 9.7 8.7   MG  --   --  2.1       Recent Labs     09/05/22 2148 09/05/22 2141 09/05/22  0345   * 498* 476*   * 151* 113   TBILI 4.9* 4.9* 3.5* TP 8.1 8.9* 6.9   ALB 4.0 4.1 3.2*   GLOB 4.1* 4.8* 3.7       Recent Labs     09/05/22  0345   INR 1.0   PTP 10.7   APTT 28.3        No results for input(s): FE, TIBC, PSAT, FERR in the last 72 hours. No results found for: FOL, RBCF   No results for input(s): PH, PCO2, PO2 in the last 72 hours. No results for input(s): CPK, CKNDX, TROIQ in the last 72 hours.     No lab exists for component: CPKMB  Lab Results   Component Value Date/Time    Cholesterol, total 248 (H) 08/30/2017 01:39 PM    HDL Cholesterol 48 08/30/2017 01:39 PM    LDL, calculated 168 (H) 08/30/2017 01:39 PM    Triglyceride 161 (H) 08/30/2017 01:39 PM     No results found for: [facility]  Lab Results   Component Value Date/Time    Color DARK YELLOW 09/03/2022 02:06 PM    Appearance HAZY (A) 09/03/2022 02:06 PM    Specific gravity 1.020 09/03/2022 02:06 PM    Specific gravity 1.024 02/07/2022 01:23 PM    pH (UA) 6.0 09/03/2022 02:06 PM    Protein Negative 09/03/2022 02:06 PM    Glucose Negative 09/03/2022 02:06 PM    Ketone Negative 09/03/2022 02:06 PM    Bilirubin Negative 02/07/2022 01:23 PM    Urobilinogen 1.0 09/03/2022 02:06 PM    Nitrites Negative 09/03/2022 02:06 PM    Leukocyte Esterase SMALL (A) 09/03/2022 02:06 PM    Epithelial cells MANY (A) 09/03/2022 02:06 PM    Bacteria Negative 09/03/2022 02:06 PM    WBC 5-10 09/03/2022 02:06 PM    RBC 0-5 09/03/2022 02:06 PM         Medications Reviewed:     Current Facility-Administered Medications   Medication Dose Route Frequency    sodium chloride (NS) flush 5-40 mL  5-40 mL IntraVENous Q8H    sodium chloride (NS) flush 5-40 mL  5-40 mL IntraVENous PRN    midazolam (VERSED) injection 0.25-5 mg  0.25-5 mg IntraVENous Multiple    fentaNYL citrate (PF) injection  mcg   mcg IntraVENous Multiple    naloxone (NARCAN) injection 0.4 mg  0.4 mg IntraVENous Multiple    flumazeniL (ROMAZICON) 0.1 mg/mL injection 0.2 mg  0.2 mg IntraVENous Multiple    simethicone (MYLICON) 54VL/2.1RK oral drops 80 mg  1.2 mL Oral Multiple    atropine injection 0.5 mg  0.5 mg IntraVENous ONCE PRN    EPINEPHrine (ADRENALIN) 0.1 mg/mL syringe 1 mg  1 mg Endoscopically ONCE PRN    glucagon (GLUCAGEN) injection 1 mg  1 mg IntraVENous ONCE PRN    iopamidoL (ISOVUE 300) 61 % contrast injection 50 mL  50 mL Other ONCE    glucagon (GLUCAGEN) injection 1 mg  1 mg IntraVENous ONCE    EPINEPHrine (ADRENALIN) 0.1 mg/mL syringe 1 mg  1 mg Other ONCE    atropine injection 0.4 mg  0.4 mg IntraVENous ONCE    ceFAZolin (ANCEF) 2 g in sterile water (preservative free) 20 mL IV syringe  2 g IntraVENous Q8H    lactated Ringers infusion  25 mL/hr IntraVENous CONTINUOUS    sodium chloride (NS) flush 5-40 mL  5-40 mL IntraVENous Q8H    sodium chloride (NS) flush 5-40 mL  5-40 mL IntraVENous PRN    polyethylene glycol (MIRALAX) packet 17 g  17 g Oral DAILY PRN    morphine injection 1 mg  1 mg IntraVENous Q4H PRN    0.45% sodium chloride infusion  75 mL/hr IntraVENous CONTINUOUS    acetaminophen (TYLENOL) tablet 1,000 mg  1,000 mg Oral Q6H PRN    oxyCODONE IR (ROXICODONE) tablet 5 mg  5 mg Oral Q4H PRN    oxyCODONE IR (ROXICODONE) tablet 10 mg  10 mg Oral Q4H PRN    ondansetron (ZOFRAN ODT) tablet 4 mg  4 mg Oral Q6H PRN    metroNIDAZOLE (FLAGYL) IVPB premix 500 mg  500 mg IntraVENous Q12H    ondansetron (ZOFRAN) injection 4 mg  4 mg IntraVENous Q4H PRN    ondansetron (ZOFRAN) injection 4 mg  4 mg IntraVENous Q8H     ______________________________________________________________________  EXPECTED LENGTH OF STAY: - - -  ACTUAL LENGTH OF STAY:          3                 Shirley Melo MD

## 2022-09-06 NOTE — PROGRESS NOTES
Malgorzata Agudelo 272  174 [facility], [address]       GI PROGRESS NOTE  [facility]  [phone] NP in-hospital cell phone M-F until 4:30  After 5pm or on weekends, please call  for physician on call      NAME: Cristopher Sanches   :  1995   MRN:  [id number]       Subjective:   RRT called overnight for chest pain, resolved. She's feeling well, some periumbilical pain. Objective:     VITALS:   Last 24hrs VS reviewed since prior progress note. Most recent are:  Visit Vitals  BP (!) 143/91 (BP 1 Location: Right upper arm, BP Patient Position: At rest;Sitting)   Pulse 88   Temp 98.7 °F (37.1 °C)   Resp 16   SpO2 99%       PHYSICAL EXAM:  General: Cooperative, no acute distress    Neurologic:  Alert and oriented X 3. HEENT: EOMI, no scleral icterus   Lungs:  No increased WOB  Heart:  Regular rate  Abdomen: Soft, non-distended, minimal tenderness. Extremities: warm  Psych:   Good insight. Not anxious or agitated. Lab Data Reviewed:     Recent Results (from the past 24 hour(s))   METABOLIC PANEL, COMPREHENSIVE    Collection Time: 22  9:41 PM   Result Value Ref Range    Sodium 133 (L) 136 - 145 mmol/L    Potassium 3.6 3.5 - 5.1 mmol/L    Chloride 102 97 - 108 mmol/L    CO2 21 21 - 32 mmol/L    Anion gap 10 5 - 15 mmol/L    Glucose 107 (H) 65 - 100 mg/dL    BUN 7 6 - 20 MG/DL    Creatinine 1.15 (H) 0.55 - 1.02 MG/DL    BUN/Creatinine ratio 6 (L) 12 - 20      GFR est AA >60 >60 ml/min/1.73m2    GFR est non-AA 57 (L) >60 ml/min/1.73m2    Calcium 9.7 8.5 - 10.1 MG/DL    Bilirubin, total 4.9 (H) 0.2 - 1.0 MG/DL    ALT (SGPT) 498 (H) 12 - 78 U/L    AST (SGOT) 186 (H) 15 - 37 U/L    Alk.  phosphatase 151 (H) 45 - 117 U/L    Protein, total 8.9 (H) 6.4 - 8.2 g/dL    Albumin 4.1 3.5 - 5.0 g/dL    Globulin 4.8 (H) 2.0 - 4.0 g/dL    A-G Ratio 0.9 (L) 1.1 - 2.2     TROPONIN-HIGH SENSITIVITY    Collection Time: 22  9:41 PM   Result Value Ref Range Troponin-High Sensitivity <4 0 - 51 ng/L   EKG, 12 LEAD, INITIAL    Collection Time: 09/05/22  9:46 PM   Result Value Ref Range    Ventricular Rate 74 BPM    Atrial Rate 74 BPM    P-R Interval 136 ms    QRS Duration 88 ms    Q-T Interval 418 ms    QTC Calculation (Bezet) 463 ms    Calculated P Axis 25 degrees    Calculated R Axis 30 degrees    Calculated T Axis 5 degrees    Diagnosis       Normal sinus rhythm  When compared with ECG of 07-FEB-2022 15:28,  Vent. rate has decreased BY  45 BPM     SAMPLES BEING HELD    Collection Time: 09/05/22  9:48 PM   Result Value Ref Range    SAMPLES BEING HELD 1 red 1lav 1blue 1pst     COMMENT        Add-on orders for these samples will be processed based on acceptable specimen integrity and analyte stability, which may vary by analyte. CBC WITH AUTOMATED DIFF    Collection Time: 09/05/22  9:48 PM   Result Value Ref Range    WBC 6.7 3.6 - 11.0 K/uL    RBC 5.25 (H) 3.80 - 5.20 M/uL    HGB 15.0 11.5 - 16.0 g/dL    HCT 43.5 35.0 - 47.0 %    MCV 82.9 80.0 - 99.0 FL    MCH 28.6 26.0 - 34.0 PG    MCHC 34.5 30.0 - 36.5 g/dL    RDW 12.2 11.5 - 14.5 %    PLATELET 010 551 - 382 K/uL    MPV 11.3 8.9 - 12.9 FL    NRBC 0.0 0  WBC    ABSOLUTE NRBC 0.00 0.00 - 0.01 K/uL    NEUTROPHILS 77 (H) 32 - 75 %    LYMPHOCYTES 16 12 - 49 %    MONOCYTES 7 5 - 13 %    EOSINOPHILS 0 0 - 7 %    BASOPHILS 0 0 - 1 %    IMMATURE GRANULOCYTES 0 0.0 - 0.5 %    ABS. NEUTROPHILS 5.1 1.8 - 8.0 K/UL    ABS. LYMPHOCYTES 1.1 0.8 - 3.5 K/UL    ABS. MONOCYTES 0.5 0.0 - 1.0 K/UL    ABS. EOSINOPHILS 0.0 0.0 - 0.4 K/UL    ABS. BASOPHILS 0.0 0.0 - 0.1 K/UL    ABS. IMM.  GRANS. 0.0 0.00 - 0.04 K/UL    DF AUTOMATED     METABOLIC PANEL, COMPREHENSIVE    Collection Time: 09/05/22  9:48 PM   Result Value Ref Range    Sodium 135 (L) 136 - 145 mmol/L    Potassium 4.0 3.5 - 5.1 mmol/L    Chloride 102 97 - 108 mmol/L    CO2 21 21 - 32 mmol/L    Anion gap 12 5 - 15 mmol/L    Glucose 105 (H) 65 - 100 mg/dL    BUN 6 6 - 20 MG/DL Creatinine 1.07 (H) 0.55 - 1.02 MG/DL    BUN/Creatinine ratio 6 (L) 12 - 20      GFR est AA >60 >60 ml/min/1.73m2    GFR est non-AA >60 >60 ml/min/1.73m2    Calcium 9.6 8.5 - 10.1 MG/DL    Bilirubin, total 4.9 (H) 0.2 - 1.0 MG/DL    ALT (SGPT) 501 (H) 12 - 78 U/L    AST (SGOT) 182 (H) 15 - 37 U/L    Alk. phosphatase 148 (H) 45 - 117 U/L    Protein, total 8.1 6.4 - 8.2 g/dL    Albumin 4.0 3.5 - 5.0 g/dL    Globulin 4.1 (H) 2.0 - 4.0 g/dL    A-G Ratio 1.0 (L) 1.1 - 2.2              Assessment:     Choledocholithiasis status post lap melissa 9/5/22 with abnormal IOC     Patient Active Problem List   Diagnosis Code    Severe obesity ([facility] [address] E66.01    Migraine without status migrainosus, not intractable G43.909    Hirsutism L68.0    11 weeks gestation of pregnancy Z3A.11    Choledocholithiasis K80.50     Plan:     NPO  On antibiotics   Plan for ERCP later today with Dr. Pablito Espinosa, risks discussed and she is agreeable     Signed By: Wade Disla NP     9/6/2022  10:02 AM          This patient was seen and examined by me in a face-to-face visit today. I reviewed the medical record including lab work, imaging and other provider notes. I confirmed the interval history as described above. I spoke to the patient, discussing our findings and plans. I discussed this case in detail with Maria Del Carmen Olivares NP. I formulated an updated  assessment of this patient and guided our treatment plan. I agree with the above progress note. I agree with the history, exam and assessment and plan as outlined in the note. I would like to add the following: Patient seen and examined. Positive IOC during cholecystectomy. Tbili 4.9. Plan for ERCP today. Discussed procedure details and risks, which included, but are not limited to, risks of GA, bleeding, infection, perforation, and pancreatitis. She expressed understanding and agreed to proceed. Allen Espinosa MD

## 2022-09-06 NOTE — PROCEDURES
[address], [address] [place] [address]       NAME:  Allen Aldana   :   1995   MRN:   [id number]       Procedure Type:   ERCP with biliary sphincterotomy, biliary stone removal     Indications: common bile duct stone, biliary obstruction  Pre-operative Diagnosis: see indication above  Post-operative Diagnosis:  See findings below  : Nel Engle. Pablito Espinosa MD  Staff: Endoscopy True Taylor: Kofi Caldwell  Endoscopy RN-1: uRddy Luis RN     Referring Provider:    Carlton York MD, Mary Larios MD      Sedation:  General anesthesia    Procedure Details:  After informed consent was obtained with all risks and benefits of procedure explained, the patient was taken to the fluoroscopy suite and placed in the prone position. Upon sequential sedation as per above, the Olympus duodenoscope [id number]   was inserted via the mouthpiece and carefully advanced to the second portion of the duodenum. The quality of visualization was good. The duodenoscope was withdrawn into a short position. Findings:   Esophagus:normal  Stomach: normal   Duodenum: normal, spontaneous biliary drainage was not seen. ERCP: A  film was taken. Surgical clips were seen from prior cholecystectomy. The ampulla was normal appearing. Using a [employer] [id number] Jagtome preloaded with a 0.025 inch Jagwire, the bile duct was cannulated with the guidewire. The Karely Kathrine was then advanced over the guidewire. Bile was aspirated to confirm proper positioning. Limited contrast was injected under fluoroscopic visualization. The bile duct was not dilated. There was not an obvious filling defect on the cholangiogram, but there appeared to be a narrowing in the distal common bile duct. Next,a 9-10 mm biliary sphincterotomy was effected. There was no post-sphincterotomy bleeding. Next, a 9 mm to 12 mm biliary extraction balloon was used to sweep the bile duct multiple times.  A small stone was extracted. A occlusion cholangiogram was negative. There was adequate drainage of contrast and bile. The pancreatic duct was neither entered or injected during this procedure. I performed all immediate radiologic interpretation during this procedure. Specimen Removed:  none    Complications: None. EBL:  None. Interventions:    Pancreatic: see above  Biliary: see above    Impression:   Choledocholithiasis - removed with biliary sphincterotomy and balloon extraction    Recommendations:      1. Watch for complications, including cholangitis, pancreatitis, bleeding, and perforation. 2. IV  cc/h overnight - high risk for post-ERCP pancreatitis (young female patient). Rectal Indomethacin administered. 3. PRN pain medication and anti-emetics  4. NPO for now. Clear liquid diet if patient is pain free. 5. If patient has progressive abdominal pain and/or change in abdominal exam, please check amylase, lipase, CBC, CMP, and upright KUB. 6. Inpatient GI service to follow      Signed By: He Hutchinson.  Av Sears MD     9/6/2022  1:22 PM

## 2022-09-06 NOTE — PROGRESS NOTES
Surgery Progress Note    9/6/2022    Admit Date: 9/3/2022    CC: Nausea    9/5-Lap melissa with IOC      Subjective:     Chest pain event resolved. Abd pain controlled. No acute issues this AM.    Constitutional: No fever or chills  Neurologic: No headache  Eyes: No scleral icterus or irritated eyes  Nose: No nasal pain or drainage  Mouth: No oral lesions or sore throat  Cardiac: No palpations or chest pain  Pulmonary: No cough or shortness of breath  Gastrointestinal: Abd pain, no emesis, diarrhea, or constipation  Genitourinary: No dysuria  Musculoskeletal: No muscle or joint tenderness  Skin: No rashes or lesions  Psychiatric: No anxiety or depressed mood    Objective:   Visit Vitals  /77 (BP 1 Location: Right upper arm, BP Patient Position: At rest;Lying)   Pulse 97   Temp 98.1 °F (36.7 °C)   Resp 20   SpO2 98%       General: No acute distress, conversant  Eyes: PERRLA, no scleral icterus  HENT: Normocephalic without oral lesions  Neck: Trachea midline without LAD  Cardiac: Normal pulse rate and rhythm  Pulmonary: Symmetric chest rise with normal effort  GI: Soft, ATTP, wounds cdi  Skin: Warm without rash  Extremities: No edema or joint stiffness  Psych: Appropriate mood and affect    Labs, vital signs, and I/O reviewed. Assessment:     72-year-old female with choledocholithiasis confirmed after lap melissa    Plan:     PRN pain control  NS bolus ordered for slight SILVANA. Hgb concentrated  NPO. Clears after ERCP  ERCP hopefully today for CBD stone and biliary obstruction confirmed on IOC  Trend T bili  Cont ancef and flagyl  Resume lovenox after ERCP if safe per GI.       Tamara Cabral MD  Bariatric and General Surgeon  [facility]

## 2022-09-06 NOTE — ANESTHESIA PREPROCEDURE EVALUATION
Relevant Problems   No relevant active problems       Anesthetic History   No history of anesthetic complications            Review of Systems / Medical History  Patient summary reviewed, nursing notes reviewed and pertinent labs reviewed    Pulmonary  Within defined limits                 Neuro/Psych   Within defined limits           Cardiovascular    Hypertension: well controlled              Exercise tolerance: >4 METS     GI/Hepatic/Renal                Endo/Other             Other Findings            Physical Exam    Airway  Mallampati: I  TM Distance: > 6 cm  Neck ROM: normal range of motion   Mouth opening: Normal     Cardiovascular    Rhythm: regular           Dental  No notable dental hx       Pulmonary  Breath sounds clear to auscultation               Abdominal         Other Findings            Anesthetic Plan    ASA: 2  Anesthesia type: general          Induction: Intravenous  Anesthetic plan and risks discussed with: Patient

## 2022-09-07 LAB
ALBUMIN SERPL-MCNC: 3.2 G/DL (ref 3.5–5)
ALBUMIN/GLOB SERPL: 1 {RATIO} (ref 1.1–2.2)
ALP SERPL-CCNC: 130 U/L (ref 45–117)
ALT SERPL-CCNC: 328 U/L (ref 12–78)
ANION GAP SERPL CALC-SCNC: 7 MMOL/L (ref 5–15)
AST SERPL-CCNC: 148 U/L (ref 15–37)
BASOPHILS # BLD: 0 K/UL (ref 0–0.1)
BASOPHILS NFR BLD: 1 % (ref 0–1)
BILIRUB SERPL-MCNC: 2.5 MG/DL (ref 0.2–1)
BUN SERPL-MCNC: 6 MG/DL (ref 6–20)
BUN/CREAT SERPL: 9 (ref 12–20)
CALCIUM SERPL-MCNC: 9.1 MG/DL (ref 8.5–10.1)
CHLORIDE SERPL-SCNC: 105 MMOL/L (ref 97–108)
CO2 SERPL-SCNC: 25 MMOL/L (ref 21–32)
CREAT SERPL-MCNC: 0.69 MG/DL (ref 0.55–1.02)
DIFFERENTIAL METHOD BLD: ABNORMAL
EBV NA IGG SER-ACNC: 192 U/ML (ref 0–17.9)
EBV VCA IGG SER-ACNC: >600 U/ML (ref 0–17.9)
EBV VCA IGM SER-ACNC: <36 U/ML (ref 0–35.9)
EOSINOPHIL # BLD: 0.1 K/UL (ref 0–0.4)
EOSINOPHIL NFR BLD: 2 % (ref 0–7)
ERYTHROCYTE [DISTWIDTH] IN BLOOD BY AUTOMATED COUNT: 12.3 % (ref 11.5–14.5)
GLOBULIN SER CALC-MCNC: 3.2 G/DL (ref 2–4)
GLUCOSE SERPL-MCNC: 86 MG/DL (ref 65–100)
HCT VFR BLD AUTO: 34.5 % (ref 35–47)
HGB BLD-MCNC: 12 G/DL (ref 11.5–16)
HSV1+2 IGM SER IA-ACNC: <0.91 RATIO (ref 0–0.9)
IMM GRANULOCYTES # BLD AUTO: 0 K/UL (ref 0–0.04)
IMM GRANULOCYTES NFR BLD AUTO: 1 % (ref 0–0.5)
INTERPRETATION, 169995: ABNORMAL
LYMPHOCYTES # BLD: 1.6 K/UL (ref 0.8–3.5)
LYMPHOCYTES NFR BLD: 28 % (ref 12–49)
MCH RBC QN AUTO: 28.8 PG (ref 26–34)
MCHC RBC AUTO-ENTMCNC: 34.8 G/DL (ref 30–36.5)
MCV RBC AUTO: 82.7 FL (ref 80–99)
MONOCYTES # BLD: 0.6 K/UL (ref 0–1)
MONOCYTES NFR BLD: 11 % (ref 5–13)
NEUTS SEG # BLD: 3.3 K/UL (ref 1.8–8)
NEUTS SEG NFR BLD: 58 % (ref 32–75)
NRBC # BLD: 0 K/UL (ref 0–0.01)
NRBC BLD-RTO: 0 PER 100 WBC
PLATELET # BLD AUTO: 188 K/UL (ref 150–400)
PMV BLD AUTO: 11.7 FL (ref 8.9–12.9)
POTASSIUM SERPL-SCNC: 4 MMOL/L (ref 3.5–5.1)
PROT SERPL-MCNC: 6.4 G/DL (ref 6.4–8.2)
RBC # BLD AUTO: 4.17 M/UL (ref 3.8–5.2)
SMA IGG SER-ACNC: 42 UNITS (ref 0–19)
SODIUM SERPL-SCNC: 137 MMOL/L (ref 136–145)
WBC # BLD AUTO: 5.6 K/UL (ref 3.6–11)

## 2022-09-07 PROCEDURE — 74011250636 HC RX REV CODE- 250/636: Performed by: SURGERY

## 2022-09-07 PROCEDURE — 74011000250 HC RX REV CODE- 250: Performed by: SURGERY

## 2022-09-07 PROCEDURE — 74011250637 HC RX REV CODE- 250/637: Performed by: HOSPITALIST

## 2022-09-07 PROCEDURE — 99024 POSTOP FOLLOW-UP VISIT: CPT | Performed by: SURGERY

## 2022-09-07 PROCEDURE — 85025 COMPLETE CBC W/AUTO DIFF WBC: CPT

## 2022-09-07 PROCEDURE — 65270000029 HC RM PRIVATE

## 2022-09-07 PROCEDURE — 74011250637 HC RX REV CODE- 250/637: Performed by: SURGERY

## 2022-09-07 PROCEDURE — 36415 COLL VENOUS BLD VENIPUNCTURE: CPT

## 2022-09-07 PROCEDURE — 74011250636 HC RX REV CODE- 250/636: Performed by: INTERNAL MEDICINE

## 2022-09-07 PROCEDURE — 80053 COMPREHEN METABOLIC PANEL: CPT

## 2022-09-07 RX ORDER — ENOXAPARIN SODIUM 100 MG/ML
30 INJECTION SUBCUTANEOUS EVERY 12 HOURS
Status: DISCONTINUED | OUTPATIENT
Start: 2022-09-07 | End: 2022-09-08 | Stop reason: HOSPADM

## 2022-09-07 RX ORDER — CALCIUM CARBONATE 200(500)MG
200 TABLET,CHEWABLE ORAL
Status: DISCONTINUED | OUTPATIENT
Start: 2022-09-07 | End: 2022-09-08 | Stop reason: HOSPADM

## 2022-09-07 RX ADMIN — ACETAMINOPHEN 1000 MG: 500 TABLET, FILM COATED ORAL at 17:02

## 2022-09-07 RX ADMIN — SODIUM CHLORIDE, POTASSIUM CHLORIDE, SODIUM LACTATE AND CALCIUM CHLORIDE 200 ML/HR: 600; 310; 30; 20 INJECTION, SOLUTION INTRAVENOUS at 02:32

## 2022-09-07 RX ADMIN — METRONIDAZOLE 500 MG: 500 INJECTION, SOLUTION INTRAVENOUS at 02:32

## 2022-09-07 RX ADMIN — ONDANSETRON HYDROCHLORIDE 4 MG: 2 SOLUTION INTRAMUSCULAR; INTRAVENOUS at 02:32

## 2022-09-07 RX ADMIN — HYDROCHLOROTHIAZIDE 12.5 MG: 25 TABLET ORAL at 08:54

## 2022-09-07 RX ADMIN — LISINOPRIL 10 MG: 10 TABLET ORAL at 08:54

## 2022-09-07 RX ADMIN — CEFAZOLIN 2 G: 1 INJECTION, POWDER, FOR SOLUTION INTRAMUSCULAR; INTRAVENOUS at 05:00

## 2022-09-07 RX ADMIN — CALCIUM CARBONATE 200 MG: 500 TABLET, CHEWABLE ORAL at 11:30

## 2022-09-07 RX ADMIN — ENOXAPARIN SODIUM 30 MG: 100 INJECTION SUBCUTANEOUS at 21:40

## 2022-09-07 RX ADMIN — ENOXAPARIN SODIUM 30 MG: 100 INJECTION SUBCUTANEOUS at 11:30

## 2022-09-07 NOTE — PROGRESS NOTES
Surgery Progress Note    9/7/2022    Admit Date: 9/3/2022    CC: Nausea    9/5-Lap melissa with IOC  9/6 ERCP      Subjective: Tolerating clears. Hungry. Awaiting labs. Constitutional: No fever or chills  Neurologic: No headache  Eyes: No scleral icterus or irritated eyes  Nose: No nasal pain or drainage  Mouth: No oral lesions or sore throat  Cardiac: No palpations or chest pain  Pulmonary: No cough or shortness of breath  Gastrointestinal: Abd pain, no emesis, diarrhea, or constipation  Genitourinary: No dysuria  Musculoskeletal: No muscle or joint tenderness  Skin: No rashes or lesions  Psychiatric: No anxiety or depressed mood    Objective:   Visit Vitals  /79 (BP 1 Location: Right upper arm, BP Patient Position: At rest)   Pulse 63   Temp 98 °F (36.7 °C)   Resp 16   Ht 5' 7\" (1.702 m)   Wt 238 lb 5.1 oz (108.1 kg)   SpO2 99%   BMI 37.33 kg/m²       General: No acute distress, conversant  Eyes: PERRLA, no scleral icterus  HENT: Normocephalic without oral lesions  Neck: Trachea midline without LAD  Cardiac: Normal pulse rate and rhythm  Pulmonary: Symmetric chest rise with normal effort  GI: Soft, ATTP, wounds cdi  Skin: Warm without rash  Extremities: No edema or joint stiffness  Psych: Appropriate mood and affect    Labs, vital signs, and I/O reviewed. Assessment:     22-year-old female with choledocholithiasis confirmed after lap melissa s/p ERCP    Plan:     PRN pain control  Follow up CMP  Clears.  ADAT per GI  Follow up T bili  Stop ABX  Resume lovenox pending labs        Deacon Avina MD  Bariatric and General Surgeon  [facility]

## 2022-09-07 NOTE — PROGRESS NOTES
Pharmacist Review and Automatic Dose Adjustment of Prophylactic Enoxaparin    *Review reason for admission/hospital problem list*    The reviewing pharmacist has made an adjustment to the ordered enoxaparin dose or converted to UFH per the approved [facility] protocol and table as identified below. Flash Harris is a 24 y.o. female. No lab exists for component: CREATININE    Estimated Creatinine Clearance: 155.5 mL/min (by C-G formula based on SCr of 0.69 mg/dL).     Height:   Ht Readings from Last 1 Encounters:   09/07/22 170.2 cm (67\")     Weight:  Wt Readings from Last 1 Encounters:   09/07/22 108.1 kg (238 lb 5.1 oz)               Plan: Based upon the patient's weight and renal function, the ordered enoxaparin dose of 40mg q24h has been changed/converted to 30 mg q 12 h       Thank you,  Khris Godwin [place]

## 2022-09-07 NOTE — PROGRESS NOTES
ROSI:    RUR 7%    Disposition: Home with children. Transportation: Mother    Follow up: PCP/Specialist    Primary contact: Shawn Saab [phone]    Care Management Interventions  PCP Verified by CM: Yes  Palliative Care Criteria Met (RRAT>21 & CHF Dx)?: No  Mode of Transport at Discharge:  (Mother)  Nissa Loser: Yes  Discharge Durable Medical Equipment: No  Physical Therapy Consult: No  Occupational Therapy Consult: No  Speech Therapy Consult: No  Support Systems: Child(angela), Parent(s)  Confirm Follow Up Transport: Family  The Patient and/or Patient Representative was Provided with a Choice of Provider and Agrees with the Discharge Plan?: Yes  Freedom of Choice List was Provided with Basic Dialogue that Supports the Patient's Individualized Plan of Care/Goals, Treatment Preferences and Shares the Quality Data Associated with the Providers?: No  Discharge Location  Patient Expects to be Discharged to[de-identified] Home with family assistance    Reason for Admission:  Back pain, abdominal pain                     RUR Score:    7%                 Plan for utilizing home health:     No orders     PCP: First and Last name:  Flaquita York MD     Name of Practice:    Are you a current patient: Yes/No: Yes   Approximate date of last visit: 1 year ago   Can you participate in a virtual visit with your PCP: Yes                    Current Advanced Directive/Advance Care Plan: Full Code      Healthcare Decision Maker:   Click here to complete [address] including selection of the Healthcare Decision Maker Relationship (ie \"Primary\")                             Transition of Care Plan:      CM met with patient to introduce CM role, verify demographics and begin discharge planning. Patient lives in an apartment with her children. She is independent at home and drives. [facility]     DME:None    No history of HH or Rehab    Insurance: [employer] primary and [employer] secondary.     Selvin Silva RN/CRM  [phone]

## 2022-09-07 NOTE — PROGRESS NOTES
[address]  [address] [facility], [address]       GI PROGRESS NOTE        NAME: Tomas Pathak   :  1995   MRN:  [id number]       Subjective:   No abdominal pain, bloody bowel movements, nausea, vomiting. Pt with flatus. Tolerating CLD. Objective:     VITALS:   Last 24hrs VS reviewed since prior progress note. Most recent are:  Visit Vitals  BP (!) 155/100 (BP 1 Location: Left upper arm, BP Patient Position: At rest;Sitting)   Pulse 73   Temp 98.1 °F (36.7 °C)   Resp 16   Ht 5' 7\" (1.702 m)   Wt 108.1 kg (238 lb 5.1 oz)   SpO2 100%   BMI 37.33 kg/m²       PHYSICAL EXAM:  General: Cooperative, no acute distress    Neurologic:  Alert and oriented X 3. HEENT: EOMI, no scleral icterus   Lungs:  No increased WOB  Heart:  Regular rate  Abdomen: Soft, non-distended, no tenderness, no rebound/guarding. Extremities: warm  Psych:   Good insight. Not anxious or agitated. Lab Data Reviewed:     No results found for this or any previous visit (from the past 24 hour(s)). Assessment:     Choledocholithiasis status post lap melissa 22 with abnormal IOC. S/p ERCP. Doing well after procedure.      Patient Active Problem List   Diagnosis Code    Severe obesity ([facility] [address] E66.01    Migraine without status migrainosus, not intractable G43.909    Hirsutism L68.0    11 weeks gestation of pregnancy Z3A.11    Choledocholithiasis K80.50     Plan:     FLD  Labs pending  If she continues to do well, advance diet as tolerated tomorrow     Signed By: Quentin Damian MD     2022  10:02 AM

## 2022-09-07 NOTE — PROGRESS NOTES
[facility]                                                                                          Hospitalist Progress Note  Matias Ponce MD  Answering service: [phone] OR [phone] from in house phone        Date of Service:  2022  NAME:  Rema Juarez  :  1995  MRN:  [id number]      Admission Summary:   Rema Juarez is a 24 y.o. female with past medical history of hypertension, congenital solitary kidney, hidradenitis suppurativa presented as a direct admission/ transfer from [facility] ED ([facility] to [facility]) with chief complaints of back pain, abdominal pain, nausea and dark urine. Symptom worsened yesterday but has been ongoing \"on and off\" for several months with RUQ pain radiating to \"middle of back\", constant, severe, dull, aching, aggravated ~ 1 hour after eating certain foods such as diary, salads, and bread, increased with deep breathing, associated with nausea and dark urine. On arrival at [facility], workup included labs showing , , Alkaline phosphatase 120, total bilirubin 4.6. CT abdomen pelvis with IV no acute abdominal or pelvic pathology with cholelithiasis. Patient is now seen on transfer to [facility] for admission to the hospitalist service. Patient was given Dilaudid IV earlier tonight but she now reports itching. Interval history / Subjective: Follow up abdominal pain   S/p lap melissa 9/5  S/p ERCP 9/6  Feeling better  Pain controlled  Assessment & Plan:     Cholelithiasis  Hyperbilirubinemia/transaminitis--improving  Abdominal pain--improving  -symptomatic cholelithiasis  -MRI abd cholelithiasis no evidence of acute cholecystitis.  No intrahepatic ductal dilatation  -s/p lap melissa 9/5  -s/p ERCP /6  -Tolerating full liquids  -Appreciate surgery  -Appreciate GI, advance diet tomorrow if doing well    Hypertension: home meds  Obesity:BMI 34.46 kg/m2: counseling once more stable     Ice chips, advance to clears as tolerated     Code status: FULL CODE  Prophylaxis: lovenox    Plan: if remains stable, likely discharge tomorrow  Care Plan discussed with: Patient  Anticipated Disposition: ?tomorrow     Hospital Problems  Date Reviewed: 9/4/2022            Codes Class Noted POA    Choledocholithiasis ICD-10-CM: K80.50  ICD-9-CM: 574.50  9/3/2022 Unknown         Review of Systems:   A comprehensive review of systems was negative except for that written in the HPI. Vital Signs:    Last 24hrs VS reviewed since prior progress note. Most recent are:  Visit Vitals  BP (!) 155/100 (BP 1 Location: Left upper arm, BP Patient Position: At rest;Sitting)   Pulse 73   Temp 98.1 °F (36.7 °C)   Resp 16   Ht 5' 7\" (1.702 m)   Wt 108.1 kg (238 lb 5.1 oz)   SpO2 100%   BMI 37.33 kg/m²         Intake/Output Summary (Last 24 hours) at 9/7/2022 1154  Last data filed at 9/7/2022 6121  Gross per 24 hour   Intake 2980 ml   Output --   Net 2980 ml          Physical Examination:     I had a face to face encounter with this patient and independently examined them on 9/7/2022 as outlined below:          Constitutional:  No acute distress   ENT:  Oral mucosa moist, oropharynx benign. Resp:  CTA bilaterally. No wheezing/rhonchi/rales. No accessory muscle use. CV:  Regular rhythm, normal rate, no murmurs, gallops, rubs    GI:  Soft, non distended, non tender. normoactive bowel sounds, no hepatosplenomegaly     Musculoskeletal:  No edema, warm, 2+ pulses throughout    Neurologic:  Moves all extremities.   AAOx3, CN II-XII reviewed            Data Review:    Review and/or order of clinical lab test      Labs:     Recent Labs     09/07/22 0711 09/05/22 2148   WBC 5.6 6.7   HGB 12.0 15.0   HCT 34.5* 43.5    259       Recent Labs     09/07/22  0711 09/05/22 2148 09/05/22 2141 09/05/22  0345    135* 133* 135*   K 4.0 4.0 3.6 3.6    102 102 106   CO2 25 21 21 24   BUN 6 6 7 5*   CREA 0.69 1.07* 1.15* 0.80   GLU 86 105* 107* 88   CA 9.1 9.6 9.7 8.7   MG  --   --   --  2.1       Recent Labs     09/07/22  0711 09/05/22  2148 09/05/22  2141   * 501* 498*   * 148* 151*   TBILI 2.5* 4.9* 4.9*   TP 6.4 8.1 8.9*   ALB 3.2* 4.0 4.1   GLOB 3.2 4.1* 4.8*       Recent Labs     09/05/22  0345   INR 1.0   PTP 10.7   APTT 28.3        No results for input(s): FE, TIBC, PSAT, FERR in the last 72 hours. No results found for: FOL, RBCF   No results for input(s): PH, PCO2, PO2 in the last 72 hours. No results for input(s): CPK, CKNDX, TROIQ in the last 72 hours.     No lab exists for component: CPKMB  Lab Results   Component Value Date/Time    Cholesterol, total 248 (H) 08/30/2017 01:39 PM    HDL Cholesterol 48 08/30/2017 01:39 PM    LDL, calculated 168 (H) 08/30/2017 01:39 PM    Triglyceride 161 (H) 08/30/2017 01:39 PM     No results found for: [facility]  Lab Results   Component Value Date/Time    Color DARK YELLOW 09/03/2022 02:06 PM    Appearance HAZY (A) 09/03/2022 02:06 PM    Specific gravity 1.020 09/03/2022 02:06 PM    Specific gravity 1.024 02/07/2022 01:23 PM    pH (UA) 6.0 09/03/2022 02:06 PM    Protein Negative 09/03/2022 02:06 PM    Glucose Negative 09/03/2022 02:06 PM    Ketone Negative 09/03/2022 02:06 PM    Bilirubin Negative 02/07/2022 01:23 PM    Urobilinogen 1.0 09/03/2022 02:06 PM    Nitrites Negative 09/03/2022 02:06 PM    Leukocyte Esterase SMALL (A) 09/03/2022 02:06 PM    Epithelial cells MANY (A) 09/03/2022 02:06 PM    Bacteria Negative 09/03/2022 02:06 PM    WBC 5-10 09/03/2022 02:06 PM    RBC 0-5 09/03/2022 02:06 PM         Medications Reviewed:     Current Facility-Administered Medications   Medication Dose Route Frequency    enoxaparin (LOVENOX) injection 30 mg  30 mg SubCUTAneous Q12H    calcium carbonate (TUMS) chewable tablet 200 mg [elemental]  200 mg Oral TID PRN    sodium chloride (NS) flush 5-40 mL  5-40 mL IntraVENous Q8H    sodium chloride (NS) flush 5-40 mL  5-40 mL IntraVENous PRN    lisinopriL (PRINIVIL, ZESTRIL) tablet 10 mg  10 mg Oral DAILY    And    hydroCHLOROthiazide (HYDRODIURIL) tablet 12.5 mg  12.5 mg Oral DAILY    ALPRAZolam (XANAX) tablet 0.5 mg  0.5 mg Oral Q8H PRN    lactated Ringers infusion  200 mL/hr IntraVENous CONTINUOUS    sodium chloride (NS) flush 5-40 mL  5-40 mL IntraVENous Q8H    sodium chloride (NS) flush 5-40 mL  5-40 mL IntraVENous PRN    polyethylene glycol (MIRALAX) packet 17 g  17 g Oral DAILY PRN    morphine injection 1 mg  1 mg IntraVENous Q4H PRN    acetaminophen (TYLENOL) tablet 1,000 mg  1,000 mg Oral Q6H PRN    oxyCODONE IR (ROXICODONE) tablet 5 mg  5 mg Oral Q4H PRN    oxyCODONE IR (ROXICODONE) tablet 10 mg  10 mg Oral Q4H PRN    ondansetron (ZOFRAN ODT) tablet 4 mg  4 mg Oral Q6H PRN    ondansetron (ZOFRAN) injection 4 mg  4 mg IntraVENous Q4H PRN    ondansetron (ZOFRAN) injection 4 mg  4 mg IntraVENous Q8H     ______________________________________________________________________  EXPECTED LENGTH OF STAY: - - -  ACTUAL LENGTH OF STAY:          Brian Zavaleta MD

## 2022-09-08 VITALS
OXYGEN SATURATION: 98 % | TEMPERATURE: 98.8 F | RESPIRATION RATE: 16 BRPM | HEIGHT: 67 IN | SYSTOLIC BLOOD PRESSURE: 142 MMHG | DIASTOLIC BLOOD PRESSURE: 97 MMHG | HEART RATE: 73 BPM | BODY MASS INDEX: 37.4 KG/M2 | WEIGHT: 238.32 LBS

## 2022-09-08 LAB
ALBUMIN SERPL-MCNC: 3.1 G/DL (ref 3.5–5)
ALBUMIN/GLOB SERPL: 0.9 {RATIO} (ref 1.1–2.2)
ALP SERPL-CCNC: 112 U/L (ref 45–117)
ALT SERPL-CCNC: 321 U/L (ref 12–78)
ANA SER QL: NEGATIVE
ANION GAP SERPL CALC-SCNC: 5 MMOL/L (ref 5–15)
AST SERPL-CCNC: 178 U/L (ref 15–37)
BASOPHILS # BLD: 0 K/UL (ref 0–0.1)
BASOPHILS NFR BLD: 1 % (ref 0–1)
BILIRUB SERPL-MCNC: 1.5 MG/DL (ref 0.2–1)
BUN SERPL-MCNC: 6 MG/DL (ref 6–20)
BUN/CREAT SERPL: 10 (ref 12–20)
CALCIUM SERPL-MCNC: 8.9 MG/DL (ref 8.5–10.1)
CHLORIDE SERPL-SCNC: 105 MMOL/L (ref 97–108)
CO2 SERPL-SCNC: 27 MMOL/L (ref 21–32)
CREAT SERPL-MCNC: 0.61 MG/DL (ref 0.55–1.02)
DIFFERENTIAL METHOD BLD: ABNORMAL
EOSINOPHIL # BLD: 0.2 K/UL (ref 0–0.4)
EOSINOPHIL NFR BLD: 4 % (ref 0–7)
ERYTHROCYTE [DISTWIDTH] IN BLOOD BY AUTOMATED COUNT: 12.3 % (ref 11.5–14.5)
GLOBULIN SER CALC-MCNC: 3.5 G/DL (ref 2–4)
GLUCOSE SERPL-MCNC: 85 MG/DL (ref 65–100)
HCT VFR BLD AUTO: 34.2 % (ref 35–47)
HGB BLD-MCNC: 11.6 G/DL (ref 11.5–16)
IMM GRANULOCYTES # BLD AUTO: 0 K/UL (ref 0–0.04)
IMM GRANULOCYTES NFR BLD AUTO: 0 % (ref 0–0.5)
LYMPHOCYTES # BLD: 1.8 K/UL (ref 0.8–3.5)
LYMPHOCYTES NFR BLD: 39 % (ref 12–49)
MCH RBC QN AUTO: 28 PG (ref 26–34)
MCHC RBC AUTO-ENTMCNC: 33.9 G/DL (ref 30–36.5)
MCV RBC AUTO: 82.6 FL (ref 80–99)
MONOCYTES # BLD: 0.5 K/UL (ref 0–1)
MONOCYTES NFR BLD: 10 % (ref 5–13)
NEUTS SEG # BLD: 2.2 K/UL (ref 1.8–8)
NEUTS SEG NFR BLD: 46 % (ref 32–75)
NRBC # BLD: 0 K/UL (ref 0–0.01)
NRBC BLD-RTO: 0 PER 100 WBC
PLATELET # BLD AUTO: 170 K/UL (ref 150–400)
PMV BLD AUTO: 10.3 FL (ref 8.9–12.9)
POTASSIUM SERPL-SCNC: 3.5 MMOL/L (ref 3.5–5.1)
PROT SERPL-MCNC: 6.6 G/DL (ref 6.4–8.2)
RBC # BLD AUTO: 4.14 M/UL (ref 3.8–5.2)
SODIUM SERPL-SCNC: 137 MMOL/L (ref 136–145)
WBC # BLD AUTO: 4.7 K/UL (ref 3.6–11)

## 2022-09-08 PROCEDURE — 36415 COLL VENOUS BLD VENIPUNCTURE: CPT

## 2022-09-08 PROCEDURE — 74011250637 HC RX REV CODE- 250/637: Performed by: SURGERY

## 2022-09-08 PROCEDURE — 99024 POSTOP FOLLOW-UP VISIT: CPT | Performed by: SURGERY

## 2022-09-08 PROCEDURE — 80053 COMPREHEN METABOLIC PANEL: CPT

## 2022-09-08 PROCEDURE — 74011250636 HC RX REV CODE- 250/636: Performed by: SURGERY

## 2022-09-08 PROCEDURE — 74011250637 HC RX REV CODE- 250/637: Performed by: HOSPITALIST

## 2022-09-08 PROCEDURE — 85025 COMPLETE CBC W/AUTO DIFF WBC: CPT

## 2022-09-08 RX ORDER — OXYCODONE AND ACETAMINOPHEN 5; 325 MG/1; MG/1
1 TABLET ORAL
Qty: 18 TABLET | Refills: 0 | Status: SHIPPED | OUTPATIENT
Start: 2022-09-08 | End: 2022-09-11

## 2022-09-08 RX ORDER — POLYETHYLENE GLYCOL 3350 17 G/17G
17 POWDER, FOR SOLUTION ORAL DAILY
Qty: 14 PACKET | Refills: 1 | Status: SHIPPED | OUTPATIENT
Start: 2022-09-08

## 2022-09-08 RX ADMIN — LISINOPRIL 10 MG: 10 TABLET ORAL at 09:47

## 2022-09-08 RX ADMIN — ACETAMINOPHEN 1000 MG: 500 TABLET, FILM COATED ORAL at 06:16

## 2022-09-08 RX ADMIN — HYDROCHLOROTHIAZIDE 12.5 MG: 25 TABLET ORAL at 09:47

## 2022-09-08 RX ADMIN — ENOXAPARIN SODIUM 30 MG: 100 INJECTION SUBCUTANEOUS at 09:47

## 2022-09-08 RX ADMIN — ONDANSETRON HYDROCHLORIDE 4 MG: 2 SOLUTION INTRAMUSCULAR; INTRAVENOUS at 09:47

## 2022-09-08 RX ADMIN — ACETAMINOPHEN 1000 MG: 500 TABLET, FILM COATED ORAL at 00:27

## 2022-09-08 NOTE — ANESTHESIA POSTPROCEDURE EVALUATION
Post-Anesthesia Evaluation and Assessment    Patient: Dora Lima MRN: [id number]  SSN: [id number]    YOB: 1995  Age: 24 y.o. Sex: female      I have evaluated the patient and they are stable and ready for discharge from the PACU. Cardiovascular Function/Vital Signs  Visit Vitals  BP (!) 142/97 (BP 1 Location: Left upper arm, BP Patient Position: At rest;Sitting)   Pulse 73   Temp 37.1 °C (98.8 °F)   Resp 16   Ht 5' 7\" (1.702 m)   Wt 108.1 kg (238 lb 5.1 oz)   SpO2 98%   BMI 37.33 kg/m²       Patient is status post General anesthesia for Procedure(s):  ENDOSCOPIC RETROGRADE CHOLANGIOPANCREATOGRAPHY (ERCP)  ENDOSCOPIC SPHINCTEROTOMY  ENDOSCOPIC STONE EXTRACTION/BALLOON SWEEP. Nausea/Vomiting: None    Postoperative hydration reviewed and adequate. Pain:  Pain Scale 1: Numeric (0 - 10) (09/08/22 0947)  Pain Intensity 1: 0 (09/08/22 0947)   Managed    Neurological Status:   Neuro (WDL): Exceptions to WDL (09/05/22 1425)  Neuro  Neurologic State: Alert (09/08/22 2927)  Orientation Level: Oriented X4 (09/08/22 0947)  Cognition: Appropriate decision making; Appropriate for age attention/concentration; Appropriate safety awareness (09/07/22 0854)  Speech: Clear (09/07/22 0854)  LUE Motor Response: Purposeful (09/07/22 0854)  LLE Motor Response: Purposeful (09/07/22 0854)  RUE Motor Response: Purposeful (09/07/22 0854)  RLE Motor Response: Purposeful (09/07/22 0854)   At baseline    Mental Status, Level of Consciousness: Alert and  oriented to person, place, and time    Pulmonary Status:   O2 Device: None (Room air) (09/08/22 0947)   Adequate oxygenation and airway patent    Complications related to anesthesia: None    Post-anesthesia assessment completed.  No concerns    Signed By: Tabitha Jang MD     September 8, 2022              Procedure(s):  ENDOSCOPIC RETROGRADE CHOLANGIOPANCREATOGRAPHY (ERCP)  ENDOSCOPIC SPHINCTEROTOMY  ENDOSCOPIC STONE EXTRACTION/BALLOON SWEEP.    general    <BSHSIANPOST>    INITIAL Post-op Vital signs:   Vitals Value Taken Time   /97 09/08/22 0902   Temp 37.1 °C (98.8 °F) 09/08/22 0902   Pulse 73 09/08/22 0902   Resp 16 09/08/22 0902   SpO2 98 % 09/08/22 0902

## 2022-09-08 NOTE — PROGRESS NOTES
To whom it may concern:    Ms. David Zelaya was hospitalized at [facility] in [facility]. They were hospitalized for multiple days. Their prognosis for full recovery is good. I recommend that they remain at home and off work until Thursday, September 22. After this date she can return to work at full duty without restriction. If there are any questions, please feel free to call our office. Sincerely,      Tamara Cabral MD    Bariatric and General Surgeon  [facility]   [address] [place] [address].  [address] [place] [address]: [phone]   F: [phone]

## 2022-09-08 NOTE — PROGRESS NOTES
[address]  [facility], [address]       GI PROGRESS NOTE  Will Ronny Gonzalezr  [phone] office  [phone] NP/PA in-hospital cell phone M-F until 4:30PM  After 5PM or on weekends, please call  for physician on call      NAME: David Zelaya   :  1995   MRN:  [id number]       Subjective:   No nausea, vomiting, or abdominal pain. She is tolerating a regular diet. Objective:     VITALS:   Last 24hrs VS reviewed since prior progress note. Most recent are:  Visit Vitals  BP (!) 142/97 (BP 1 Location: Left upper arm, BP Patient Position: At rest;Sitting)   Pulse 73   Temp 98.8 °F (37.1 °C)   Resp 16   Ht 5' 7\" (1.702 m)   Wt 108.1 kg (238 lb 5.1 oz)   SpO2 98%   BMI 37.33 kg/m²     PHYSICAL EXAM:  General: Cooperative, no acute distress    Neurologic:  Alert and oriented  HEENT: EOMI  Lungs:  No acute respiratory distress  Abdomen: Soft, non-distended, no tenderness, no guarding, no rebound. Extremities: Warm  Psych:   Good insight. Not anxious or agitated. Lab Data Reviewed:     Recent Results (from the past 24 hour(s))   CBC WITH AUTOMATED DIFF    Collection Time: 22  6:18 AM   Result Value Ref Range    WBC 4.7 3.6 - 11.0 K/uL    RBC 4.14 3.80 - 5.20 M/uL    HGB 11.6 11.5 - 16.0 g/dL    HCT 34.2 (L) 35.0 - 47.0 %    MCV 82.6 80.0 - 99.0 FL    MCH 28.0 26.0 - 34.0 PG    MCHC 33.9 30.0 - 36.5 g/dL    RDW 12.3 11.5 - 14.5 %    PLATELET 250 804 - 173 K/uL    MPV 10.3 8.9 - 12.9 FL    NRBC 0.0 0  WBC    ABSOLUTE NRBC 0.00 0.00 - 0.01 K/uL    NEUTROPHILS 46 32 - 75 %    LYMPHOCYTES 39 12 - 49 %    MONOCYTES 10 5 - 13 %    EOSINOPHILS 4 0 - 7 %    BASOPHILS 1 0 - 1 %    IMMATURE GRANULOCYTES 0 0.0 - 0.5 %    ABS. NEUTROPHILS 2.2 1.8 - 8.0 K/UL    ABS. LYMPHOCYTES 1.8 0.8 - 3.5 K/UL    ABS. MONOCYTES 0.5 0.0 - 1.0 K/UL    ABS. EOSINOPHILS 0.2 0.0 - 0.4 K/UL    ABS. BASOPHILS 0.0 0.0 - 0.1 K/UL    ABS. IMM.  GRANS. 0.0 0.00 - 0.04 K/UL    DF AUTOMATED METABOLIC PANEL, COMPREHENSIVE    Collection Time: 09/08/22  6:18 AM   Result Value Ref Range    Sodium 137 136 - 145 mmol/L    Potassium 3.5 3.5 - 5.1 mmol/L    Chloride 105 97 - 108 mmol/L    CO2 27 21 - 32 mmol/L    Anion gap 5 5 - 15 mmol/L    Glucose 85 65 - 100 mg/dL    BUN 6 6 - 20 MG/DL    Creatinine 0.61 0.55 - 1.02 MG/DL    BUN/Creatinine ratio 10 (L) 12 - 20      GFR est AA >60 >60 ml/min/1.73m2    GFR est non-AA >60 >60 ml/min/1.73m2    Calcium 8.9 8.5 - 10.1 MG/DL    Bilirubin, total 1.5 (H) 0.2 - 1.0 MG/DL    ALT (SGPT) 321 (H) 12 - 78 U/L    AST (SGOT) 178 (H) 15 - 37 U/L    Alk. phosphatase 112 45 - 117 U/L    Protein, total 6.6 6.4 - 8.2 g/dL    Albumin 3.1 (L) 3.5 - 5.0 g/dL    Globulin 3.5 2.0 - 4.0 g/dL    A-G Ratio 0.9 (L) 1.1 - 2.2       Assessment:     Choledocholithiasis: status post laparoscopic cholecystectomy 9/5/22 with abnormal intraoperative cholangiogram. Status post ERCP (9/6/22) by Dr. Gabe Valderrama with sphincterotomy and biliary stone removal. LFTs trending down: , , alkaline phosphatase 112, total bilirubin 1.5. Patient Active Problem List   Diagnosis Code    [facility] E66.01    Migraine without status migrainosus, not intractable G43.909    Hirsutism L68.0    11 weeks gestation of pregnancy Z3A.11    Choledocholithiasis K80.50     Plan:     Diet as tolerated  LFTs trending down  Plan for discharge today  We will sign off and be available again as needed. Please call us with any questions. Thank you. Signed By: Art Zaldivar, [address]     9/8/2022  12:30 PM       GI attending note:    Pt discharged. Will setup follow-up office visit with Dr. Lilian Chisholm for abnormal liver serologies.     Dr. Gt Andres

## 2022-09-08 NOTE — ADT AUTH CERT NOTES
General Surgery or Procedure GRG - Care Day 5 (9/7/2022) by Summer Ann       Review Status Review Entered   Completed 9/8/2022 12:20      Criteria Review      Care Day: 5 Care Date: 9/7/2022 Level of Care: Inpatient Floor    Guideline Day 2    Level Of Care    (X) Floor    9/8/2022 12:20:34 EDT by Summer Ann      IP/Medical bed    Clinical Status    (X) * No ICU or intermediate care needs    9/8/2022 12:20:34 EDT by Summer Ann      Pt. admitted at Medical floor    Interventions    (X) Inpatient interventions continue    9/8/2022 12:20:34 EDT by Summer Ann      S/P Lap-melissa and ERCP. on monitoring, IV fluids, IV anti-emetics, abx discontinued today. MD awaiting lab results    * Milestone   Additional Notes    DATE: 09/07      Pertinent Updates:   -Pt passed flatus, on Clear Liquid Diet.    -Elevated T. Bilirubin at 2.5 (H) ; Serum Crea: 0.69 at baseline    -Elevated SBP at 150s ; BP home meds resumed & Lovenox started today      Vitals:    98.6 °F  58   155/100  17  96%  Room Air       Abnl/Pertinent Labs:   WBC: 5.6   RBC: 4.17   HGB: 12.0   HCT: 34.5 (L)   PLATELET: 210   Sodium: 137   Potassium: 4.0   BUN: 6   BUN/Creatinine ratio: 9 (L)   GFR: >60   Calcium: 9.1   Protein, total: 6.4   Albumin: 3.2 (L)   Globulin: 3.2   A-G Ratio: 1.0 (L)   ALT: 328 (H)   AST: 148 (H)   Alk. phosphatase: 130 (H)         Physical Exam:   Neurologic:       Alert and oriented X 3. Lungs:             No increased WOB   Heart:              Regular rate   Abdomen:        Soft, non-distended, no tenderness, no rebound/guarding. Extremities:     warm, no edema   Psych:   Good insight. Not anxious or agitated. MD Consults/Assessments & Plans:   Alia Wild Notes**   Assessment:   Choledocholithiasis status post lap melissa 9/5/22 with abnormal IOC. S/p ERCP. Doing well after procedure.       Plan:   FLD   Labs pending   If she continues to do well, advance diet as tolerated tomorrow      **GS**   Assessment: choledocholithiasis confirmed after lap melissa s/p ERCP       Plan:   PRN pain control   Follow up CMP   Clears. ADAT per GI   Follow up T bili   Stop ABX   Resume lovenox pending labs      Medications:   -Tylenol 1000mg Q6 PRN PO x1    -Tums 200mg TID PRN PO x1   -Lovenox 30mg Q12 SC    -Lisinopril    10mg Daily PO   -Hydrodiuril 12.5mg Daily PO   - mL/hr CONTINUOUS IV    -Zofran 4mg Q8 IV x1   -Ancef   2g Q8 IV x1   -Flagyl 500mg Q12 IV  x1       Orders:   -Neuro/Vascular Checks : Q4H   -Intake & Output : Q8H   -Vital Signs Monitoring    -BMP/LABS now           General Surgery or Procedure GRG - Care Day 4 (9/6/2022) by Brittany Alvarado       Review Status Review Entered   Completed 9/8/2022 12:00      Criteria Review      Care Day: 4 Care Date: 9/6/2022 Level of Care: Inpatient Floor    Guideline Day 1    Level Of Care    ( ) OR to ICU or intermediate care    9/8/2022 12:00:45 EDT by Brittany Alvarado      IP/Medical Bed    Clinical Status    (X) * Clinical Indications met    9/8/2022 12:00:45 EDT by Brittany Alvarado      Impression: Choledocholithiasis - removed with biliary sphincterotomy and balloon extraction    (X) * Procedure completed    9/8/2022 12:00:45 EDT by Brittany Alvarado      S/P ERCP W/SPHINCTEROTOMY/PAPILLOTOMY + REMOVAL CALCULI/DEBRIS BILIARY/PANCREAS DUCT    Interventions    (X) Inpatient interventions as needed    9/8/2022 12:00:45 EDT by Brittany Alvarado      pt.  on continuous monitoring for complications, on IVF + PRN IV pain meds, anti-emetics    (X) Possible prophylactic antiemetic for postoperative nausea and vomiting prophylaxis    9/8/2022 12:00:45 EDT by Brittany Alvarado      Zofran 4mg Q8 IV then 4mg Q4 PRN IV x1    (X) Multimodal analgesia    9/8/2022 12:00:45 EDT by Brittany Alvarado      Tylenol 1000mg Q6 PRN PO x1, Morphine 1mg Q4 PRN IV x3    * Milestone   Additional Notes    DATE: 09/06      Pertinent Updates:   -S/P ERCP, small stone was extracted   -c/o some periumbilical pain, requests water   -Xanax PRN x1, Indomethacin x1 given today    -Tolerated Ice chips, MD advanced to clear liquid diet      Vitals:    98.7 °F  104  134/106   12  98%  Room air      Physical Exam:   Neurologic:       Alert and oriented X 3. HEENT:           EOMI, no scleral icterus    Lungs:             No increased WOB   Heart:              Regular rate & Rhythm   Abdomen:        Soft, non-distended, minimal tenderness,  ATTP, wounds cdi      **Procedure Note**   Procedure Type:   ERCP with biliary sphincterotomy, biliary stone removal    Indications: common bile duct stone, biliary obstruction      : He Hutchinson. Av Sears MD   Sedation:  General anesthesia       Findings:    Esophagus:normal   Stomach: normal    Duodenum: normal, spontaneous biliary drainage was not seen. ERCP: The bile duct was not dilated. There was not an obvious filling defect on the cholangiogram, but there appeared to be a narrowing in the distal common bile duct. Next,a 9-10 mm biliary sphincterotomy was effected. There was no post-sphincterotomy bleeding. Next, a 9 mm to 12 mm biliary extraction balloon was used to sweep the bile duct multiple times. A small stone was extracted. A occlusion cholangiogram was negative. There was adequate drainage of contrast and bile. The pancreatic duct was neither entered or injected during this procedure. I performed all immediate radiologic interpretation during this procedure. Specimen Removed:  none   Complications: None. EBL:  None. Impression: noted on indicia       Recommendations:     1. Watch for complications, including cholangitis, pancreatitis, bleeding, and perforation. 2. IV  cc/h overnight - high risk for post-ERCP pancreatitis (young female patient). Rectal Indomethacin administered. 3. PRN pain medication and anti-emetics   4. NPO for now. Clear liquid diet if patient is pain free.    5. If patient has progressive abdominal pain and/or change in abdominal exam, please check amylase, lipase, CBC, CMP, and upright KUB. MD Consults/Assessments & Plans:   Dianne Sherman notes**   Assessment:   Choledocholithiasis status post lap melissa 9/5/22 with abnormal IOC       Plan:   NPO   On antibiotics     ERCP today, risks discussed and she is agreeable      ** Notes**   Assessment:   choledocholithiasis confirmed after lap melissa       Plan:   PRN pain control   NS bolus ordered for slight SILVANA. Hgb concentrated   NPO.  Clears after ERCP   ERCP hopefully today for CBD stone and biliary obstruction confirmed on IOC   Trend T bili   Cont ancef and flagyl   Resume lovenox after ERCP if safe per GI             Medications:   -Xanax 0.5 mg Q8 PRN PO x1   - mL/hr CONTINUOUS IV x2   -Indomethacin 100mg ONCE RE x1   -NS Bolus 1000mL ONCE IV x1    -Ancef 2g Q8 IV    -Flagyl 500mg Q12 IV       Orders:   -Neuro/Vascular Checks : Q4H   -Intake & Output : Q8H   -Vital Signs Monitoring               Cholecystectomy by Laparoscopy - Care Day 3 (9/5/2022) by [place] Model       Review Status Review Entered   Completed 9/8/2022 11:07      Criteria Review      Care Day: 3 Care Date: 9/5/2022 Level of Care: Inpatient Floor    Guideline Day 1    Level Of Care    ( ) Early AM OR to recovery to discharge    9/8/2022 11:07:22 EDT by Nik Model      IP/Medical bed    Clinical Status    (X) * Successful uncomplicated cholecystectomy    9/8/2022 11:07:22 EDT by [place] [facility]      Procedure: S/P CHOLECYSTECTOMY LAPAROSCOPIC WITH GRAMS    (X) * Nausea absent or managed postoperatively    9/8/2022 11:07:22 EDT by [employer]      pt. was nauseous this morning medicated with IV Zofran    (X) * Pain absent or managed postoperatively    9/8/2022 11:07:22 EDT by [employer]      pt. c/o RUQ abdominal pain medicated with IV + PO PRN pain meds    (X) * No evidence of infection    ( ) * Discharge plans and education understood    Activity    (X) * Ambulatory or acceptable for next level of care    9/8/2022 11:07:22 EDT by Isidra Stiles      Activity as tolerated w/ assist    Routes    (X) IV fluids for procedure    9/8/2022 11:07:22 EDT by Isidra Stiles      LR 200mL/hr IV Continuous    (X) IV medications for procedure    9/8/2022 11:07:22 EDT by Isidra Stiles      Flagyl 500mg Q12 IV    (X) * Oral hydration    (X) * Oral medications or regimen acceptable for next level of care    9/8/2022 11:07:22 EDT by Isidra Stiles      Xanax 0.5mg ONCE PO x1    (X) * Oral diet or acceptable for next level of care    9/8/2022 11:07:22 EDT by Isidra Stiles      Regular Diet, NPO after midnight    Interventions    (X) Possible postoperative CBC    9/8/2022 11:07:22 EDT by Isidra Stiles      WBC: 6.7  RBC: 5.25 (H)  HGB: 15.0  HCT: 43.5  PLATELET: 910  NEUTROPHILS: 77 (H)    Medications    (X) Preoperative antibiotic    9/8/2022 11:07:22 EDT by Nathaly Vera for OR ; Ancef 2g Q8 IV x2    (X) Possible antiemetic regimen    9/8/2022 11:07:22 EDT by Isidra Stiles      Zofran 4mg Q4 PRN IV x2 then 4mg Q8 PRN IV x2    (X) Analgesics    9/8/2022 11:07:22 EDT by Isidra Stiles      Morphine 1mg Q4 PRN IV x2, Roxicodone 10mg Q4 PRN PO x1    * Milestone   Additional Notes    DATE: 09/05      Pertinent Updates:   -Patient states that she is having right sided chest pain RRT was called, non-radiating. lasted about ten minutes, increases with expiration. Hyperventilating. heart rate in 130s, and anxious, work-up was done +  Dose of xanax was given as ordered then at 2130H- states that pain has improved. VSS    -States that she has not passed any flatus since surgery. -on NPO after midnight for ERCP in AM per Gastro      Vitals:   98.7 °F 90 126/85 18 97% Room Air         Abnl/Pertinent Labs/Radiology/Diagnostic Studies:      XR CHOLANG INTRAOPERATIVE:   IMPRESSION   Filling defect distal common duct which is distended.  No spillage of contrast   into the duodenum concerning for impacted stone. Hematology:    WBC: 3.1 (L)   RBC: 4.62   HGB: 13.2   HCT: 38.6   MCV: 83.5   MCH: 28.6   MCHC: 34.2   RDW: 12.1   PLATELET: 152   INR: 1.0   Prothrombin time: 10.7   aPTT: 28.3      Chemistry:   Sodium: 135 (L)  133 (L) 135 (L)   Glucose: 107 (H)   105 (H)   BUN: 5 (L)  7   Creatinine: 1.15 (H)  1.07 (H)   BUN/Creatinine ratio: 6 (L)   Calcium: 9.6   GFR: >60   Bilirubin, total: 4.9 (H)   Protein, total: 8.9 (H)   Albumin: 3.2 (L)  4.1   Globulin: 4.8 (H)  4.1 (H)   A-G Ratio: 0.9 (L)  1.0 (L)   ALT: 476 (H)  498 (H)  501 (H)   AST: 140 (H)  186 (H)  182 (H)   Alk. phosphatase: 113  151 (H)  148 (H)   Troponin-High Sensitivity: <4      XR CHEST PORT:    The a heart size is normal. No acute infiltrate. EKG, 12 LEAD, INITIAL:   Normal sinus rhythm Nonspecific T wave abnormality      Physical Exam:   General:            Alert, appears somewhat uncomfortable related to surgery   HEENT:           icteric sclerae. Abdomen:         Soft, Non distended, tender over incisions. Extremities:      No peripheral edema   Neurologic:        CN 2-12 gi, Alert and oriented X 3. No acute neurological distress       OP Note    Preoperative Diagnosis: CHOLECYSTITIS   Postoperative Diagnosis: CHOLECYSTITIS         Procedure: Procedure(s):   CHOLECYSTECTOMY LAPAROSCOPIC WITH GRAMS       Surgeon: Army Shruti MD   Anesthesia: General    Indications: 23 y/o F with concern for choledocholithiasis   Findings: Dilated CBD with distal small filling defects WITHOUT emptying into duodenum       Description of Operation:    The gallbladder was distended but no inflamed. We aspirated about 90cc bile off the gallbladder with a laparoscopic needle. The dome of the gallbladder was grasped and retracted anteriorly and laterally over the liver edge. The infundibulum was grasped with a bettye and retracted laterally.  Using the hook cautery, the peritoneum of the gallbladder was incised around the infundibulum and lateral boarders. We identified the cystic duct and cystic artery and after further dissection obtained the critical view of safety. We then placed two 5mm clips proximally and one distally on the cystic artery and transected it. Estimated Blood Loss: 5 mL       Specimens:    ID Type                        Source                      Tests                            1 : GALLBLADDER Fresh Gallbladder Pathology       Complications: None   Implants: * No implants in log *         MD Consults/Assessments & Plans:   **IM Notes**   Assessment & Plan:   Cholelithiasis   Hyperbilirubinemia/transaminitis   Abdominal pain   -symptomatic cholelithiasis   -MRI abd cholelithiasis    -Appreciate surgery, Lap melissa today      Hypertension: home meds      Obesity:BMI 34.46 kg/m2: counseling once more stable       NPO       Code status: FULL CODE   Prophylaxis: heparin       **GS Notes**   Assessment: concern for choledocholithiasis       Plan:   As needed pain control, IV fluids, n.p.o., Ancef for OR. High suspicion for obstructive CBD stone. Plan for lap melissa today with IOC. **Gastro Notes**   Assessment:   Abnormal intra-op cholangiogram:    Elevated liver enzymes: Negative work up so far. Although imaging did not show biliary obstruction, IOC was abnormal and likely cause. Cholecystitis: NO clear findings on imaging, however presentation highly suggestive along with elevated liver enzymes. s/p cholecystectomy 09/05   RUQ abdominal pain, nausea: Likely related to obstruction as well as post op pain       Recommendations:   Diet per surgery today.    for ERCP in am    Hold all anticoagulation      Orders:   -Neuro/Vascular Checks : Q4H   -Intake & Output : Q8H   -Vital Signs Monitoring

## 2022-09-08 NOTE — PROGRESS NOTES
[facility] Adult  Hospitalist Group                                                                                          Hospitalist Progress Note  Earline Gracia MD  Answering service: [phone] OR [phone] from in house phone        Date of Service:  2022  NAME:  Hoa Wang  :  1995  MRN:  [id number]      Admission Summary:   Hoa Wang is a 24 y.o. female with past medical history of hypertension, congenital solitary kidney, hidradenitis suppurativa presented as a direct admission/ transfer from [facility] ED ([facility] to [facility]) with chief complaints of back pain, abdominal pain, nausea and dark urine. Symptom worsened yesterday but has been ongoing \"on and off\" for several months with RUQ pain radiating to \"middle of back\", constant, severe, dull, aching, aggravated ~ 1 hour after eating certain foods such as diary, salads, and bread, increased with deep breathing, associated with nausea and dark urine. On arrival at [facility], workup included labs showing , , Alkaline phosphatase 120, total bilirubin 4.6. CT abdomen pelvis with IV no acute abdominal or pelvic pathology with cholelithiasis. Patient is now seen on transfer to [facility] for admission to the hospitalist service. Patient was given Dilaudid IV earlier tonight but she now reports itching. Interval history / Subjective: Follow up abdominal pain   S/p lap melissa   S/p ERCP   No new issues  Assessment & Plan:     Cholelithiasis  Hyperbilirubinemia/transaminitis--improving  Abdominal pain--improving  -symptomatic cholelithiasis  -MRI abd cholelithiasis no evidence of acute cholecystitis.  No intrahepatic ductal dilatation  -s/p lap melissa   -s/p ERCP   -Tolerating full liquids  -Appreciate surgery  -Appreciate GI,  -advanced diet, if tolerates, will discharge the patient home    Hypertension: home meds  Obesity:BMI 34.46 kg/m2: counseling once more stable     GI bland diet     Code status: FULL CODE  Prophylaxis: lovenox    Plan: discharge home  Care Plan discussed with: Patient  Anticipated Disposition: ?tomorrow     Hospital Problems  Date Reviewed: 9/4/2022            Codes Class Noted POA    Choledocholithiasis ICD-10-CM: K80.50  ICD-9-CM: 574.50  9/3/2022 Unknown       Review of Systems:   A comprehensive review of systems was negative except for that written in the HPI. Vital Signs:    Last 24hrs VS reviewed since prior progress note. Most recent are:  Visit Vitals  BP (!) 142/97 (BP 1 Location: Left upper arm, BP Patient Position: At rest;Sitting)   Pulse 73   Temp 98.8 °F (37.1 °C)   Resp 16   Ht 5' 7\" (1.702 m)   Wt 108.1 kg (238 lb 5.1 oz)   SpO2 98%   BMI 37.33 kg/m²       No intake or output data in the 24 hours ending 09/08/22 1143       Physical Examination:     I had a face to face encounter with this patient and independently examined them on 9/8/2022 as outlined below:          Constitutional:  No acute distress   ENT:  Oral mucosa moist, oropharynx benign. Resp:  CTA bilaterally. No wheezing/rhonchi/rales. No accessory muscle use. CV:  Regular rhythm, normal rate, no murmurs, gallops, rubs    GI:  Soft, non distended, non tender. normoactive bowel sounds, no hepatosplenomegaly     Musculoskeletal:  No edema, warm, 2+ pulses throughout    Neurologic:  Moves all extremities.   AAOx3, CN II-XII reviewed            Data Review:    Review and/or order of clinical lab test      Labs:     Recent Labs     09/08/22 0618 09/07/22  0711   WBC 4.7 5.6   HGB 11.6 12.0   HCT 34.2* 34.5*    188       Recent Labs     09/08/22 0618 09/07/22  0711 09/05/22 2148    137 135*   K 3.5 4.0 4.0    105 102   CO2 27 25 21   BUN 6 6 6   CREA 0.61 0.69 1.07*   GLU 85 86 105*   CA 8.9 9.1 9.6       Recent Labs     09/08/22 0618 09/07/22  0711 09/05/22  2148   * 328* 501*    130* 148*   TBILI 1.5* 2.5* 4.9*   TP 6.6 6.4 8.1   ALB 3.1* 3.2* 4.0 GLOB 3.5 3.2 4.1*       No results for input(s): INR, PTP, APTT, INREXT, INREXT in the last 72 hours. No results for input(s): FE, TIBC, PSAT, FERR in the last 72 hours. No results found for: FOL, RBCF   No results for input(s): PH, PCO2, PO2 in the last 72 hours. No results for input(s): CPK, CKNDX, TROIQ in the last 72 hours.     No lab exists for component: CPKMB  Lab Results   Component Value Date/Time    Cholesterol, total 248 (H) 08/30/2017 01:39 PM    HDL Cholesterol 48 08/30/2017 01:39 PM    LDL, calculated 168 (H) 08/30/2017 01:39 PM    Triglyceride 161 (H) 08/30/2017 01:39 PM     No results found for: GLUCPOC  Lab Results   Component Value Date/Time    Color DARK YELLOW 09/03/2022 02:06 PM    Appearance HAZY (A) 09/03/2022 02:06 PM    Specific gravity 1.020 09/03/2022 02:06 PM    Specific gravity 1.024 02/07/2022 01:23 PM    pH (UA) 6.0 09/03/2022 02:06 PM    Protein Negative 09/03/2022 02:06 PM    Glucose Negative 09/03/2022 02:06 PM    Ketone Negative 09/03/2022 02:06 PM    Bilirubin Negative 02/07/2022 01:23 PM    Urobilinogen 1.0 09/03/2022 02:06 PM    Nitrites Negative 09/03/2022 02:06 PM    Leukocyte Esterase SMALL (A) 09/03/2022 02:06 PM    Epithelial cells MANY (A) 09/03/2022 02:06 PM    Bacteria Negative 09/03/2022 02:06 PM    WBC 5-10 09/03/2022 02:06 PM    RBC 0-5 09/03/2022 02:06 PM         Medications Reviewed:     Current Facility-Administered Medications   Medication Dose Route Frequency    enoxaparin (LOVENOX) injection 30 mg  30 mg SubCUTAneous Q12H    calcium carbonate (TUMS) chewable tablet 200 mg [elemental]  200 mg Oral TID PRN    sodium chloride (NS) flush 5-40 mL  5-40 mL IntraVENous Q8H    sodium chloride (NS) flush 5-40 mL  5-40 mL IntraVENous PRN    lisinopriL (PRINIVIL, ZESTRIL) tablet 10 mg  10 mg Oral DAILY    And    hydroCHLOROthiazide (HYDRODIURIL) tablet 12.5 mg  12.5 mg Oral DAILY    ALPRAZolam (XANAX) tablet 0.5 mg  0.5 mg Oral Q8H PRN    lactated Ringers infusion 200 mL/hr IntraVENous CONTINUOUS    sodium chloride (NS) flush 5-40 mL  5-40 mL IntraVENous Q8H    sodium chloride (NS) flush 5-40 mL  5-40 mL IntraVENous PRN    polyethylene glycol (MIRALAX) packet 17 g  17 g Oral DAILY PRN    morphine injection 1 mg  1 mg IntraVENous Q4H PRN    acetaminophen (TYLENOL) tablet 1,000 mg  1,000 mg Oral Q6H PRN    oxyCODONE IR (ROXICODONE) tablet 5 mg  5 mg Oral Q4H PRN    oxyCODONE IR (ROXICODONE) tablet 10 mg  10 mg Oral Q4H PRN    ondansetron (ZOFRAN ODT) tablet 4 mg  4 mg Oral Q6H PRN    ondansetron (ZOFRAN) injection 4 mg  4 mg IntraVENous Q4H PRN    ondansetron (ZOFRAN) injection 4 mg  4 mg IntraVENous Q8H     ______________________________________________________________________  EXPECTED LENGTH OF STAY: 3d 12h  ACTUAL LENGTH OF STAY:          Tabatha Andrew MD

## 2022-09-08 NOTE — PROGRESS NOTES
Surgery Progress Note    9/8/2022    Admit Date: 9/3/2022    CC: Nausea    9/5-Lap melissa with IOC  9/6 ERCP      Subjective: Tolerating diet, pain controlled    Constitutional: No fever or chills  Neurologic: No headache  Eyes: No scleral icterus or irritated eyes  Nose: No nasal pain or drainage  Mouth: No oral lesions or sore throat  Cardiac: No palpations or chest pain  Pulmonary: No cough or shortness of breath  Gastrointestinal: Abd pain, no emesis, diarrhea, or constipation  Genitourinary: No dysuria  Musculoskeletal: No muscle or joint tenderness  Skin: No rashes or lesions  Psychiatric: No anxiety or depressed mood    Objective:   Visit Vitals  BP (!) 142/97 (BP 1 Location: Left upper arm, BP Patient Position: At rest;Sitting)   Pulse 73   Temp 98.8 °F (37.1 °C)   Resp 16   Ht 5' 7\" (1.702 m)   Wt 238 lb 5.1 oz (108.1 kg)   SpO2 98%   BMI 37.33 kg/m²       General: No acute distress, conversant  Eyes: PERRLA, no scleral icterus  HENT: Normocephalic without oral lesions  Neck: Trachea midline without LAD  Cardiac: Normal pulse rate and rhythm  Pulmonary: Symmetric chest rise with normal effort  GI: Soft, ATTP, wounds cdi  Skin: Warm without rash  Extremities: No edema or joint stiffness  Psych: Appropriate mood and affect    Labs, vital signs, and I/O reviewed. Assessment:     81-year-old female with choledocholithiasis confirmed after lap melissa s/p ERCP    Plan:     T bili trending down nicely. Appears stable. Tolerating diet. Okay for discharge home. Follow-up with me in 2 weeks. Prescription sent in.     Work note on chart        Arnold Pritchett MD  Bariatric and General Surgeon  [facility]

## 2022-09-08 NOTE — DISCHARGE SUMMARY
Discharge Summary       PATIENT ID: Bryant Peña  MRN: [id number]   YOB: 1995    DATE OF ADMISSION: 9/3/2022 12:22 PM    DATE OF DISCHARGE: 9/8/2022   PRIMARY CARE PROVIDER: Luther Martin MD     ATTENDING PHYSICIAN: Dr Edenilson Velasco  DISCHARGING PROVIDER: Edenilson Velasco MD    To contact this individual call [phone] and ask the  to page. If unavailable ask to be transferred the Adult Hospitalist Department. CONSULTATIONS: IP CONSULT TO GASTROENTEROLOGY  IP CONSULT TO GENERAL SURGERY    PROCEDURES/SURGERIES: Procedure(s):  ENDOSCOPIC RETROGRADE CHOLANGIOPANCREATOGRAPHY (ERCP)  ENDOSCOPIC SPHINCTEROTOMY  ENDOSCOPIC STONE EXTRACTION/BALLOON SWEEP    DISCHARGE DIAGNOSES:   Cholelithiasis  Hyperbilirubinemia/transaminitis--improving  Abdominal pain--improving  -symptomatic cholelithiasis  -MRI abd cholelithiasis no evidence of acute cholecystitis. No intrahepatic ductal dilatation  -s/p lap melissa 9/5  -s/p ERCP 9/6  -Tolerating full liquids  -Appreciate surgery  -Appreciate GI,  -advanced diet, if tolerates, will discharge the patient home    Hypertension: home meds  Obesity:BMI 34.46 kg/m2: counseling once more stable      ADDITIONAL CARE RECOMMENDATIONS:   Follow up with PMD  Follow up with Surgery     NOTIFY YOUR PHYSICIAN FOR ANY OF THE FOLLOWING:   Fever over 101 degrees for 24 hours. Chest pain, shortness of breath, fever, chills, nausea, vomiting, diarrhea, change in mentation, falling, weakness, bleeding. Severe pain or pain not relieved by medications, as well as any other signs or symptoms that you may have questions about.     FOLLOW UP APPOINTMENTS:    Follow-up Information       Follow up With Specialties Details Why Contact Info    Luther Martin MD Internal Medicine Physician Follow up in 1 week(s)  [address]  [phone]      Sandi Sanders MD General Surgery, Bariatrics Follow up in 1 week(s)  [address]  [facility] [address] [facility]  [phone]                 DIET: Regular Diet    ACTIVITY: Activity as tolerated    DISCHARGE MEDICATIONS:  Current Discharge Medication List        CONTINUE these medications which have NOT CHANGED    Details   lisinopril-hydroCHLOROthiazide (PRINZIDE, ZESTORETIC) 10-12.5 mg per tablet Take 1 Tab by mouth daily. Qty: 30 Tab, Refills: 3             DISPOSITION:   x Home With:   OT  PT  HH  RN       Long term SNF/Inpatient Rehab    Independent/assisted living    Hospice    Other:       PATIENT CONDITION AT DISCHARGE:     Functional status    Poor     Deconditioned    x Independent      Cognition   x  Lucid     Forgetful     Dementia      Catheters/lines (plus indication)    Tello     PICC     PEG    x None      Code status    x Full code     DNR      PHYSICAL EXAMINATION AT DISCHARGE:  Please see progress note      CHRONIC MEDICAL DIAGNOSES:  Problem List as of 2022 Date Reviewed: 2022            Codes Class Noted - Resolved    Choledocholithiasis ICD-10-CM: K80.50  ICD-9-CM: 574.50  9/3/2022 - Present        11 weeks gestation of pregnancy ICD-10-CM: Z3A.11  ICD-9-CM: V22.2  2020 - Present    Overview Addendum 2020  2:00 PM by Parker MOYA     Primary Provider: Donavon  G2 P 1  EDC by LMP and Early sono  Social: First baby @ [address] [facility] in [place], moved from [facility] High 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

## 2022-09-09 LAB
HSV1 DNA SPEC QL NAA+PROBE: NEGATIVE
HSV2 DNA SPEC QL NAA+PROBE: NEGATIVE
SPECIMEN SOURCE: NORMAL

## 2022-09-10 LAB
EBV DNA SPEC QL NAA+PROBE: NEGATIVE
SPECIMEN SOURCE: NORMAL

## 2022-09-16 ENCOUNTER — OFFICE VISIT (OUTPATIENT)
Dept: SURGERY | Age: 27
End: 2022-09-16
Payer: COMMERCIAL

## 2022-09-16 VITALS
OXYGEN SATURATION: 99 % | SYSTOLIC BLOOD PRESSURE: 123 MMHG | WEIGHT: 211.5 LBS | BODY MASS INDEX: 33.19 KG/M2 | HEIGHT: 67 IN | HEART RATE: 85 BPM | DIASTOLIC BLOOD PRESSURE: 86 MMHG | RESPIRATION RATE: 20 BRPM | TEMPERATURE: 98 F

## 2022-09-16 DIAGNOSIS — Z09 POSTOPERATIVE EXAMINATION: Primary | ICD-10-CM

## 2022-09-16 PROCEDURE — 99024 POSTOP FOLLOW-UP VISIT: CPT | Performed by: NURSE PRACTITIONER

## 2022-09-16 NOTE — PROGRESS NOTES
1. Have you been to the ER, urgent care clinic since your last visit? Hospitalized since your last visit? No    2. Have you seen or consulted any other health care providers outside of the [address] [place] [address] since your last visit? Include any pap smears or colon screening.  No

## 2022-09-16 NOTE — PROGRESS NOTES
Subjective:      Brad Bush is a 24 y.o. female presents for postop care following cholecystectomy 11 days ago. Appetite is good. Eating a regular diet without difficulty. Bowel movements are regular. The patient is not having any pain. .          Ms. Brynn Moritz has a reminder for a \"due or due soon\" health maintenance. I have asked that she contact her primary care provider for follow-up on this health maintenance. Objective:     Visit Vitals  /86   Pulse 85   Temp 98 °F (36.7 °C)   Resp 20   Ht 5' 7\" (1.702 m)   Wt 211 lb 8 oz (95.9 kg)   SpO2 99%   BMI 33.13 kg/m²       General:  alert, cooperative, no distress   Abdomen: soft, non-tender   Incision:   healing well, no drainage, no dehiscence, incision well approximated     Assessment:     Doing well postoperatively. Plan:     Follow up as needed  May increase activity as tolerated. No lifting greater than 20 lbs x1 week then may increase each week thereafter. Path reviewed with patient.      Veverly Aw, NP

## 2022-09-16 NOTE — LETTER
NOTIFICATION RETURN TO WORK     9/16/2022 10:13 AM    Ms. Don Richard [facility] [phone]      To Whom It May Concern:    Aaron John is currently under the care of Kay Krishna. She will return to work on 9/19/2022. If there are questions or concerns please have the patient contact our office.         Sincerely,      Tang Bueno NP

## 2022-09-29 NOTE — LETTER
NOTIFICATION OF RETURN TO WORK / SCHOOL 
 
1/13/2020 Ms. Cal Blackburn [facility] [address] To Whom It May Concern: 
 
Cal Blackburn was under the care of [address] [place] [address] and Primary Care. She was evaluated today on 1/13/20 and will be cleared to return to work on 1/15/20. If there are questions or concerns please have the patient contact our office. Sincerely, Oj Deelon MD 
 numerical 0-10

## 2023-05-22 RX ORDER — POLYETHYLENE GLYCOL 3350 17 G/17G
17 POWDER, FOR SOLUTION ORAL DAILY
COMMUNITY
Start: 2022-09-08

## 2023-05-22 RX ORDER — LISINOPRIL AND HYDROCHLOROTHIAZIDE 12.5; 1 MG/1; MG/1
1 TABLET ORAL DAILY
COMMUNITY
Start: 2020-11-23

## (undated) DEVICE — C-ARM: Brand: UNBRANDED

## (undated) DEVICE — GARMENT,MEDLINE,DVT,INT,CALF,LG, GEN2: Brand: MEDLINE

## (undated) DEVICE — DRAPE,REIN 53X77,STERILE: Brand: MEDLINE

## (undated) DEVICE — GLOVE SURG SZ 7 L12IN FNGR THK79MIL GRN LTX FREE

## (undated) DEVICE — PMI OPERATIVE CHOLANGIOGRAM CATHETER; TUBING IS 76CM IN LENGTH, 16GA WITH A: Brand: PMI

## (undated) DEVICE — Device

## (undated) DEVICE — COVER LT HNDL PLAS RIG 1 PER PK

## (undated) DEVICE — GLOVE SURG SZ 65 L12IN FNGR THK94MIL STD WHT LTX FREE

## (undated) DEVICE — GARMENT,MEDLINE,DVT,INT,CALF,MED, GEN2: Brand: MEDLINE

## (undated) DEVICE — APPLIER CLP M/L SHFT DIA5MM 15 LIG LIGAMAX 5

## (undated) DEVICE — GOWN,ECLIPSE,FABRNF,XL,30/CS: Brand: MEDLINE

## (undated) DEVICE — REM POLYHESIVE ADULT PATIENT RETURN ELECTRODE: Brand: VALLEYLAB

## (undated) DEVICE — SUTURE MCRYL SZ 4-0 L27IN ABSRB UD L19MM PS-2 1/2 CIR PRIM Y426H

## (undated) DEVICE — SCISSORS ENDOSCP DIA5MM CRV MPLR CAUT W/ RATCH HNDL

## (undated) DEVICE — TROCAR ENDOSCP L100MM DIA12MM BLDELSS OBT RADLUC STBL SL

## (undated) DEVICE — SYSTEM EVAC SMOKE LAPARSCOPIC

## (undated) DEVICE — GENERAL LAPAROSCOPY - SMH: Brand: MEDLINE INDUSTRIES, INC.

## (undated) DEVICE — SUTURE PDS II SZ 0 L27IN ABSRB VLT L26MM CT-2 1/2 CIR Z334H

## (undated) DEVICE — CANNULATING SPHINCTEROTOME: Brand: JAGTOME™ REVOLUTION RX

## (undated) DEVICE — ELECTRODE ES 36CM LAP FLAT L HK COAT DISP CLEANCOAT

## (undated) DEVICE — TROCAR ENDOSCP L100MM DIA5MM BLDELSS STBL SL THRD OPT VW

## (undated) DEVICE — NEEDLE SPNL 22GA L3.5IN BLK HUB S STL REG WALL FIT STYL W/

## (undated) DEVICE — POWER SHELL: Brand: SIGNIA

## (undated) DEVICE — ARTICULATION RELOAD WITH TRI-STAPLE TECHNOLOGY: Brand: ENDO GIA

## (undated) DEVICE — SYR 20ML LL STRL LF --

## (undated) DEVICE — SYR LR LCK 1ML GRAD NSAF 30ML --

## (undated) DEVICE — TROCAR ENDOSCP L100MM DIA5MM BLDELSS STBL SL OBT RADLUC

## (undated) DEVICE — RETRIEVAL BALLOON CATHETER: Brand: EXTRACTOR™ PRO RX

## (undated) DEVICE — BAG SPEC REM 224ML W4XL6IN DIA10MM 1 HND GYN DISP ENDOPCH

## (undated) DEVICE — DEVICE LCK BILI RAP EXCHG OLPS --

## (undated) DEVICE — DERMABOND SKIN ADH 0.7ML -- DERMABOND ADVANCED 12/BX

## (undated) DEVICE — 4-PORT MANIFOLD: Brand: NEPTUNE 2